# Patient Record
Sex: FEMALE | Race: WHITE | NOT HISPANIC OR LATINO | Employment: OTHER | URBAN - METROPOLITAN AREA
[De-identification: names, ages, dates, MRNs, and addresses within clinical notes are randomized per-mention and may not be internally consistent; named-entity substitution may affect disease eponyms.]

---

## 2018-04-05 ENCOUNTER — RECORDS - HEALTHEAST (OUTPATIENT)
Dept: LAB | Facility: CLINIC | Age: 83
End: 2018-04-05

## 2018-04-05 LAB
ALBUMIN UR-MCNC: NEGATIVE MG/DL
APPEARANCE UR: CLEAR
BACTERIA #/AREA URNS HPF: ABNORMAL HPF
BILIRUB UR QL STRIP: NEGATIVE
COLOR UR AUTO: YELLOW
GLUCOSE UR STRIP-MCNC: NEGATIVE MG/DL
HGB UR QL STRIP: NEGATIVE
HYALINE CASTS #/AREA URNS LPF: ABNORMAL LPF
INR PPP: 2.05 (ref 0.9–1.1)
KETONES UR STRIP-MCNC: NEGATIVE MG/DL
LEUKOCYTE ESTERASE UR QL STRIP: ABNORMAL
MUCOUS THREADS #/AREA URNS LPF: ABNORMAL LPF
NITRATE UR QL: NEGATIVE
PH UR STRIP: 5.5 [PH] (ref 4.5–8)
RBC #/AREA URNS AUTO: ABNORMAL HPF
SP GR UR STRIP: 1.01 (ref 1–1.03)
SQUAMOUS #/AREA URNS AUTO: ABNORMAL LPF
UROBILINOGEN UR STRIP-ACNC: ABNORMAL
WBC #/AREA URNS AUTO: ABNORMAL HPF
WBC CLUMPS #/AREA URNS HPF: PRESENT /[HPF]

## 2018-04-06 ENCOUNTER — RECORDS - HEALTHEAST (OUTPATIENT)
Dept: LAB | Facility: CLINIC | Age: 83
End: 2018-04-06

## 2018-04-07 LAB — BACTERIA SPEC CULT: ABNORMAL

## 2018-04-09 LAB
ANION GAP SERPL CALCULATED.3IONS-SCNC: 8 MMOL/L (ref 5–18)
BUN SERPL-MCNC: 25 MG/DL (ref 8–28)
CALCIUM SERPL-MCNC: 8.7 MG/DL (ref 8.5–10.5)
CHLORIDE BLD-SCNC: 98 MMOL/L (ref 98–107)
CO2 SERPL-SCNC: 28 MMOL/L (ref 22–31)
CREAT SERPL-MCNC: 0.82 MG/DL (ref 0.6–1.1)
ERYTHROCYTE [DISTWIDTH] IN BLOOD BY AUTOMATED COUNT: 14.2 % (ref 11–14.5)
GFR SERPL CREATININE-BSD FRML MDRD: >60 ML/MIN/1.73M2
GLUCOSE BLD-MCNC: 99 MG/DL (ref 70–125)
HCT VFR BLD AUTO: 26.8 % (ref 35–47)
HGB BLD-MCNC: 8.8 G/DL (ref 12–16)
INR PPP: 2.42 (ref 0.9–1.1)
MCH RBC QN AUTO: 29 PG (ref 27–34)
MCHC RBC AUTO-ENTMCNC: 32.8 G/DL (ref 32–36)
MCV RBC AUTO: 88 FL (ref 80–100)
PLATELET # BLD AUTO: 203 THOU/UL (ref 140–440)
PMV BLD AUTO: 9.3 FL (ref 8.5–12.5)
POTASSIUM BLD-SCNC: 3.9 MMOL/L (ref 3.5–5)
RBC # BLD AUTO: 3.03 MILL/UL (ref 3.8–5.4)
SODIUM SERPL-SCNC: 134 MMOL/L (ref 136–145)
WBC: 5.4 THOU/UL (ref 4–11)

## 2018-04-12 ENCOUNTER — RECORDS - HEALTHEAST (OUTPATIENT)
Dept: LAB | Facility: CLINIC | Age: 83
End: 2018-04-12

## 2018-04-13 ENCOUNTER — RECORDS - HEALTHEAST (OUTPATIENT)
Dept: LAB | Facility: CLINIC | Age: 83
End: 2018-04-13

## 2018-04-13 LAB — INR PPP: 3.85 (ref 0.9–1.1)

## 2018-04-16 LAB
HGB BLD-MCNC: 8.9 G/DL (ref 12–16)
INR PPP: 2.21 (ref 0.9–1.1)

## 2018-04-17 ENCOUNTER — RECORDS - HEALTHEAST (OUTPATIENT)
Dept: LAB | Facility: CLINIC | Age: 83
End: 2018-04-17

## 2018-04-17 LAB — INR PPP: 2.09 (ref 0.9–1.1)

## 2018-04-18 ENCOUNTER — RECORDS - HEALTHEAST (OUTPATIENT)
Dept: LAB | Facility: CLINIC | Age: 83
End: 2018-04-18

## 2018-04-19 LAB — INR PPP: 1.5 (ref 0.9–1.1)

## 2018-04-20 ENCOUNTER — RECORDS - HEALTHEAST (OUTPATIENT)
Dept: LAB | Facility: CLINIC | Age: 83
End: 2018-04-20

## 2018-04-23 LAB
HGB BLD-MCNC: 9.6 G/DL (ref 12–16)
INR PPP: 1.38 (ref 0.9–1.1)

## 2018-04-25 ENCOUNTER — RECORDS - HEALTHEAST (OUTPATIENT)
Dept: LAB | Facility: CLINIC | Age: 83
End: 2018-04-25

## 2018-04-26 LAB — INR PPP: 1.64 (ref 0.9–1.1)

## 2018-04-30 ENCOUNTER — RECORDS - HEALTHEAST (OUTPATIENT)
Dept: LAB | Facility: CLINIC | Age: 83
End: 2018-04-30

## 2018-04-30 LAB — INR PPP: 1.71 (ref 0.9–1.1)

## 2018-05-02 ENCOUNTER — RECORDS - HEALTHEAST (OUTPATIENT)
Dept: LAB | Facility: CLINIC | Age: 83
End: 2018-05-02

## 2018-05-02 LAB — INR PPP: 1.85 (ref 0.9–1.1)

## 2018-05-03 ENCOUNTER — RECORDS - HEALTHEAST (OUTPATIENT)
Dept: LAB | Facility: CLINIC | Age: 83
End: 2018-05-03

## 2018-05-04 LAB — INR PPP: 2.12 (ref 0.9–1.1)

## 2018-05-07 ENCOUNTER — RECORDS - HEALTHEAST (OUTPATIENT)
Dept: LAB | Facility: CLINIC | Age: 83
End: 2018-05-07

## 2018-05-07 LAB — INR PPP: 3.55 (ref 0.9–1.1)

## 2018-05-08 ENCOUNTER — RECORDS - HEALTHEAST (OUTPATIENT)
Dept: LAB | Facility: CLINIC | Age: 83
End: 2018-05-08

## 2018-05-08 LAB — INR PPP: 3.08 (ref 0.9–1.1)

## 2018-05-09 ENCOUNTER — RECORDS - HEALTHEAST (OUTPATIENT)
Dept: LAB | Facility: CLINIC | Age: 83
End: 2018-05-09

## 2018-05-09 LAB — INR PPP: 2.26 (ref 0.9–1.1)

## 2020-06-25 ENCOUNTER — HOME CARE/HOSPICE - HEALTHEAST (OUTPATIENT)
Dept: HOME HEALTH SERVICES | Facility: HOME HEALTH | Age: 85
End: 2020-06-25

## 2020-07-17 ENCOUNTER — DOCUMENTATION ONLY (OUTPATIENT)
Dept: OTHER | Facility: CLINIC | Age: 85
End: 2020-07-17

## 2020-07-17 ENCOUNTER — AMBULATORY - HEALTHEAST (OUTPATIENT)
Dept: OTHER | Facility: CLINIC | Age: 85
End: 2020-07-17

## 2021-04-17 ENCOUNTER — COMMUNICATION - HEALTHEAST (OUTPATIENT)
Dept: SCHEDULING | Facility: CLINIC | Age: 86
End: 2021-04-17

## 2021-05-26 ENCOUNTER — RECORDS - HEALTHEAST (OUTPATIENT)
Dept: ADMINISTRATIVE | Facility: CLINIC | Age: 86
End: 2021-05-26

## 2022-04-29 ENCOUNTER — HOSPITAL ENCOUNTER (OUTPATIENT)
Facility: CLINIC | Age: 87
Setting detail: OBSERVATION
Discharge: HOME | End: 2022-04-30
Attending: EMERGENCY MEDICINE | Admitting: FAMILY MEDICINE
Payer: MEDICARE

## 2022-04-29 DIAGNOSIS — T50.905S ADVERSE EFFECT OF DRUG, SEQUELA: ICD-10-CM

## 2022-04-29 DIAGNOSIS — N30.00 ACUTE CYSTITIS WITHOUT HEMATURIA: ICD-10-CM

## 2022-04-29 DIAGNOSIS — R33.9 URINARY RETENTION: Primary | ICD-10-CM

## 2022-04-29 LAB
ALBUMIN SERPL-MCNC: 4.3 G/DL (ref 3.5–5)
ALBUMIN UR-MCNC: NEGATIVE MG/DL
ALP SERPL-CCNC: 77 U/L (ref 45–120)
ALT SERPL W P-5'-P-CCNC: 17 U/L (ref 0–45)
ANION GAP SERPL CALCULATED.3IONS-SCNC: 15 MMOL/L (ref 5–18)
APPEARANCE UR: CLEAR
AST SERPL W P-5'-P-CCNC: 21 U/L (ref 0–40)
BACTERIA #/AREA URNS HPF: ABNORMAL /HPF
BASOPHILS # BLD AUTO: 0 10E3/UL (ref 0–0.2)
BASOPHILS NFR BLD AUTO: 1 %
BILIRUB SERPL-MCNC: 0.5 MG/DL (ref 0–1)
BILIRUB UR QL STRIP: NEGATIVE
BUN SERPL-MCNC: 41 MG/DL (ref 8–28)
CALCIUM SERPL-MCNC: 9.4 MG/DL (ref 8.5–10.5)
CHLORIDE BLD-SCNC: 100 MMOL/L (ref 98–107)
CO2 SERPL-SCNC: 23 MMOL/L (ref 22–31)
COLOR UR AUTO: ABNORMAL
CREAT SERPL-MCNC: 1.38 MG/DL (ref 0.6–1.1)
EOSINOPHIL # BLD AUTO: 0.3 10E3/UL (ref 0–0.7)
EOSINOPHIL NFR BLD AUTO: 5 %
ERYTHROCYTE [DISTWIDTH] IN BLOOD BY AUTOMATED COUNT: 12.8 % (ref 10–15)
GFR SERPL CREATININE-BSD FRML MDRD: 36 ML/MIN/1.73M2
GLUCOSE BLD-MCNC: 104 MG/DL (ref 70–125)
GLUCOSE UR STRIP-MCNC: NEGATIVE MG/DL
HCT VFR BLD AUTO: 39.2 % (ref 35–47)
HGB BLD-MCNC: 12.8 G/DL (ref 11.7–15.7)
HGB UR QL STRIP: NEGATIVE
HOLD SPECIMEN: NORMAL
IMM GRANULOCYTES # BLD: 0 10E3/UL
IMM GRANULOCYTES NFR BLD: 1 %
INR PPP: 2.59 (ref 0.85–1.15)
KETONES UR STRIP-MCNC: NEGATIVE MG/DL
LACTATE SERPL-SCNC: 0.8 MMOL/L (ref 0.7–2)
LEUKOCYTE ESTERASE UR QL STRIP: ABNORMAL
LYMPHOCYTES # BLD AUTO: 1.9 10E3/UL (ref 0.8–5.3)
LYMPHOCYTES NFR BLD AUTO: 29 %
MCH RBC QN AUTO: 28.9 PG (ref 26.5–33)
MCHC RBC AUTO-ENTMCNC: 32.7 G/DL (ref 31.5–36.5)
MCV RBC AUTO: 89 FL (ref 78–100)
MONOCYTES # BLD AUTO: 0.8 10E3/UL (ref 0–1.3)
MONOCYTES NFR BLD AUTO: 13 %
NEUTROPHILS # BLD AUTO: 3.5 10E3/UL (ref 1.6–8.3)
NEUTROPHILS NFR BLD AUTO: 51 %
NITRATE UR QL: NEGATIVE
NRBC # BLD AUTO: 0 10E3/UL
NRBC BLD AUTO-RTO: 0 /100
PH UR STRIP: 6.5 [PH] (ref 5–7)
PLATELET # BLD AUTO: 209 10E3/UL (ref 150–450)
POTASSIUM BLD-SCNC: 4.3 MMOL/L (ref 3.5–5)
PROT SERPL-MCNC: 7.7 G/DL (ref 6–8)
RBC # BLD AUTO: 4.43 10E6/UL (ref 3.8–5.2)
RBC URINE: 1 /HPF
SARS-COV-2 RNA RESP QL NAA+PROBE: NEGATIVE
SODIUM SERPL-SCNC: 138 MMOL/L (ref 136–145)
SP GR UR STRIP: 1.01 (ref 1–1.03)
SQUAMOUS EPITHELIAL: <1 /HPF
UROBILINOGEN UR STRIP-MCNC: <2 MG/DL
WBC # BLD AUTO: 6.6 10E3/UL (ref 4–11)
WBC URINE: 10 /HPF

## 2022-04-29 PROCEDURE — 85610 PROTHROMBIN TIME: CPT | Performed by: INTERNAL MEDICINE

## 2022-04-29 PROCEDURE — 99220 PR INITIAL OBSERVATION CARE,LEVEL III: CPT | Performed by: INTERNAL MEDICINE

## 2022-04-29 PROCEDURE — G0378 HOSPITAL OBSERVATION PER HR: HCPCS

## 2022-04-29 PROCEDURE — 36415 COLL VENOUS BLD VENIPUNCTURE: CPT | Performed by: EMERGENCY MEDICINE

## 2022-04-29 PROCEDURE — 96376 TX/PRO/DX INJ SAME DRUG ADON: CPT

## 2022-04-29 PROCEDURE — 87635 SARS-COV-2 COVID-19 AMP PRB: CPT | Performed by: EMERGENCY MEDICINE

## 2022-04-29 PROCEDURE — 250N000013 HC RX MED GY IP 250 OP 250 PS 637: Performed by: INTERNAL MEDICINE

## 2022-04-29 PROCEDURE — C9803 HOPD COVID-19 SPEC COLLECT: HCPCS

## 2022-04-29 PROCEDURE — 83605 ASSAY OF LACTIC ACID: CPT | Performed by: EMERGENCY MEDICINE

## 2022-04-29 PROCEDURE — 99285 EMERGENCY DEPT VISIT HI MDM: CPT | Mod: 25

## 2022-04-29 PROCEDURE — 96365 THER/PROPH/DIAG IV INF INIT: CPT

## 2022-04-29 PROCEDURE — 250N000011 HC RX IP 250 OP 636: Performed by: EMERGENCY MEDICINE

## 2022-04-29 PROCEDURE — 258N000003 HC RX IP 258 OP 636: Performed by: EMERGENCY MEDICINE

## 2022-04-29 PROCEDURE — 85025 COMPLETE CBC W/AUTO DIFF WBC: CPT | Performed by: EMERGENCY MEDICINE

## 2022-04-29 PROCEDURE — 80053 COMPREHEN METABOLIC PANEL: CPT | Performed by: EMERGENCY MEDICINE

## 2022-04-29 PROCEDURE — 87040 BLOOD CULTURE FOR BACTERIA: CPT | Performed by: EMERGENCY MEDICINE

## 2022-04-29 PROCEDURE — 96361 HYDRATE IV INFUSION ADD-ON: CPT

## 2022-04-29 PROCEDURE — 81001 URINALYSIS AUTO W/SCOPE: CPT | Performed by: EMERGENCY MEDICINE

## 2022-04-29 RX ORDER — MEROPENEM 1 G/1
1 INJECTION, POWDER, FOR SOLUTION INTRAVENOUS ONCE
Status: DISCONTINUED | OUTPATIENT
Start: 2022-04-29 | End: 2022-04-29

## 2022-04-29 RX ORDER — METOPROLOL TARTRATE 25 MG/1
75 TABLET, FILM COATED ORAL 2 TIMES DAILY
Status: DISCONTINUED | OUTPATIENT
Start: 2022-04-29 | End: 2022-04-30 | Stop reason: HOSPADM

## 2022-04-29 RX ORDER — ONDANSETRON 4 MG/1
4 TABLET, ORALLY DISINTEGRATING ORAL EVERY 6 HOURS PRN
Status: DISCONTINUED | OUTPATIENT
Start: 2022-04-29 | End: 2022-04-30 | Stop reason: HOSPADM

## 2022-04-29 RX ORDER — SODIUM CHLORIDE 9 MG/ML
INJECTION, SOLUTION INTRAVENOUS CONTINUOUS
Status: DISCONTINUED | OUTPATIENT
Start: 2022-04-29 | End: 2022-04-29

## 2022-04-29 RX ORDER — LOSARTAN POTASSIUM 25 MG/1
100 TABLET ORAL DAILY
Status: DISCONTINUED | OUTPATIENT
Start: 2022-04-30 | End: 2022-04-30 | Stop reason: HOSPADM

## 2022-04-29 RX ORDER — HYDRALAZINE HYDROCHLORIDE 25 MG/1
25 TABLET, FILM COATED ORAL 2 TIMES DAILY
Status: DISCONTINUED | OUTPATIENT
Start: 2022-04-29 | End: 2022-04-30 | Stop reason: HOSPADM

## 2022-04-29 RX ORDER — AMOXICILLIN 250 MG
1 CAPSULE ORAL AT BEDTIME
COMMUNITY

## 2022-04-29 RX ORDER — MEROPENEM 1 G/1
1 INJECTION, POWDER, FOR SOLUTION INTRAVENOUS EVERY 12 HOURS
Status: DISCONTINUED | OUTPATIENT
Start: 2022-04-29 | End: 2022-04-29

## 2022-04-29 RX ORDER — DILTIAZEM HYDROCHLORIDE 180 MG/1
180 CAPSULE, EXTENDED RELEASE ORAL DAILY
Status: DISCONTINUED | OUTPATIENT
Start: 2022-04-30 | End: 2022-04-30 | Stop reason: HOSPADM

## 2022-04-29 RX ORDER — DIPHENHYDRAMINE HCL 25 MG
12.5 TABLET ORAL AT BEDTIME
Status: ON HOLD | COMMUNITY
End: 2022-04-30

## 2022-04-29 RX ORDER — LOSARTAN POTASSIUM 100 MG/1
100 TABLET ORAL DAILY
COMMUNITY
End: 2022-09-22

## 2022-04-29 RX ORDER — ESTRADIOL 0.1 MG/G
1 CREAM VAGINAL PRN
COMMUNITY

## 2022-04-29 RX ORDER — ONDANSETRON 2 MG/ML
4 INJECTION INTRAMUSCULAR; INTRAVENOUS EVERY 6 HOURS PRN
Status: DISCONTINUED | OUTPATIENT
Start: 2022-04-29 | End: 2022-04-30 | Stop reason: HOSPADM

## 2022-04-29 RX ORDER — MULTIVIT WITH MINERALS/LUTEIN
1000 TABLET ORAL 2 TIMES DAILY
COMMUNITY

## 2022-04-29 RX ORDER — ATORVASTATIN CALCIUM 20 MG/1
20 TABLET, FILM COATED ORAL
COMMUNITY

## 2022-04-29 RX ORDER — ACETAMINOPHEN 500 MG
1000 TABLET ORAL AT BEDTIME
Status: DISCONTINUED | OUTPATIENT
Start: 2022-04-29 | End: 2022-04-30 | Stop reason: HOSPADM

## 2022-04-29 RX ORDER — FAMOTIDINE 20 MG/1
20 TABLET, FILM COATED ORAL 2 TIMES DAILY
Status: ON HOLD | COMMUNITY
End: 2024-08-27

## 2022-04-29 RX ORDER — BUMETANIDE 1 MG/1
1 TABLET ORAL AT BEDTIME
Status: DISCONTINUED | OUTPATIENT
Start: 2022-04-29 | End: 2022-04-30 | Stop reason: HOSPADM

## 2022-04-29 RX ORDER — DIPHENHYDRAMINE HCL 25 MG
12.5 TABLET ORAL AT BEDTIME
Status: DISCONTINUED | OUTPATIENT
Start: 2022-04-29 | End: 2022-04-30 | Stop reason: HOSPADM

## 2022-04-29 RX ORDER — BUMETANIDE 1 MG/1
2 TABLET ORAL
Status: DISCONTINUED | OUTPATIENT
Start: 2022-04-30 | End: 2022-04-30 | Stop reason: HOSPADM

## 2022-04-29 RX ORDER — MEROPENEM 500 MG/1
500 INJECTION, POWDER, FOR SOLUTION INTRAVENOUS EVERY 12 HOURS
Status: DISCONTINUED | OUTPATIENT
Start: 2022-04-30 | End: 2022-04-30 | Stop reason: HOSPADM

## 2022-04-29 RX ORDER — DILTIAZEM HYDROCHLORIDE 180 MG/1
180 CAPSULE, EXTENDED RELEASE ORAL EVERY EVENING
COMMUNITY

## 2022-04-29 RX ORDER — ACETAMINOPHEN 500 MG
500 TABLET ORAL AT BEDTIME
COMMUNITY

## 2022-04-29 RX ORDER — TAMSULOSIN HYDROCHLORIDE 0.4 MG/1
0.4 CAPSULE ORAL AT BEDTIME
Status: DISCONTINUED | OUTPATIENT
Start: 2022-04-29 | End: 2022-04-30 | Stop reason: HOSPADM

## 2022-04-29 RX ORDER — HYDRALAZINE HYDROCHLORIDE 25 MG/1
25 TABLET, FILM COATED ORAL 2 TIMES DAILY
Status: ON HOLD | COMMUNITY
End: 2022-09-22

## 2022-04-29 RX ORDER — AMOXICILLIN 250 MG
1 CAPSULE ORAL AT BEDTIME
Status: DISCONTINUED | OUTPATIENT
Start: 2022-04-29 | End: 2022-04-30 | Stop reason: HOSPADM

## 2022-04-29 RX ORDER — BUMETANIDE 1 MG/1
2 TABLET ORAL
Status: ON HOLD | COMMUNITY
End: 2024-08-27

## 2022-04-29 RX ORDER — BUMETANIDE 1 MG/1
1 TABLET ORAL AT BEDTIME
Status: ON HOLD | COMMUNITY
End: 2024-08-27

## 2022-04-29 RX ORDER — CEFDINIR 300 MG/1
300 CAPSULE ORAL DAILY
Status: ON HOLD | COMMUNITY
Start: 2022-04-27 | End: 2022-04-30

## 2022-04-29 RX ORDER — MEROPENEM 1 G/1
1 INJECTION, POWDER, FOR SOLUTION INTRAVENOUS ONCE
Status: COMPLETED | OUTPATIENT
Start: 2022-04-29 | End: 2022-04-29

## 2022-04-29 RX ORDER — METOPROLOL TARTRATE 50 MG
75 TABLET ORAL 2 TIMES DAILY
Status: ON HOLD | COMMUNITY
End: 2024-08-27

## 2022-04-29 RX ORDER — FAMOTIDINE 20 MG/1
20 TABLET, FILM COATED ORAL
Status: DISCONTINUED | OUTPATIENT
Start: 2022-05-01 | End: 2022-04-30 | Stop reason: HOSPADM

## 2022-04-29 RX ORDER — BETAMETHASONE DIPROPIONATE 0.5 MG/G
CREAM TOPICAL 2 TIMES DAILY PRN
COMMUNITY

## 2022-04-29 RX ORDER — IBUPROFEN 200 MG
1 CAPSULE ORAL 2 TIMES DAILY
COMMUNITY

## 2022-04-29 RX ADMIN — MEROPENEM 1 G: 1 INJECTION, POWDER, FOR SOLUTION INTRAVENOUS at 19:12

## 2022-04-29 RX ADMIN — MEROPENEM 1 G: 1 INJECTION, POWDER, FOR SOLUTION INTRAVENOUS at 19:42

## 2022-04-29 RX ADMIN — SODIUM CHLORIDE 1000 ML: 9 INJECTION, SOLUTION INTRAVENOUS at 18:41

## 2022-04-29 RX ADMIN — WARFARIN SODIUM 3.75 MG: 2.5 TABLET ORAL at 21:43

## 2022-04-29 RX ADMIN — DIPHENHYDRAMINE HCL 12.5 MG: 25 TABLET ORAL at 21:43

## 2022-04-29 RX ADMIN — SENNOSIDES AND DOCUSATE SODIUM 1 TABLET: 50; 8.6 TABLET ORAL at 21:40

## 2022-04-29 RX ADMIN — ACETAMINOPHEN 1000 MG: 500 TABLET, FILM COATED ORAL at 21:40

## 2022-04-29 NOTE — ED PROVIDER NOTES
EMERGENCY DEPARTMENT ENCOUNTER      NAME: Alva Dumont  AGE: 89 year old female  YOB: 1929  MRN: [id number]  EVALUATION DATE & TIME: 4/29/2022  5:30 PM    PCP: Martin Stauffer    ED PROVIDER: Gage Parkinson M.D.      Chief Complaint   Patient presents with     UTI         FINAL IMPRESSION:  1. Acute cystitis without hematuria          ED COURSE & MEDICAL DECISION MAKING:    Pertinent Labs & Imaging studies reviewed. (See chart for details)  89 year old female presents to the Emergency Department for evaluation of low back pain, headache, fatigue. Patient appears non toxic with stable vitals signs, patient is afebrile with no tachycardia or hypoxia, no increased work of breathing.  Lungs are clear, abdomen is benign and I cannot reproduce significant tenderness to deep palpation on abdominal exam, she has no flank pain or CVA tenderness.  Review the medical record shows patient had urine culture from 4/27/2022 results showing Morganella with multiple drug resistance.  That in conjunction with the patient's several antibiotic allergies, spoke with pharmacy and at this time recommendations is for treatment with meropenem.  No flank pain, no fevers she appears comfortable, certainly nothing to suggest pyelonephritis, we did obtain screening labs which showed no elevated white blood cell count or lactic acid with nothing to suggest endorgan dysfunction or sepsis.  We will send for blood cultures and initiate meropenem.  Patient will be admitted for continued treatment and observation.  Discussed these findings recommendations with patient and family and they are in agreement.  Discussed care plan with the admitting service.    6:15 PM I met with the patient, obtained history, performed an initial exam, and discussed options and plan for diagnostics and treatment here in the ED. PPE worn: N95 mask, gloves, eye protection  6:53 PM Pharmacy recommends patient take Meropenem.   7:03 PM Discussed pharmacy  recommendations and room for admission.   7:13 PM I discussed the case with hospitalist, Dr Rivas. She offered an alternative oral course for the patient.   7:33 PM I rechecked and updated the patient.  7:38 PM Patient states that she has used fosfomycin in the past with no improvement.  Discussed this update with Dr. Rivas who accepts the patient for admission.    Reassessment: Labs showed no acute concerning findings, did note creatinine of 1.38.  Otherwise no elevated white blood cell count or elevated lactic acid with certainly nothing to suggest sepsis.  Again urine is suggestive of infection and we do have recent positive urine cultures.  Plan was to initiate meropenem, discussed these findings and need for admission with patient and family and in agreement.  Discussed care plan with Dr. Rivas who initially offered alternative course, fosfomycin and discharge close follow-up.  Again patient looked very well at the bedside and she was eager to return home.  I discussed this alternative option with patient and family but they state that they have used this medication, fosfomycin, in the past with no improvement, therefore plan will be for admission for IV meropenem and close observation.  Discussed these findings recommendations with the patient who is in agreement.  Discussed care plan with admitting service.    At the conclusion of the encounter I discussed the results of all of the tests and the disposition. The questions were answered and return precautions provided. The patient or family acknowledged understanding and was agreeable with the care plan.         MEDICATIONS GIVEN IN THE EMERGENCY:  Medications   melatonin tablet 1 mg (has no administration in time range)   ondansetron (ZOFRAN-ODT) ODT tab 4 mg (has no administration in time range)     Or   ondansetron (ZOFRAN) injection 4 mg (has no administration in time range)   meropenem (MERREM) 500 mg vial to attach to  mL bag for ADULTS or 25  "mL bag for PEDS (has no administration in time range)   Warfarin Therapy Reminder (Check START DATE - warfarin may be starting in the FUTURE) (has no administration in time range)   bumetanide (BUMEX) tablet 2 mg (has no administration in time range)   bumetanide (BUMEX) tablet 1 mg (1 mg Oral Not Given 4/29/22 2137)   acetaminophen (TYLENOL) tablet 1,000 mg (1,000 mg Oral Given 4/29/22 2140)   diltiazem ER (DILT-XR) 24 hr capsule 180 mg (has no administration in time range)   diphenhydrAMINE (BENADRYL) half-tab 12.5 mg (12.5 mg Oral Given 4/29/22 2143)   famotidine (PEPCID) tablet 20 mg (has no administration in time range)   hydrALAZINE (APRESOLINE) tablet 25 mg (25 mg Oral Not Given 4/29/22 2137)   losartan (COZAAR) tablet 100 mg (has no administration in time range)   metoprolol tartrate (LOPRESSOR) tablet 75 mg (75 mg Oral Not Given 4/29/22 2138)   senna-docusate (SENOKOT-S/PERICOLACE) 8.6-50 MG per tablet 1 tablet (1 tablet Oral Given 4/29/22 2140)   tamsulosin (FLOMAX) capsule 0.4 mg (0.4 mg Oral Not Given 4/29/22 2138)   0.9% sodium chloride BOLUS (0 mLs Intravenous Stopped 4/29/22 1941)   meropenem (MERREM) 1 g vial to attach to  mL bag (0 g Intravenous Stopped 4/29/22 2015)   warfarin ANTICOAGULANT (COUMADIN) half-tab 3.75 mg (3.75 mg Oral Given 4/29/22 2143)       NEW PRESCRIPTIONS STARTED AT TODAY'S ER VISIT  Current Discharge Medication List               =================================================================    HPI    Patient information was obtained from: Patient     Use of Intrepreter: N/A         Alva Dumont is a 89 year old female who presents UTI.     Per chart review the patient was seen at [facility] on 4/27/22 (2 days ago) for urinary problem. Patient reported lower back pain, as well as \"this is how it starts when I have a bladder infection.\" Urine culture pending. Discharged with higher dosage of Keflex.     The patient reports that Urgent Care called her " "today about her urine culture results and they prompted her to be present in the ED. When the patient went to Urgent Care 2 days ago, she states that she had symptoms of back pain, a headache, and fatigue, which is \"usually\" how her UTI's start. The patient also notes that she has been admitted to the hospital before with treatment of IV antibiotics for her UTI's in the past. Currently the patient feels tired but has no new symptoms. She denies bloody stools and hematuria. She denies any medication changes. She does note having a history of atrial fibrillation. No chest pain, palpitations, shortness of breath, vomiting, appetite changes, or fevers. No other symptoms or complaints at this time.     Social Hx: Non-Smoker. Non-Alcohol User.     REVIEW OF SYSTEMS   Constitutional: Positive for fatigue. Denies fever, chills, appetite changes  Respiratory:  Denies productive cough or increased work of breathing  Cardiovascular:  Denies chest pain, palpitations  GI: Denies abdominal pain, nausea, vomiting, or change in bowel or bladder habits   Musculoskeletal: Positive for back pain. Denies any new muscle/joint swelling  Skin:  Denies rash   Neurologic: Positive for headache. Denies focal weakness  All systems negative except as marked.     PAST MEDICAL HISTORY:  Past Medical History:   Diagnosis Date     Adjustment reaction with anxiety and depression      Atrial fibrillation with RVR (H)      Cervical spondylosis with radiculopathy      CHF (congestive heart failure) (H)      Chronic back pain      Chronic kidney disease, stage 3 (H)      CVA (cerebral infarction)     Incidental old small infarcts seen on MRI 7/2004     Frequent UTI 7/31/12    had pseudomonas UTI and required caftazidime IV; cystitis cystica on cystoscopy     HTN (hypertension)      Hypertrophic obstructive cardiomyopathy (H)      Hypochloremia      Hypokalemia      Hyponatremia      Hyponatremia      Insomnia      Left wrist fracture 2002     Lumbar " spinal stenosis      Macular degeneration      Occlusion and stenosis of carotid artery without mention of cerebral infarction 6/11/2008    Right carotid on US-moderate     Osteoporosis      Pure hypercholesterolemia      Sciatica      Senile cataract        PAST SURGICAL HISTORY:  Past Surgical History:   Procedure Laterality Date     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 Authorizing Provider   acetaminophen (TYLENOL) 325 MG tablet Take 1-2 tablets by mouth every 6 hours as needed.    Reported, Patient   atenolol (TENORMIN) 50 MG tablet Take 50 mg by mouth 2 times daily.    Reported, Patient   cefUROXime (CEFTIN) 500 MG tablet Take 1 tablet by mouth 2 times daily. 10/2/12   Kenya Montero PA-C   Cholecalciferol (VITAMIN D PO) Take  by mouth.    Reported, Patient   desoximetasone (TOPICORT) 0.25 % OINT Apply  topically 2 times daily. 1/25/13   Binh Feliciano MD   Diltiazem HCl 240 MG CP24 Take 240 mg by mouth daily.    Reported, Patient   Docusate Calcium (STOOL SOFTENER PO) Take  by mouth.    Reported, Patient   DoxePIN (ZONALON) 5 % cream Apply  topically 3 times daily. 1/23/12   Binh Feliciano MD   fluocinonide (LIDEX) 0.05 % external solution Apply topically 2 times daily 12/9/13   Anabela Paz MD   furosemide (LASIX) 40 MG tablet Take 40 mg by mouth daily.    Reported, Patient   hydrOXYzine (ATARAX) 25 MG tablet Take 1 tablet by mouth 3 times daily as needed. 8/9/12   Binh Feliciano MD   ketoconazole (NIZORAL) 2 % shampoo As shampoo 8/9/12   Binh Feliciano MD   methenamine hippurate (HIPREX) 1 G TABS Take 1 tablet (1 g) by mouth 2 times daily 10/15/13   Kenya Montero PA-C   methenamine hippurate (HIPREX) 1 G TABS Take 1 tablet by  "mouth 2 times daily. 1/17/13   Kenya Montero PA-C   nitrofurantoin, macrocrystal-monohydrate, (MACROBID) 100 MG capsule Take 1 capsule (100 mg) by mouth 2 times daily 9/3/13   Kenya Montero PA-C   nitrofurantoin, macrocrystal-monohydrate, (MACROBID) 100 MG capsule Take 1 capsule by mouth 2 times daily. 12/6/12   Kenya Montero PA-C   Pramoxine HCl (VAGISIL ANTI-ITCH MEDICATED) 1 % MISC Apply a pea sized amount to affected area twice daily as needed. 15gram tube. 2/12/13   Kenya Montero PA-C   valsartan (DIOVAN) 160 MG tablet Take 160 mg by mouth daily.    Reported, Patient   warfarin (COUMADIN) 10 MG tablet Take 4 tablets by mouth daily.    Reported, Patient        ALLERGIES:  Allergies   Allergen Reactions     Cefprozil Shortness Of Breath     Penicillins Nausea and Rash     Has had different cephalosporins in past , tolerates Augmentin ok if benadryl is given together (last given 12/2018)  Tolerated Zosyn 6/2019.   Has had different cephalosporins in past       Ciprofloxacin Hives     Clonidine Other (See Comments)     Extreme Fatigue  Extreme Fatigue       Codeine Other (See Comments)     \"felt wired\"    \"felt wired\"       Levofloxacin Other (See Comments)     agitation       Amoxicillin Hives     Aspirin Unknown     Cefaclor      Ciprofloxacin Hives     Hydrochlorothiazide Nausea     Oxycodone Itching     Spironolactone Other (See Comments)     Patient can't remember what happens     Sulfa Drugs Hives     Zestril [Lisinopril] Other (See Comments)     Patient can not remember what happens       FAMILY HISTORY:  Family History   Problem Relation Age of Onset     Breast Cancer Mother      Hypertension Sister      Alcoholism Brother         and Drug     Cancer Son         pancreatic     Aneurysm Brother      Other - See Comments Brother         renal failure       SOCIAL HISTORY:   Social History     Socioeconomic History     Marital status:  " "  Tobacco Use     Smoking status: Never Smoker     Smokeless tobacco: Never Used   Substance and Sexual Activity     Alcohol use: No     Drug use: No       VITALS:  Patient Vitals for the past 24 hrs:   BP Temp Temp src Pulse Resp SpO2 Height Weight   04/29/22 2138 (!) 157/71 -- -- -- -- -- -- --   04/29/22 2041 (!) 176/88 98  F (36.7  C) Oral -- 20 95 % 1.448 m (4' 9\") 55.8 kg (123 lb 1.6 oz)   04/29/22 2015 (!) 163/82 -- -- 93 -- 95 % 1.448 m (4' 9.01\") --   04/29/22 2000 (!) 169/81 -- -- 92 -- 95 % -- --   04/29/22 1945 (!) 177/77 -- -- 92 -- 96 % -- --   04/29/22 1930 (!) 169/79 -- -- 88 -- 96 % -- --   04/29/22 1915 (!) 178/88 -- -- 94 -- 96 % -- --   04/29/22 1900 (!) 171/88 -- -- 88 -- 96 % -- --   04/29/22 1632 (!) 153/90 98.5  F (36.9  C) Temporal 85 18 95 % -- 54.4 kg (120 lb)        PHYSICAL EXAM    Constitutional:  Awake, alert, in no apparent distress  HENT:  Normocephalic, Atraumatic. Bilateral external ears normal. Oropharynx moist. Nose normal. Neck- Normal range of motion with no guarding, No midline cervical tenderness, Supple, No stridor.   Eyes:  PERRL, EOMI with no signs of entrapment, Conjunctiva normal, No discharge.   Respiratory:  Normal breath sounds, No respiratory distress, No wheezing.    Cardiovascular:  Normal heart rate, irregular rhythm, No appreciable rubs or gallops.   GI:  Soft, No tenderness, No distension, No palpable masses  GI: No CVA tenderness.  Musculoskeletal:  Intact distal pulses, No edema. Good range of motion in all major joints. No tenderness to palpation or major deformities noted.  Integument:  Warm, Dry, No erythema, No rash.   Neurologic:  Alert & oriented, Normal motor function, Normal sensory function, No focal deficits noted.   Psychiatric:  Affect normal, Judgment normal, Mood normal.     LAB:  All pertinent labs reviewed and interpreted.  Results for orders placed or performed during the hospital encounter of 04/29/22   Extra Blue Top Tube   Result Value Ref " Range    Hold Specimen JIC    Extra Green Top (Lithium Heparin) Tube   Result Value Ref Range    Hold Specimen JIC    Extra Purple Top Tube   Result Value Ref Range    Hold Specimen JI    Lactic acid whole blood   Result Value Ref Range    Lactic Acid 0.8 0.7 - 2.0 mmol/L   Comprehensive metabolic panel   Result Value Ref Range    Sodium 138 136 - 145 mmol/L    Potassium 4.3 3.5 - 5.0 mmol/L    Chloride 100 98 - 107 mmol/L    Carbon Dioxide (CO2) 23 22 - 31 mmol/L    Anion Gap 15 5 - 18 mmol/L    Urea Nitrogen 41 (H) 8 - 28 mg/dL    Creatinine 1.38 (H) 0.60 - 1.10 mg/dL    Calcium 9.4 8.5 - 10.5 mg/dL    Glucose 104 70 - 125 mg/dL    Alkaline Phosphatase 77 45 - 120 U/L    AST 21 0 - 40 U/L    ALT 17 0 - 45 U/L    Protein Total 7.7 6.0 - 8.0 g/dL    Albumin 4.3 3.5 - 5.0 g/dL    Bilirubin Total 0.5 0.0 - 1.0 mg/dL    GFR Estimate 36 (L) >60 mL/min/1.73m2   UA with Microscopic reflex to Culture    Specimen: Urine, Clean Catch   Result Value Ref Range    Color Urine Light Yellow Colorless, Straw, Light Yellow, Yellow    Appearance Urine Clear Clear    Glucose Urine Negative Negative mg/dL    Bilirubin Urine Negative Negative    Ketones Urine Negative Negative mg/dL    Specific Gravity Urine 1.012 1.001 - 1.030    Blood Urine Negative Negative    pH Urine 6.5 5.0 - 7.0    Protein Albumin Urine Negative Negative mg/dL    Urobilinogen Urine <2.0 <2.0 mg/dL    Nitrite Urine Negative Negative    Leukocyte Esterase Urine 75 Martin/uL (A) Negative    Bacteria Urine Few (A) None Seen /HPF    RBC Urine 1 <=2 /HPF    WBC Urine 10 (H) <=5 /HPF    Squamous Epithelials Urine <1 <=1 /HPF   CBC with platelets and differential   Result Value Ref Range    WBC Count 6.6 4.0 - 11.0 10e3/uL    RBC Count 4.43 3.80 - 5.20 10e6/uL    Hemoglobin 12.8 11.7 - 15.7 g/dL    Hematocrit 39.2 35.0 - 47.0 %    MCV 89 78 - 100 fL    MCH 28.9 26.5 - 33.0 pg    MCHC 32.7 31.5 - 36.5 g/dL    RDW 12.8 10.0 - 15.0 %    Platelet Count 209 150 - 450 10e3/uL     % Neutrophils 51 %    % Lymphocytes 29 %    % Monocytes 13 %    % Eosinophils 5 %    % Basophils 1 %    % Immature Granulocytes 1 %    NRBCs per 100 WBC 0 <1 /100    Absolute Neutrophils 3.5 1.6 - 8.3 10e3/uL    Absolute Lymphocytes 1.9 0.8 - 5.3 10e3/uL    Absolute Monocytes 0.8 0.0 - 1.3 10e3/uL    Absolute Eosinophils 0.3 0.0 - 0.7 10e3/uL    Absolute Basophils 0.0 0.0 - 0.2 10e3/uL    Absolute Immature Granulocytes 0.0 <=0.4 10e3/uL    Absolute NRBCs 0.0 10e3/uL   Asymptomatic COVID-19 Virus (Coronavirus) by PCR Nasopharyngeal    Specimen: Nasopharyngeal; Swab   Result Value Ref Range    SARS CoV2 PCR Negative Negative   Result Value Ref Range    INR 2.59 (H) 0.85 - 1.15       RADIOLOGY:  No orders to display          EKG:      I have independently reviewed and interpreted the EKG(s) documented above.    PROCEDURES:        I, Boris Aguirre, am serving as a scribe to document services personally performed by Gage Parkinson MD, based on my observation and the provider's statements to me. I, Gage Parkinson MD attest that Boris Aguirre is acting in a scribe capacity, has observed my performance of the services and has documented them in accordance with my direction.    Gage Parkinson M.D.  Emergency Medicine  [facility] EMERGENCY ROOM  [address] [id number] [phone]  Dept: [phone]     Gage Parkinson MD  04/29/22 2650

## 2022-04-29 NOTE — ED TRIAGE NOTES
pt presents to the ED with c/o worsening UTI. Pt was called by her PMD and told to go to the ED for IV antibiotics r/t results from urine culture.

## 2022-04-30 VITALS
DIASTOLIC BLOOD PRESSURE: 70 MMHG | HEIGHT: 57 IN | WEIGHT: 121.44 LBS | TEMPERATURE: 98.2 F | HEART RATE: 83 BPM | SYSTOLIC BLOOD PRESSURE: 146 MMHG | BODY MASS INDEX: 26.2 KG/M2 | OXYGEN SATURATION: 94 % | RESPIRATION RATE: 16 BRPM

## 2022-04-30 PROBLEM — R33.9 URINARY RETENTION: Status: ACTIVE | Noted: 2022-04-30

## 2022-04-30 PROBLEM — T50.905A ADVERSE EFFECT OF DRUG OR MEDICAMENT: Status: ACTIVE | Noted: 2022-04-30

## 2022-04-30 LAB
ALBUMIN SERPL-MCNC: 3.5 G/DL (ref 3.5–5)
ALP SERPL-CCNC: 69 U/L (ref 45–120)
ALT SERPL W P-5'-P-CCNC: 14 U/L (ref 0–45)
ANION GAP SERPL CALCULATED.3IONS-SCNC: 10 MMOL/L (ref 5–18)
AST SERPL W P-5'-P-CCNC: 16 U/L (ref 0–40)
BILIRUB SERPL-MCNC: 0.7 MG/DL (ref 0–1)
BUN SERPL-MCNC: 31 MG/DL (ref 8–28)
CALCIUM SERPL-MCNC: 8.6 MG/DL (ref 8.5–10.5)
CHLORIDE BLD-SCNC: 106 MMOL/L (ref 98–107)
CO2 SERPL-SCNC: 24 MMOL/L (ref 22–31)
CREAT SERPL-MCNC: 0.95 MG/DL (ref 0.6–1.1)
GFR SERPL CREATININE-BSD FRML MDRD: 56 ML/MIN/1.73M2
GLUCOSE BLD-MCNC: 99 MG/DL (ref 70–125)
INR PPP: 2.54 (ref 0.85–1.15)
POTASSIUM BLD-SCNC: 3.9 MMOL/L (ref 3.5–5)
PROT SERPL-MCNC: 6.2 G/DL (ref 6–8)
SODIUM SERPL-SCNC: 140 MMOL/L (ref 136–145)

## 2022-04-30 PROCEDURE — 96376 TX/PRO/DX INJ SAME DRUG ADON: CPT

## 2022-04-30 PROCEDURE — 250N000011 HC RX IP 250 OP 636: Performed by: INTERNAL MEDICINE

## 2022-04-30 PROCEDURE — 99217 PR OBSERVATION CARE DISCHARGE: CPT | Performed by: FAMILY MEDICINE

## 2022-04-30 PROCEDURE — 85610 PROTHROMBIN TIME: CPT | Performed by: INTERNAL MEDICINE

## 2022-04-30 PROCEDURE — 36415 COLL VENOUS BLD VENIPUNCTURE: CPT | Performed by: INTERNAL MEDICINE

## 2022-04-30 PROCEDURE — 250N000013 HC RX MED GY IP 250 OP 250 PS 637: Performed by: INTERNAL MEDICINE

## 2022-04-30 PROCEDURE — G0378 HOSPITAL OBSERVATION PER HR: HCPCS

## 2022-04-30 PROCEDURE — 80053 COMPREHEN METABOLIC PANEL: CPT | Performed by: INTERNAL MEDICINE

## 2022-04-30 RX ORDER — LEVOFLOXACIN 250 MG/1
250 TABLET, FILM COATED ORAL DAILY
Qty: 6 TABLET | Refills: 0 | Status: SHIPPED | OUTPATIENT
Start: 2022-04-30 | End: 2022-05-06

## 2022-04-30 RX ORDER — TAMSULOSIN HYDROCHLORIDE 0.4 MG/1
0.4 CAPSULE ORAL AT BEDTIME
Qty: 30 CAPSULE | Refills: 0 | Status: SHIPPED | OUTPATIENT
Start: 2022-04-30

## 2022-04-30 RX ORDER — DIPHENHYDRAMINE HCL 25 MG
12.5 TABLET ORAL EVERY 6 HOURS PRN
Start: 2022-04-30

## 2022-04-30 RX ORDER — LEVOFLOXACIN 500 MG/1
500 TABLET, FILM COATED ORAL DAILY
Qty: 6 TABLET | Refills: 0 | Status: SHIPPED | OUTPATIENT
Start: 2022-04-30 | End: 2022-04-30

## 2022-04-30 RX ADMIN — MEROPENEM 500 MG: 500 INJECTION, POWDER, FOR SOLUTION INTRAVENOUS at 09:16

## 2022-04-30 RX ADMIN — METOPROLOL TARTRATE 75 MG: 25 TABLET, FILM COATED ORAL at 09:11

## 2022-04-30 RX ADMIN — HYDRALAZINE HYDROCHLORIDE 25 MG: 25 TABLET, FILM COATED ORAL at 09:12

## 2022-04-30 RX ADMIN — BUMETANIDE 2 MG: 1 TABLET ORAL at 06:35

## 2022-04-30 RX ADMIN — DILTIAZEM HYDROCHLORIDE 180 MG: 180 CAPSULE, EXTENDED RELEASE ORAL at 09:11

## 2022-04-30 RX ADMIN — LOSARTAN POTASSIUM 100 MG: 25 TABLET, FILM COATED ORAL at 09:11

## 2022-04-30 NOTE — H&P
[facility] MEDICINE ADMISSION HISTORY AND PHYSICAL     Assessment & Plan       Alva Dumont is a 89 year old old female with history of chronic diastolic CHF, chronic atrial fibrillation, on chronic anticoagulation treatment, hypertrophic obstructive cardiomyopathy, essential hypertension, CKD 3, CVA without residual weakness, recurrent urinary tract infections, DNR/DNI status, osteoporosis, neurogenic bladder with incomplete emptying, multiple antibiotic allergies, gait instability,  presented with concerns of multidrug-resistant Morganella urinary tract infection.  1/.  Urinary tract infection: Recurrent issue specially given the fact that the patient has a neurogenic bladder and has significant amount of postvoid residue.  I had offered her fosfomycin for 3 days and for some unclear reason she told me that she was not going to take it because she had difficulty swallowing the powder!  She has received 1 dose of meropenem in the emergency room without any side effects even though that is when listed as an allergy for her  We will give her another dose of meropenem in the morning  Patient can be safely discharged home with levofloxacin tomorrow morning which she can take with 12.5 mg of Benadryl.  Total duration of antibiotic therapy should be no more than 7 days  She has agreed to that and in fact she has told me that she cannot tolerate levofloxacin along with Benadryl and she experiences no side effects.  2/.  Atrial fibrillation with chronic anticoagulation currently adequately rate controlled continue with home medications INR is therapeutic, pharmacy to monitor and manage.  3/.  Hypertension: On multiple medications which can be resumed safely.  4/.  CODE STATUS DNR/DNI confirmed with the patient  5/.  Urinary retention with postvoid residue it may not be unreasonable to give her a trial of Flomax which she is not allergic to which will be started beginning tonight so that when she  follows up with urology in a month or so she would know if that has worked for her or not    Patient was seen and evaluated in the emergency room in the presence of patient's daughter        DVTP: Warfarin   Code Status: No CPR- Do NOT Intubate  Disposition: Observation   Expected LOS: 1 day  Goals for the hospitalization: Couple of doses of antibiotics and discharged with oral antibiotics  Disposition Plan   Expected Discharge: April 30  Anticipated discharge location:  Home  Delays:    2 more doses of IV antibiotics       The patient's care was discussed with the Bedside Nurse, Patient and Patient's Family.  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  According to the patient she has been dealing with urinary tract infections for most of her adult life.  She also tells me that she has seen a urologist in the past and is scheduled to see another 1 moving forwards.  She tells me she has a longstanding history of urinary retention and possible ureteral reflux (as she understands) and hence has had recurrent UTIs, previous urinary tract infection was in October 2021 and she was started on cefdinir which seem to have address the issue.  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  No date: HTN (hypertension)  No date: Hypertrophic obstructive cardiomyopathy (H)  No date: Hypochloremia  No date: Hypokalemia  No date: Hyponatremia  No date: Hyponatremia  No date: Insomnia  2002: Left wrist fracture  No date: Lumbar spinal stenosis  No date: Macular degeneration  6/11/2008: Occlusion and stenosis of carotid artery without mention   of cerebral infarction      Comment:  Right carotid on US-moderate  No date: Osteoporosis  No date: Pure hypercholesterolemia  No date: Sciatica  No date: Senile cataract     Surgical History     Past Surgical History:   Procedure Laterality Date     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     FRACTURE SURGERY Left     wrist     LUMBAR LAMINECTOMY  12/2009    fusion     VAGINAL DELIVERY      x4     Family History    Reviewed, and   Family History   Problem Relation Age of Onset     Breast Cancer Mother      Hypertension Sister      Alcoholism Brother         and Drug     Cancer Son         pancreatic     Aneurysm Brother      Other - See Comments Brother         renal failure        Social History      Social History     Tobacco Use     Smoking status: Never Smoker     Smokeless tobacco: Never Used   Substance Use Topics     Alcohol use: No     Drug use: No      Lives at home with her daughter and son-in-law, uses a walker for ambulation  Allergies     Allergies   Allergen Reactions     Cefprozil Shortness Of Breath     Penicillins Nausea and Rash     Has had different cephalosporins in past , tolerates Augmentin ok if benadryl is given together (last given 12/2018)  Tolerated Zosyn 6/2019.   Has had different cephalosporins in past       Ciprofloxacin Hives     Clonidine Other (See Comments)     Extreme  "Fatigue  Extreme Fatigue       Codeine Other (See Comments)     \"felt wired\"    \"felt wired\"       Levofloxacin Other (See Comments)     agitation       Amoxicillin Hives     Cefaclor      Ciprofloxacin Hives     Hydrochlorothiazide Nausea     Spironolactone Other (See Comments)     Patient can't remember what happens     Sulfa Drugs Hives     Zestril [Lisinopril] Other (See Comments)     Patient can not remember what happens     Prior to Admission Medications      Prior to Admission Medications   Prescriptions Last Dose Informant Patient Reported? Taking?   acetaminophen (TYLENOL) 500 MG tablet 4/28/2022 at Unknown time  Yes Yes   Sig: Take 1,000 mg by mouth At Bedtime   atorvastatin (LIPITOR) 20 MG tablet 4/29/2022 at Unknown time  Yes Yes   Sig: Take 20 mg by mouth daily (with lunch)   betamethasone dipropionate (DIPROSONE) 0.05 % external cream Past Week at has with  Yes Yes   Sig: Apply topically 2 times daily as needed   bumetanide (BUMEX) 1 MG tablet 4/29/2022 at Unknown time  Yes Yes   Sig: Take 2 mg by mouth daily before breakfast   bumetanide (BUMEX) 1 MG tablet 4/28/2022 at Unknown time  Yes Yes   Sig: Take 1 mg by mouth At Bedtime   calcium carbonate 500 mg, elemental, (OSCAL 500) 1250 (500 Ca) MG TABS tablet 4/29/2022 at x2  Yes Yes   Sig: Take 1 tablet by mouth 3 times daily   cefdinir (OMNICEF) 300 MG capsule 4/28/2022 at HS  Yes Yes   Sig: Take 300 mg by mouth daily   cephALEXin (KEFLEX) 250 MG capsule 4/27/2022  Yes Yes   Sig: Take 250 mg by mouth daily (with breakfast) Prophylaxis   cholecalciferol 25 MCG (1000 UT) TABS 4/28/2022 at Unknown time  Yes Yes   Sig: Take 1,000 Units by mouth At Bedtime   diltiazem ER (DILT-XR) 180 MG 24 hr capsule 4/29/2022 at am  Yes Yes   Sig: Take 180 mg by mouth daily   diphenhydrAMINE (BENADRYL) 25 MG tablet 4/28/2022 at Unknown time  Yes Yes   Sig: Take 12.5 mg by mouth At Bedtime   estradiol (ESTRACE) 0.1 MG/GM vaginal cream Past Week at Unknown time  Yes Yes "   Sig: Place 1 g vaginally three times a week   famotidine (PEPCID) 20 MG tablet 4/29/2022 at am  Yes Yes   Sig: Take 20 mg by mouth 2 times daily   hydrALAZINE (APRESOLINE) 25 MG tablet 4/29/2022 at am  Yes Yes   Sig: Take 25 mg by mouth 2 times daily   losartan (COZAAR) 100 MG tablet 4/29/2022 at Unknown time  Yes Yes   Sig: Take 100 mg by mouth daily   metoprolol tartrate (LOPRESSOR) 50 MG tablet 4/29/2022 at am  Yes Yes   Sig: Take 75 mg by mouth 2 times daily   senna-docusate (SENOKOT-S/PERICOLACE) 8.6-50 MG tablet 4/28/2022 at Unknown time  Yes Yes   Sig: Take 1 tablet by mouth At Bedtime   vitamin C (ASCORBIC ACID) 1000 MG TABS 4/29/2022 at x2  Yes Yes   Sig: Take 1,000 mg by mouth 3 times daily   warfarin ANTICOAGULANT (COUMADIN) 2.5 MG tablet 4/28/2022 at Unknown time  Yes Yes   Sig: Take 3.75 mg by mouth At Bedtime      Facility-Administered Medications: None      Review of Systems     A 12 point comprehensive review of systems was negative except as noted above in HPI.    PHYSICAL EXAMINATION       Vitals      Temp:  [98.5  F (36.9  C)] 98.5  F (36.9  C)  Pulse:  [85-94] 88  Resp:  [18] 18  BP: (153-178)/(79-90) 169/79  SpO2:  [95 %-96 %] 96 %    Examination     GENERAL:  Alert, appears comfortable, in no acute distress, appears younger than stated age   HEAD:  Normocephalic, without obvious abnormality, atraumatic   NECK: Supple, symmetrical, trachea midline   BACK:   Symmetric, no curvature, ROM normal   LUNGS:   Clear to auscultation bilaterally, no rales, rhonchi, or wheezing, symmetric chest rise on inhalation, respirations unlabored   HEART:  Regular rate and rhythm, S1 and S2 normal, no murmur, rub, or gallop    ABDOMEN:   Soft, non-tender, bowel sounds active all four quadrants, no masses, no organomegaly, no rebound or guarding   EXTREMITIES: Extremities normal, atraumatic, no cyanosis or edema    SKIN: Dry to touch, no exanthems in the visualized areas   NEURO: Alert, oriented x 4, moves all  four extremities freely, non-focal   PSYCH: Cooperative, behavior is appropriate      Pertinent Lab     Most Recent 3 CBC's:Recent Labs   Lab Test 04/29/22  1743 04/19/21  0506 04/18/21  0408   WBC 6.6 6.0 6.3   HGB 12.8 10.7* 10.9*   MCV 89 88 88    199 192     Most Recent 3 BMP's:Recent Labs   Lab Test 04/29/22  1743 04/19/21  0606 04/19/21  0506 04/18/21  0609 04/18/21  0408     --  139  --  139   POTASSIUM 4.3  --  3.6  --  3.9   CHLORIDE 100  --  100  --  102   CO2 23  --  28  --  29   BUN 41*  --  45*  --  33*   CR 1.38*  --  1.32*  --  1.22*   ANIONGAP 15  --  11  --  8   YADY 9.4  --  8.4*  --  8.6    121 112   < > 112    < > = values in this interval not displayed.     Most Recent 2 LFT's:Recent Labs   Lab Test 04/29/22 1743 06/24/20  0656   AST 21 20   ALT 17 18   ALKPHOS 77 90   BILITOTAL 0.5 0.7         Pertinent Radiology     Radiology Results: No results found for this or any previous visit (from the past 24 hour(s)).  EKG Results: personally reviewed.  and not yet available.    Advance Care Planning        Chelsy Rivas MD, [facility]   Phone: #[phone]

## 2022-04-30 NOTE — DISCHARGE SUMMARY
[facility] MEDICINE  DISCHARGE SUMMARY     Primary Care Physician: Martin Stauffer  Admission Date: 4/29/2022   Discharge Provider: Ivan Avalos MD Discharge Date: 4/30/2022   Diet:   Active Diet and Nourishment Order   Procedures     Regular Diet Adult     Diet   Encourage oral hydration Code Status: No CPR- Do NOT Intubate   Activity: DCACTIVITY: Activity as tolerated        Condition at Discharge: Stable     REASON FOR PRESENTATION(See Admission Note for Details)   UTI with resistant organism    PRINCIPAL & ACTIVE DISCHARGE DIAGNOSES     Principal Problem:    UTI (urinary tract infection)  Active Problems:    Urinary retention    Adverse effect of drug or medicament  History of UTI with resistant organisms  Chronic diastolic heart failure  Chronic A. fib  Chronic anticoagulation  HTN  CKD 3  History of CVA    PENDING LABS     Unresulted Labs Ordered in the Past 30 Days of this Admission     Date and Time Order Name Status Description    4/29/2022  6:28 PM Blood Culture Peripheral Blood In process     4/29/2022  6:28 PM Blood Culture Peripheral Blood In process         PROCEDURES ( this hospitalization only)      None    RECOMMENDATIONS TO OUTPATIENT PROVIDER FOR F/U VISIT   Follow-up Appointments     Follow Up      Recheck INR in 3 to 4 days due to interactions with Levaquin and   warfarin.         Follow-up and recommended labs and tests      1.  Follow-up with primary care provider in 1 week.  2.  Follow-up with urology per their recommendations.             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  Seen by primary for UTI.  Initiated on treatment.  Unfortunately urine culture shows multidrug-resistant Morganella which she has had before.  Directed to the emergency room.    1.  UTI.  Multidrug-resistant Morganella cultures reviewed in Care Everywhere.  Copied at the bottom of this note.  Patient initiated on meropenem which she has tolerated before.  Received 2 doses of meropenem and then will transition to Levaquin with Benadryl dosing which she also has tolerated before.  Total course 7 days.  Follow-up with her primary/urology as an outpatient.    Patient was otherwise medically stable.  No changes to her home regimen.  Should recheck INR in 3 to 4 days due to interactions with antibiotics.    Discharge Medications with Med changes:     Current Discharge Medication List      START taking these medications    Details   levofloxacin (LEVAQUIN) 250 MG tablet Take 1 tablet (250 mg) by mouth daily for 6 days  Qty: 6 tablet, Refills: 0    Associated Diagnoses: Acute cystitis without hematuria      tamsulosin (FLOMAX) 0.4 MG capsule Take 1 capsule (0.4 mg) by mouth At Bedtime  Qty: 30 capsule, Refills: 0    Associated Diagnoses: Urinary retention         CONTINUE these medications which have CHANGED    Details   diphenhydrAMINE (BENADRYL) 25 MG tablet Take 0.5 tablets (12.5 mg) by mouth every 6 hours as needed for itching or allergies    Associated Diagnoses: Adverse effect of drug, sequela         CONTINUE these medications which have NOT CHANGED    Details   acetaminophen (TYLENOL) 500 MG tablet Take 1,000 mg by mouth At Bedtime      atorvastatin (LIPITOR) 20 MG tablet Take 20 mg by mouth daily (with lunch)      betamethasone dipropionate (DIPROSONE) 0.05 % external cream Apply topically 2 times daily as needed      !! bumetanide (BUMEX) 1 MG tablet Take 2 mg by mouth daily before breakfast      !! bumetanide (BUMEX) 1 MG tablet Take 1 mg by mouth At Bedtime      calcium carbonate  500 mg, elemental, (OSCAL 500) 1250 (500 Ca) MG TABS tablet Take 1 tablet by mouth 3 times daily      cholecalciferol 25 MCG (1000 UT) TABS Take 1,000 Units by mouth At Bedtime      diltiazem ER (DILT-XR) 180 MG 24 hr capsule Take 180 mg by mouth daily      estradiol (ESTRACE) 0.1 MG/GM vaginal cream Place 1 g vaginally three times a week      famotidine (PEPCID) 20 MG tablet Take 20 mg by mouth 2 times daily      hydrALAZINE (APRESOLINE) 25 MG tablet Take 25 mg by mouth 2 times daily      losartan (COZAAR) 100 MG tablet Take 100 mg by mouth daily      metoprolol tartrate (LOPRESSOR) 50 MG tablet Take 75 mg by mouth 2 times daily      senna-docusate (SENOKOT-S/PERICOLACE) 8.6-50 MG tablet Take 1 tablet by mouth At Bedtime      vitamin C (ASCORBIC ACID) 1000 MG TABS Take 1,000 mg by mouth 3 times daily      warfarin ANTICOAGULANT (COUMADIN) 2.5 MG tablet Take 3.75 mg by mouth At Bedtime       !! - Potential duplicate medications found. Please discuss with provider.      STOP taking these medications       cefdinir (OMNICEF) 300 MG capsule Comments:   Reason for Stopping:         cephALEXin (KEFLEX) 250 MG capsule Comments:   Reason for Stopping:                 Rationale for medication changes:      Levaquin for UTI.  Benadryl for medication management.  Flomax for urinary retention.      Consults     CARE MANAGEMENT / SOCIAL WORK IP CONSULT  INFECTIOUS DISEASES IP CONSULT  PHARMACY TO DOSE WARFARIN      Immunizations given this encounter     Most Recent Immunizations   Administered Date(s) Administered     Influenza (IIV3) PF 08/07/2013           Anticoagulation Information      Recent INR results:   Recent Labs   Lab 04/30/22  0554 04/29/22  1743   INR 2.54* 2.59*     Warfarin doses (if applicable) or name of other anticoagulant: Warfarin continue home regimen      SIGNIFICANT IMAGING FINDINGS     No results found for this visit on 04/29/22.    SIGNIFICANT LABORATORY FINDINGS     Most Recent 3 CBC's:  Recent Labs    Lab Test 04/29/22  1743 04/19/21  0506 04/18/21  0408   WBC 6.6 6.0 6.3   HGB 12.8 10.7* 10.9*   MCV 89 88 88    199 192     Most Recent 3 BMP's:  Recent Labs   Lab Test 04/30/22  0554 04/29/22  1743 04/19/21  0606 04/19/21  0506    138  --  139   POTASSIUM 3.9 4.3  --  3.6   CHLORIDE 106 100  --  100   CO2 24 23  --  28   BUN 31* 41*  --  45*   CR 0.95 1.38*  --  1.32*   ANIONGAP 10 15  --  11   YADY 8.6 9.4  --  8.4*   GLC 99 104 121 112     Most Recent 2 LFT's:  Recent Labs   Lab Test 04/30/22  0554 04/29/22  1743   AST 16 21   ALT 14 17   ALKPHOS 69 77   BILITOTAL 0.7 0.5     Most Recent 3 INR's:  Recent Labs   Lab Test 04/30/22  0554 04/29/22  1743 04/19/21  0506   INR 2.54* 2.59* 2.46*         Discharge Orders        Reason for your hospital stay    Bladder infection with resistant Morganella organisms     Follow-up and recommended labs and tests    1.  Follow-up with primary care provider in 1 week.  2.  Follow-up with urology per their recommendations.     Activity    Your activity upon discharge: activity as tolerated     Follow Up    Recheck INR in 3 to 4 days due to interactions with Levaquin and warfarin.     Diet    Follow this diet upon discharge: Orders Placed This Encounter      Regular Diet Adult.  Encourage fluids.       Examination   Physical Exam   Temp:  [97.1  F (36.2  C)-98.5  F (36.9  C)] 98.2  F (36.8  C)  Pulse:  [79-94] 83  Resp:  [16-20] 16  BP: (146-178)/(63-90) 146/70  SpO2:  [92 %-96 %] 94 %  Wt Readings from Last 4 Encounters:   04/30/22 55.1 kg (121 lb 7 oz)   12/09/13 53.7 kg (118 lb 6.4 oz)   10/15/13 53.4 kg (117 lb 11.2 oz)   02/12/13 55.9 kg (123 lb 3.2 oz)       Constitutional: awake, alert, cooperative, no apparent distress, and appears stated age  Eyes: Lids and lashes normal, pupils equal, round and reactive to light, extra ocular muscles intact, sclera clear, conjunctiva normal  ENT: Normocephalic, without obvious abnormality, atraumatic, sinuses nontender on  palpation, external ears without lesions, oral pharynx with moist mucous membranes, tonsils without erythema or exudates, gums normal and good dentition.  Respiratory: No increased work of breathing, good air exchange, clear to auscultation bilaterally, no crackles or wheezing  Cardiovascular: Normal apical impulse, regular rate and rhythm, normal S1 and S2, no S3 or S4, and no murmur noted  GI: No scars, normal bowel sounds, soft, non-distended, non-tender, no masses palpated, no hepatosplenomegally  Skin: normal skin color, texture, turgor, no redness, warmth, or swelling, and no rashes  Musculoskeletal: There is no redness, warmth, or swelling of the joints.  Full range of motion noted.  Motor strength is 5 out of 5 all extremities bilaterally.  Tone is normal.  1+ lower extremity edema bilaterally.  Neurologic: Cranial nerves II-XII are grossly intact. Sensory:  Sensory intact  Neuropsychiatric: General: normal, calm and normal eye contact Level of consciousness: alert / normal Affect: normal Orientation: oriented to self, place, time and situation Memory and insight: normal, memory for past and recent events intact and thought process normal        URINE CULTURE  Specimen:  Urine - Urine specimen (specimen)  Component 3 d ago   CULTURE  RESULT Abnormal     CULTURE  >100,000 CFU/mL Morganella morganii    Resulting Agency [facility]     Susceptibility    Organism Antibiotic Susceptibility   Morganella morganii TRIMETHOPRIM/SULF <=1/19: S   Morganella morganii AMPICILLIN >=32: R   Morganella morganii CEFAZOLIN >=64: R   Morganella morganii GENTAMICIN <=1: S   Morganella morganii CEFTRIAXONE 2: I   Morganella morganii CEFTAZIDIME 16: R   Morganella morganii LEVOFLOXACIN 0.5: S   Morganella morganii CIPROFLOXACIN <=0.25: S   Morganella morganii PIPERACILLIN/TAZO 8: S   Morganella morganii AMPICILLIN/SULBACTAM >=32: R   Morganella morganii CEFEPIME <=1: S   Morganella morganii  TOBRAMYCIN <=1: S   Morganella morganii MEROPENEM <=0.25: S   Morganella morganii NITROFURANTOIN 128: R   Specimen Collected: 04/27/22  1:20 PM Last Resulted: 04/29/22  6:31 AM   Received From: [facility]  Result Received: 04/29/22  3:55 PM     Please see EMR for more detailed significant labs, imaging, consultant notes etc.    I, Ivan Avalos MD, personally saw the patient today and spent greater than 30 minutes discharging this patient.    Ivan Avalos MD  [facility]    CC:Martin Stauffer

## 2022-04-30 NOTE — PLAN OF CARE
PRIMARY DIAGNOSIS: PYELONEPHRITIS  OUTPATIENT/OBSERVATION GOALS TO BE MET BEFORE DISCHARGE:  Diagnostic test and consults (if applicable) complete: Yes    Vitals signs stable or return to baseline: Yes    Tolerate oral intake to maintain hydration: Yes    Pain status: Pain free.    Tolerating oral antibiotics or has plans for home infusion setup:  No - Pt is on IV abx but plan to switch to po abx later today and discharge home    Return to near baseline physical activity: Yes    Discharge Planner Nurse   Safe discharge environment identified: Yes  Barriers to discharge: No - Pt is on IV abx now but plan is to switch to po abx later today.        Entered by: Trenton Jackson RN 04/30/2022 6:09 AM     Please review provider order for any additional goals.   Nurse to notify provider when observation goals have been met and patient is ready for discharge.Goal Outcome Evaluation:

## 2022-04-30 NOTE — PROGRESS NOTES
PRIMARY DIAGNOSIS: UTI    OUTPATIENT/OBSERVATION GOALS TO BE MET BEFORE DISCHARGE:    1. ADLs back to baseline: Yes     2. Activity and level of assistance: Ambulating independently.     3. Pain status: Pain free.     4. Return to near baseline physical activity: Yes             Discharge Planner Nurse      Safe discharge environment identified: Yes. Home transport to daughter's who she lives with.Planned pick-up @ 1230.     Barriers to discharge: No. Dose of IV antibiotics provided today. Going home on PO antibiotics.      Entered by: Jennifer Canales RN 04/30/2022 11:12 AM     Patient A & O x 4. VSS. No complaints of pain. Ate breakfast. IV ABX provided. Independent in room. Planned discharge at 1230 with transport provided by daughter to home.     Please review provider order for any additional goals.   Nurse to notify provider when observation goals have been met and patient is ready for discharge.

## 2022-04-30 NOTE — PHARMACY-ANTICOAGULATION SERVICE
Clinical Pharmacy - Warfarin Dosing Consult     Pharmacy has been consulted to manage this patient s warfarin therapy.  Indication: Atrial Fibrillation;DVT/ PE Treatment  Therapy Goal: INR 2-3  Provider/Team: Hospitalist  Warfarin Prior to Admission: Yes  Warfarin PTA Regimen: 3.75 mg daily  Significant drug interactions: ABX  Recent documented change in oral intake/nutrition: Unknown  Dose Comments: Home dose    INR   Date Value Ref Range Status   04/29/2022 2.59 (H) 0.85 - 1.15 Final   04/19/2021 2.46 (H) 0.90 - 1.10 Final       Recommend warfarin 3.75 mg today.  Pharmacy will monitor Alva Dumont daily and order warfarin doses to achieve specified goal.      Please contact pharmacy as soon as possible if the warfarin needs to be held for a procedure or if the warfarin goals change.

## 2022-04-30 NOTE — PHARMACY-ADMISSION MEDICATION HISTORY
Pharmacy Note - Admission Medication History    Pertinent Provider Information: started cefdinir for UTI;  Usually takes keflex 250 mg qday for UTI prophylaxis      ______________________________________________________________________    Prior To Admission (PTA) med list completed and updated in EMR.       PTA Med List   Medication Sig Last Dose     acetaminophen (TYLENOL) 500 MG tablet Take 1,000 mg by mouth At Bedtime 4/28/2022 at Unknown time     atorvastatin (LIPITOR) 20 MG tablet Take 20 mg by mouth daily (with lunch) 4/29/2022 at Unknown time     betamethasone dipropionate (DIPROSONE) 0.05 % external cream Apply topically 2 times daily as needed Past Week at has with     bumetanide (BUMEX) 1 MG tablet Take 2 mg by mouth daily before breakfast 4/29/2022 at Unknown time     bumetanide (BUMEX) 1 MG tablet Take 1 mg by mouth At Bedtime 4/28/2022 at Unknown time     calcium carbonate 500 mg, elemental, (OSCAL 500) 1250 (500 Ca) MG TABS tablet Take 1 tablet by mouth 3 times daily 4/29/2022 at x2     cefdinir (OMNICEF) 300 MG capsule Take 300 mg by mouth daily 4/28/2022 at HS     cephALEXin (KEFLEX) 250 MG capsule Take 250 mg by mouth daily (with breakfast) Prophylaxis 4/27/2022     cholecalciferol 25 MCG (1000 UT) TABS Take 1,000 Units by mouth At Bedtime 4/28/2022 at Unknown time     diltiazem ER (DILT-XR) 180 MG 24 hr capsule Take 180 mg by mouth daily 4/29/2022 at am     diphenhydrAMINE (BENADRYL) 25 MG tablet Take 12.5 mg by mouth At Bedtime 4/28/2022 at Unknown time     estradiol (ESTRACE) 0.1 MG/GM vaginal cream Place 1 g vaginally three times a week Past Week at Unknown time     famotidine (PEPCID) 20 MG tablet Take 20 mg by mouth 2 times daily 4/29/2022 at am     hydrALAZINE (APRESOLINE) 25 MG tablet Take 25 mg by mouth 2 times daily 4/29/2022 at am     losartan (COZAAR) 100 MG tablet Take 100 mg by mouth daily 4/29/2022 at Unknown time     metoprolol tartrate (LOPRESSOR) 50 MG tablet Take 75 mg by mouth 2  times daily 4/29/2022 at am     senna-docusate (SENOKOT-S/PERICOLACE) 8.6-50 MG tablet Take 1 tablet by mouth At Bedtime 4/28/2022 at Unknown time     vitamin C (ASCORBIC ACID) 1000 MG TABS Take 1,000 mg by mouth 3 times daily 4/29/2022 at x2     warfarin ANTICOAGULANT (COUMADIN) 2.5 MG tablet Take 3.75 mg by mouth At Bedtime 4/28/2022 at Unknown time       Information source(s): Patient, Family member and CareEverywhere/[facility]  Method of interview communication: in-person    Summary of Changes to PTA Med List  New: meds new to this encounter  Discontinued: old meds dc'd  Changed: warfarin dosing    Patient was asked about OTC/herbal products specifically.  PTA med list reflects this.    In the past week, patient estimated taking medication this percent of the time:  greater than 90%.    Allergies were reviewed, assessed, and updated with the patient.      Medications currently not available for use during hospital stay. Family/Patient representative states they will bring betamethasone to [facility].    The information provided in this note is only as accurate as the sources available at the time of the update(s).    Thank you for the opportunity to participate in the care of this patient.    Angélica Enriquez RP  4/29/2022 8:01 PM

## 2022-04-30 NOTE — PROGRESS NOTES
"PRIMARY DIAGNOSIS: \"GENERIC\" NURSING  OUTPATIENT/OBSERVATION GOALS TO BE MET BEFORE DISCHARGE:  1. ADLs back to baseline: Yes    2. Activity and level of assistance: Ambulating independently.    3. Pain status: Pain free.    4. Return to near baseline physical activity: Yes     Discharge Planner Nurse   Safe discharge environment identified: Yes. Home transport to daughter's who she lives with.    Barriers to discharge: No. Dose of IV antibiotics provided today. Going home on PO antibiotics.          Entered by: Jennifer Canales RN 04/30/2022 11:12 AM    Patient A & ) x 4. VSS. No complaints of pain. Ate breakfast. IV ABX provided. Independent in room.    Please review provider order for any additional goals.   Nurse to notify provider when observation goals have been met and patient is ready for discharge.  "

## 2022-05-01 NOTE — PROGRESS NOTES
Care Management Discharge Note    Discharge Date: 04/30/2022       Discharge Disposition:  home    Discharge Services:  None    Additional Information:  Chart reviewed. Patient was discharged home per bedside RN prior to CM being able to meet with patient.         Lizzeth Xie RN    \

## 2022-05-05 LAB
BACTERIA BLD CULT: NO GROWTH
BACTERIA BLD CULT: NO GROWTH

## 2022-05-06 ENCOUNTER — APPOINTMENT (OUTPATIENT)
Dept: CT IMAGING | Facility: CLINIC | Age: 87
End: 2022-05-06
Payer: MEDICARE

## 2022-05-06 ENCOUNTER — HOSPITAL ENCOUNTER (EMERGENCY)
Facility: CLINIC | Age: 87
Discharge: HOME | End: 2022-05-06
Admitting: NURSE PRACTITIONER
Payer: MEDICARE

## 2022-05-06 VITALS
RESPIRATION RATE: 18 BRPM | BODY MASS INDEX: 26.1 KG/M2 | DIASTOLIC BLOOD PRESSURE: 76 MMHG | HEIGHT: 57 IN | OXYGEN SATURATION: 98 % | WEIGHT: 121 LBS | HEART RATE: 88 BPM | SYSTOLIC BLOOD PRESSURE: 106 MMHG

## 2022-05-06 DIAGNOSIS — K86.2 PANCREATIC CYST: ICD-10-CM

## 2022-05-06 DIAGNOSIS — R10.9 ABDOMINAL PAIN, UNSPECIFIED ABDOMINAL LOCATION: ICD-10-CM

## 2022-05-06 DIAGNOSIS — N28.89 RENAL MASS: ICD-10-CM

## 2022-05-06 DIAGNOSIS — N39.0 UTI (URINARY TRACT INFECTION): ICD-10-CM

## 2022-05-06 LAB
ALBUMIN UR-MCNC: NEGATIVE MG/DL
ANION GAP SERPL CALCULATED.3IONS-SCNC: 13 MMOL/L (ref 5–18)
APPEARANCE UR: ABNORMAL
BACTERIA #/AREA URNS HPF: ABNORMAL /HPF
BILIRUB UR QL STRIP: NEGATIVE
BUN SERPL-MCNC: 43 MG/DL (ref 8–28)
CALCIUM SERPL-MCNC: 9 MG/DL (ref 8.5–10.5)
CHLORIDE BLD-SCNC: 101 MMOL/L (ref 98–107)
CO2 SERPL-SCNC: 24 MMOL/L (ref 22–31)
COLOR UR AUTO: ABNORMAL
CREAT SERPL-MCNC: 1.57 MG/DL (ref 0.6–1.1)
ERYTHROCYTE [DISTWIDTH] IN BLOOD BY AUTOMATED COUNT: 12.8 % (ref 10–15)
GFR SERPL CREATININE-BSD FRML MDRD: 31 ML/MIN/1.73M2
GLUCOSE BLD-MCNC: 99 MG/DL (ref 70–125)
GLUCOSE UR STRIP-MCNC: NEGATIVE MG/DL
HCT VFR BLD AUTO: 34.8 % (ref 35–47)
HGB BLD-MCNC: 11.9 G/DL (ref 11.7–15.7)
HGB UR QL STRIP: NEGATIVE
HYALINE CASTS: 3 /LPF
INR PPP: 2.93 (ref 0.85–1.15)
KETONES UR STRIP-MCNC: NEGATIVE MG/DL
LEUKOCYTE ESTERASE UR QL STRIP: ABNORMAL
MCH RBC QN AUTO: 30 PG (ref 26.5–33)
MCHC RBC AUTO-ENTMCNC: 34.2 G/DL (ref 31.5–36.5)
MCV RBC AUTO: 88 FL (ref 78–100)
NITRATE UR QL: NEGATIVE
PH UR STRIP: 5.5 [PH] (ref 5–7)
PLATELET # BLD AUTO: 171 10E3/UL (ref 150–450)
POTASSIUM BLD-SCNC: 3.9 MMOL/L (ref 3.5–5)
RBC # BLD AUTO: 3.97 10E6/UL (ref 3.8–5.2)
RBC URINE: 6 /HPF
SODIUM SERPL-SCNC: 138 MMOL/L (ref 136–145)
SP GR UR STRIP: 1.01 (ref 1–1.03)
SQUAMOUS EPITHELIAL: <1 /HPF
UROBILINOGEN UR STRIP-MCNC: <2 MG/DL
WBC # BLD AUTO: 6 10E3/UL (ref 4–11)
WBC CLUMPS #/AREA URNS HPF: PRESENT /HPF
WBC URINE: >182 /HPF

## 2022-05-06 PROCEDURE — 99285 EMERGENCY DEPT VISIT HI MDM: CPT | Mod: 25

## 2022-05-06 PROCEDURE — 82310 ASSAY OF CALCIUM: CPT | Performed by: NURSE PRACTITIONER

## 2022-05-06 PROCEDURE — 36415 COLL VENOUS BLD VENIPUNCTURE: CPT | Performed by: NURSE PRACTITIONER

## 2022-05-06 PROCEDURE — 250N000011 HC RX IP 250 OP 636: Performed by: NURSE PRACTITIONER

## 2022-05-06 PROCEDURE — 258N000003 HC RX IP 258 OP 636: Performed by: NURSE PRACTITIONER

## 2022-05-06 PROCEDURE — 96361 HYDRATE IV INFUSION ADD-ON: CPT

## 2022-05-06 PROCEDURE — 74177 CT ABD & PELVIS W/CONTRAST: CPT

## 2022-05-06 PROCEDURE — 72131 CT LUMBAR SPINE W/O DYE: CPT

## 2022-05-06 PROCEDURE — 96365 THER/PROPH/DIAG IV INF INIT: CPT | Mod: 59

## 2022-05-06 PROCEDURE — 87086 URINE CULTURE/COLONY COUNT: CPT | Performed by: EMERGENCY MEDICINE

## 2022-05-06 PROCEDURE — 85610 PROTHROMBIN TIME: CPT | Performed by: NURSE PRACTITIONER

## 2022-05-06 PROCEDURE — 85027 COMPLETE CBC AUTOMATED: CPT | Performed by: NURSE PRACTITIONER

## 2022-05-06 PROCEDURE — 81003 URINALYSIS AUTO W/O SCOPE: CPT | Performed by: EMERGENCY MEDICINE

## 2022-05-06 RX ORDER — IOPAMIDOL 755 MG/ML
100 INJECTION, SOLUTION INTRAVASCULAR ONCE
Status: COMPLETED | OUTPATIENT
Start: 2022-05-06 | End: 2022-05-06

## 2022-05-06 RX ORDER — MEROPENEM 500 MG/1
500 INJECTION, POWDER, FOR SOLUTION INTRAVENOUS ONCE
Status: COMPLETED | OUTPATIENT
Start: 2022-05-06 | End: 2022-05-06

## 2022-05-06 RX ADMIN — MEROPENEM 500 MG: 500 INJECTION, POWDER, FOR SOLUTION INTRAVENOUS at 16:45

## 2022-05-06 RX ADMIN — SODIUM CHLORIDE 250 ML: 9 INJECTION, SOLUTION INTRAVENOUS at 15:18

## 2022-05-06 RX ADMIN — IOPAMIDOL 75 ML: 755 INJECTION, SOLUTION INTRAVENOUS at 15:34

## 2022-05-06 ASSESSMENT — ENCOUNTER SYMPTOMS
FEVER: 0
ABDOMINAL PAIN: 1
FATIGUE: 1
DYSURIA: 0
FLANK PAIN: 0
SHORTNESS OF BREATH: 0
NAUSEA: 0
VOMITING: 0
FREQUENCY: 1
BACK PAIN: 1

## 2022-05-06 NOTE — ED PROVIDER NOTES
EMERGENCY DEPARTMENT ENCOUNTER      NAME: Alva Dumont  AGE: 89 year old female  YOB: 1929  MRN: [id number]  EVALUATION DATE & TIME: 2022  1:31 PM    PCP: Martin Stauffer    ED PROVIDER: JASBIR Hearn, CNP      Chief Complaint   Patient presents with     Back Pain     UTI         FINAL IMPRESSION:  1. Renal mass    2. Pancreatic cyst    3. Abdominal pain, unspecified abdominal location          ED COURSE & MEDICAL DECISION MAKIN:45 PM I met with the patient, obtained history, performed an initial exam, and discussed options and plan for treatment here in the ED.  3:55 PM updated patient  4:26 PM post void residual 80 ml  4:26 PM updated patient.  5:05 PM rechecked patient    Pertinent Labs & Imaging studies reviewed. (See chart for details)  89 year old female presents to the Emergency Department for evaluation of back pain and lower abdominal pain.  Was concerned about worsening UTI.  UA still does suggest UTI.  Reviewed prior culture showing sensitivity to Levaquin.  Will repeat urine culture.  Was given a dose of IV meropenem here today.  He does not have any fever and has a normal white blood cell count.  Has fluctuating renal function and was given to 250 cc normal saline and was here.  I do not see any clear advantage to hospitalizing her at this point for ongoing IV antibiotics.  A postvoid residual was checked and was 80 cc of urine and she does not appear to be retaining any significant urine at this time.  Incidental findings on CT discussed with the patient and family.  Do not think that patient's low back pain is related to any GI or  pathology at this point.  Recommend close outpatient follow-up with PCP or urology.  If worsening, instructed to return here for ongoing evaluation.    At the conclusion of the encounter I discussed the results of all of the tests and the disposition. The questions were answered. The patient or family acknowledged understanding and was  agreeable with the care plan.         MEDICATIONS GIVEN IN THE EMERGENCY:  Medications   meropenem (MERREM) 1 g vial to attach to  mL bag (has no administration in time range)   0.9% sodium chloride BOLUS (250 mLs Intravenous New Bag 5/6/22 6948)   iopamidol (ISOVUE-370) solution 100 mL (75 mLs Intravenous Given 5/6/22 1534)       NEW PRESCRIPTIONS STARTED AT TODAY'S ER VISIT  New Prescriptions    No medications on file            =================================================================    HPI    Patient information was obtained from: patient    Use of Intrepreter: N/A        Alva Dumont is a 89 year old female with a history of A. fib, CHF, chronic back pain, stage III chronic kidney disease, frequent UTIs, macular degeneration, carotid artery stenosis who presents for evaluation of ongoing UTI symptoms.  Currently on Levaquin for UTI.  Most recent urine culture grew Morganella Morganii sensitive to Levaquin. is followed by urology.  Patient does endorse issues with complete bladder emptying.  At her most recent ED visit / hospitalization on 4/29/2022, was started on Levaquin and Flomax.  Starting yesterday she began to note pressure in her lower abdomen.  Also continues to have low back pain she is concerned is related to UTI.  Denies fever, nausea vomiting, change in bowel habits, or other new symptoms.      REVIEW OF SYSTEMS   Review of Systems   Constitutional: Positive for fatigue. Negative for fever.   Respiratory: Negative for shortness of breath.    Gastrointestinal: Positive for abdominal pain. Negative for nausea and vomiting.   Genitourinary: Positive for frequency. Negative for dysuria and flank pain.   Musculoskeletal: Positive for back pain.   All other systems reviewed and are negative.       PAST MEDICAL HISTORY:  Past Medical History:   Diagnosis Date     Adjustment reaction with anxiety and depression      Atrial fibrillation with RVR (H)      Cervical spondylosis with  radiculopathy      CHF (congestive heart failure) (H)      Chronic back pain      Chronic kidney disease, stage 3 (H)      CVA (cerebral infarction)     Incidental old small infarcts seen on MRI 7/2004     Frequent UTI 7/31/12    had pseudomonas UTI and required caftazidime IV; cystitis cystica on cystoscopy     HTN (hypertension)      Hypertrophic obstructive cardiomyopathy (H)      Hypochloremia      Hypokalemia      Hyponatremia      Hyponatremia      Insomnia      Left wrist fracture 2002     Lumbar spinal stenosis      Macular degeneration      Occlusion and stenosis of carotid artery without mention of cerebral infarction 6/11/2008    Right carotid on US-moderate     Osteoporosis      Pure hypercholesterolemia      Sciatica      Senile cataract        PAST SURGICAL HISTORY:  Past Surgical History:   Procedure Laterality Date     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     FRACTURE SURGERY Left     wrist     LUMBAR LAMINECTOMY  12/2009    fusion     VAGINAL DELIVERY      x4           CURRENT MEDICATIONS:    Prior to Admission Medications   Prescriptions Last Dose Informant Patient Reported? Taking?   acetaminophen (TYLENOL) 500 MG tablet   Yes No   Sig: Take 1,000 mg by mouth At Bedtime   atorvastatin (LIPITOR) 20 MG tablet   Yes No   Sig: Take 20 mg by mouth daily (with lunch)   betamethasone dipropionate (DIPROSONE) 0.05 % external cream   Yes No   Sig: Apply topically 2 times daily as needed   bumetanide (BUMEX) 1 MG tablet   Yes No   Sig: Take 2 mg by mouth daily before breakfast   bumetanide (BUMEX) 1 MG tablet   Yes No   Sig: Take 1 mg by mouth At Bedtime   calcium carbonate 500 mg, elemental, (OSCAL 500) 1250 (500 Ca) MG TABS tablet   Yes No   Sig: Take 1 tablet by mouth 3 times daily   cholecalciferol 25 MCG (1000 UT) TABS   Yes No   Sig: Take 1,000 Units by mouth At Bedtime   diltiazem ER (DILT-XR) 180 MG 24 hr capsule   Yes No   Sig: Take 180 mg by mouth daily   diphenhydrAMINE (BENADRYL) 25 MG  "tablet   No No   Sig: Take 0.5 tablets (12.5 mg) by mouth every 6 hours as needed for itching or allergies   estradiol (ESTRACE) 0.1 MG/GM vaginal cream   Yes No   Sig: Place 1 g vaginally three times a week   famotidine (PEPCID) 20 MG tablet   Yes No   Sig: Take 20 mg by mouth 2 times daily   hydrALAZINE (APRESOLINE) 25 MG tablet   Yes No   Sig: Take 25 mg by mouth 2 times daily   levofloxacin (LEVAQUIN) 250 MG tablet   No No   Sig: Take 1 tablet (250 mg) by mouth daily for 6 days   losartan (COZAAR) 100 MG tablet   Yes No   Sig: Take 100 mg by mouth daily   metoprolol tartrate (LOPRESSOR) 50 MG tablet   Yes No   Sig: Take 75 mg by mouth 2 times daily   senna-docusate (SENOKOT-S/PERICOLACE) 8.6-50 MG tablet   Yes No   Sig: Take 1 tablet by mouth At Bedtime   tamsulosin (FLOMAX) 0.4 MG capsule   No No   Sig: Take 1 capsule (0.4 mg) by mouth At Bedtime   vitamin C (ASCORBIC ACID) 1000 MG TABS   Yes No   Sig: Take 1,000 mg by mouth 3 times daily   warfarin ANTICOAGULANT (COUMADIN) 2.5 MG tablet   Yes No   Sig: Take 3.75 mg by mouth At Bedtime      Facility-Administered Medications: None           ALLERGIES:  Allergies   Allergen Reactions     Cefprozil Shortness Of Breath     Penicillins Nausea and Rash     Has had different cephalosporins in past , tolerates Augmentin ok if benadryl is given together (last given 12/2018)  Tolerated Zosyn 6/2019.   Has had different cephalosporins in past       Ciprofloxacin Hives     Clonidine Other (See Comments)     Extreme Fatigue  Extreme Fatigue       Codeine Other (See Comments)     \"felt wired\"    \"felt wired\"       Levofloxacin Other (See Comments)     agitation       Amoxicillin Hives     Aspirin Unknown     Cefaclor      Ciprofloxacin Hives     Hydrochlorothiazide Nausea     Oxycodone Itching     Spironolactone Other (See Comments)     Patient can't remember what happens     Sulfa Drugs Hives     Zestril [Lisinopril] Other (See Comments)     Patient can not remember what " "happens       FAMILY HISTORY:  Family History   Problem Relation Age of Onset     Breast Cancer Mother      Hypertension Sister      Alcoholism Brother         and Drug     Cancer Son         pancreatic     Aneurysm Brother      Other - See Comments Brother         renal failure       SOCIAL HISTORY:   Social History     Socioeconomic History     Marital status:    Tobacco Use     Smoking status: Never Smoker     Smokeless tobacco: Never Used   Substance and Sexual Activity     Alcohol use: No     Drug use: No         VITALS:  Patient Vitals for the past 24 hrs:   BP Temp src Pulse Resp SpO2 Height Weight   05/06/22 1229 106/76 Oral 88 18 98 % 1.448 m (4' 9\") 54.9 kg (121 lb)       PHYSICAL EXAM    Constitutional:  Alert, no distress  EYES: Conjunctivae clear  HENT:  Atraumatic, normocephalic   Respiratory:  No respiratory distress, normal breath sounds  Cardiovascular:  Normal rate, normal rhythm, no murmurs  GI:  Soft, nondistended, suprapubic tenderness.  No guarding  Integument: Warm, Dry  Neurologic:  Alert & oriented x 3              LAB:  All pertinent labs reviewed and interpreted.  Results for orders placed or performed during the hospital encounter of 05/06/22   CT Abdomen Pelvis w Contrast    Impression    IMPRESSION:   1.  No explanation for abdominal pain.  2.  An 2.4 cm left renal mass. Recommend a nonemergent contrast enhanced magnetic resonance imaging examination of the kidneys for further evaluation.  3.  Nodular hepatic contour which is suggestive of fibrosis/cirrhosis.  4.  A 1.0 cm pancreatic cyst. See guidelines below.    REFERENCE:  Revisions of international consensus [place] guidelines for the management of IPMN of the pancreas. Pancreatology 2017;17(5):738-753.    Largest cyst between 10-20 mm: CT or MRI/MRCP every 6 months for 1 year and then yearly for 2 years and then every 2 years if no change.      Lumbar spine CT w/o contrast    Impression    IMPRESSION:  1.  Advanced " thoracolumbar spondylosis with prior posterior instrumented fusion at L4-5 and solid-appearing fusion across the posterior elements at this level without findings for hardware failure or loosening. No finding for acute fracture or convincing   finding for posttraumatic subluxation. Multilevel low-grade listheses are favored to be degenerative in etiology.    2.  Multilevel foraminal stenosis as described. No high-grade canal stenosis.    3.  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   UA with Microscopic reflex to Culture    Specimen: Urine, Clean Catch   Result Value Ref Range    Color Urine Light Yellow Colorless, Straw, Light Yellow, Yellow    Appearance Urine Turbid (A) Clear    Glucose Urine Negative Negative mg/dL    Bilirubin Urine Negative Negative    Ketones Urine Negative Negative mg/dL    Specific Gravity Urine 1.008 1.001 - 1.030    Blood Urine Negative Negative    pH Urine 5.5 5.0 - 7.0    Protein Albumin Urine Negative Negative mg/dL    Urobilinogen Urine <2.0 <2.0 mg/dL    Nitrite Urine Negative Negative    Leukocyte Esterase Urine 500 Martin/uL (A) Negative    Bacteria Urine Few (A) None Seen /HPF    WBC Clumps Urine Present (A) None Seen /HPF    RBC Urine 6 (H) <=2 /HPF    WBC Urine >182 (H) <=5 /HPF    Squamous Epithelials Urine <1 <=1 /HPF    Hyaline Casts Urine 3 (H) <=2 /LPF   CBC (+ platelets, no diff)   Result Value Ref Range    WBC Count 6.0 4.0 - 11.0 10e3/uL    RBC Count 3.97 3.80 - 5.20 10e6/uL    Hemoglobin 11.9 11.7 - 15.7 g/dL    Hematocrit 34.8 (L) 35.0 - 47.0 %    MCV 88 78 - 100 fL    MCH 30.0 26.5 - 33.0 pg    MCHC 34.2 31.5 - 36.5 g/dL    RDW 12.8 10.0 - 15.0 %    Platelet Count 171 150 - 450 10e3/uL   Basic metabolic panel   Result Value Ref Range    Sodium 138 136 - 145 mmol/L    Potassium 3.9 3.5 - 5.0 mmol/L    Chloride 101 98 - 107 mmol/L    Carbon Dioxide (CO2) 24 22 - 31 mmol/L    Anion Gap 13 5 - 18 mmol/L     Urea Nitrogen 43 (H) 8 - 28 mg/dL    Creatinine 1.57 (H) 0.60 - 1.10 mg/dL    Calcium 9.0 8.5 - 10.5 mg/dL    Glucose 99 70 - 125 mg/dL    GFR Estimate 31 (L) >60 mL/min/1.73m2   Result Value Ref Range    INR 2.93 (H) 0.85 - 1.15       RADIOLOGY:  Reviewed all pertinent imaging. Please see official radiology report.  Lumbar spine CT w/o contrast   Preliminary Result   IMPRESSION:   1.  Advanced thoracolumbar spondylosis with prior posterior instrumented fusion at L4-5 and solid-appearing fusion across the posterior elements at this level without findings for hardware failure or loosening. No finding for acute fracture or convincing    finding for posttraumatic subluxation. Multilevel low-grade listheses are favored to be degenerative in etiology.      2.  Multilevel foraminal stenosis as described. No high-grade canal stenosis.      3.  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      CT Abdomen Pelvis w Contrast   Final Result   IMPRESSION:    1.  No explanation for abdominal pain.   2.  An 2.4 cm left renal mass. Recommend a nonemergent contrast enhanced magnetic resonance imaging examination of the kidneys for further evaluation.   3.  Nodular hepatic contour which is suggestive of fibrosis/cirrhosis.   4.  A 1.0 cm pancreatic cyst. See guidelines below.      REFERENCE:   Revisions of international consensus [place] guidelines for the management of IPMN of the pancreas. Pancreatology 2017;17(5):738-753.      Largest cyst between 10-20 mm: CT or MRI/MRCP every 6 months for 1 year and then yearly for 2 years and then every 2 years if no change.                PROCEDURES:   None      JASBIR Hearn, CNP  Emergency Medicine  [facility] EMERGENCY ROOM  [address] [id number] [phone]  Dept: [phone]         Trenton Olsen APRN CNP  05/06/22 8606

## 2022-05-06 NOTE — ED TRIAGE NOTES
Patient presents to the ED  With complaints of continued low back pain and is currently finishing a course of Levaquin.

## 2022-05-08 LAB — BACTERIA UR CULT: ABNORMAL

## 2022-05-09 ENCOUNTER — HOSPITAL ENCOUNTER (EMERGENCY)
Facility: CLINIC | Age: 87
Discharge: HOME | End: 2022-05-09
Attending: EMERGENCY MEDICINE | Admitting: EMERGENCY MEDICINE
Payer: MEDICARE

## 2022-05-09 ENCOUNTER — APPOINTMENT (OUTPATIENT)
Dept: CT IMAGING | Facility: CLINIC | Age: 87
End: 2022-05-09
Attending: EMERGENCY MEDICINE
Payer: MEDICARE

## 2022-05-09 VITALS
BODY MASS INDEX: 26.18 KG/M2 | HEART RATE: 104 BPM | OXYGEN SATURATION: 95 % | RESPIRATION RATE: 20 BRPM | SYSTOLIC BLOOD PRESSURE: 171 MMHG | TEMPERATURE: 98 F | DIASTOLIC BLOOD PRESSURE: 94 MMHG | WEIGHT: 121 LBS

## 2022-05-09 DIAGNOSIS — M54.50 LOW BACK PAIN WITHOUT SCIATICA, UNSPECIFIED BACK PAIN LATERALITY, UNSPECIFIED CHRONICITY: ICD-10-CM

## 2022-05-09 LAB
ALBUMIN SERPL-MCNC: 3.8 G/DL (ref 3.5–5)
ALBUMIN UR-MCNC: NEGATIVE MG/DL
ALP SERPL-CCNC: 75 U/L (ref 45–120)
ALT SERPL W P-5'-P-CCNC: 23 U/L (ref 0–45)
ANION GAP SERPL CALCULATED.3IONS-SCNC: 12 MMOL/L (ref 5–18)
APPEARANCE UR: CLEAR
AST SERPL W P-5'-P-CCNC: 24 U/L (ref 0–40)
BASOPHILS # BLD AUTO: 0 10E3/UL (ref 0–0.2)
BASOPHILS NFR BLD AUTO: 0 %
BILIRUB DIRECT SERPL-MCNC: 0.1 MG/DL
BILIRUB SERPL-MCNC: 0.4 MG/DL (ref 0–1)
BILIRUB UR QL STRIP: NEGATIVE
BUN SERPL-MCNC: 33 MG/DL (ref 8–28)
C REACTIVE PROTEIN LHE: 0.3 MG/DL (ref 0–0.8)
CALCIUM SERPL-MCNC: 8.7 MG/DL (ref 8.5–10.5)
CHLORIDE BLD-SCNC: 104 MMOL/L (ref 98–107)
CO2 SERPL-SCNC: 25 MMOL/L (ref 22–31)
COLOR UR AUTO: COLORLESS
CREAT SERPL-MCNC: 1.55 MG/DL (ref 0.6–1.1)
EOSINOPHIL # BLD AUTO: 0.3 10E3/UL (ref 0–0.7)
EOSINOPHIL NFR BLD AUTO: 7 %
ERYTHROCYTE [DISTWIDTH] IN BLOOD BY AUTOMATED COUNT: 13 % (ref 10–15)
GFR SERPL CREATININE-BSD FRML MDRD: 31 ML/MIN/1.73M2
GLUCOSE BLD-MCNC: 106 MG/DL (ref 70–125)
GLUCOSE UR STRIP-MCNC: NEGATIVE MG/DL
HCT VFR BLD AUTO: 32.4 % (ref 35–47)
HGB BLD-MCNC: 10.7 G/DL (ref 11.7–15.7)
HGB UR QL STRIP: NEGATIVE
IMM GRANULOCYTES # BLD: 0 10E3/UL
IMM GRANULOCYTES NFR BLD: 1 %
KETONES UR STRIP-MCNC: NEGATIVE MG/DL
LACTATE SERPL-SCNC: 0.7 MMOL/L (ref 0.7–2)
LEUKOCYTE ESTERASE UR QL STRIP: NEGATIVE
LIPASE SERPL-CCNC: 34 U/L (ref 0–52)
LYMPHOCYTES # BLD AUTO: 1.1 10E3/UL (ref 0.8–5.3)
LYMPHOCYTES NFR BLD AUTO: 23 %
MCH RBC QN AUTO: 28.9 PG (ref 26.5–33)
MCHC RBC AUTO-ENTMCNC: 33 G/DL (ref 31.5–36.5)
MCV RBC AUTO: 88 FL (ref 78–100)
MONOCYTES # BLD AUTO: 0.7 10E3/UL (ref 0–1.3)
MONOCYTES NFR BLD AUTO: 15 %
NEUTROPHILS # BLD AUTO: 2.5 10E3/UL (ref 1.6–8.3)
NEUTROPHILS NFR BLD AUTO: 54 %
NITRATE UR QL: NEGATIVE
NRBC # BLD AUTO: 0 10E3/UL
NRBC BLD AUTO-RTO: 0 /100
PH UR STRIP: 6.5 [PH] (ref 5–7)
PLATELET # BLD AUTO: 160 10E3/UL (ref 150–450)
POTASSIUM BLD-SCNC: 3.7 MMOL/L (ref 3.5–5)
PROT SERPL-MCNC: 6.9 G/DL (ref 6–8)
RBC # BLD AUTO: 3.7 10E6/UL (ref 3.8–5.2)
RBC URINE: 2 /HPF
SARS-COV-2 RNA RESP QL NAA+PROBE: NEGATIVE
SODIUM SERPL-SCNC: 141 MMOL/L (ref 136–145)
SP GR UR STRIP: 1.03 (ref 1–1.03)
SQUAMOUS EPITHELIAL: <1 /HPF
UROBILINOGEN UR STRIP-MCNC: <2 MG/DL
WBC # BLD AUTO: 4.6 10E3/UL (ref 4–11)
WBC URINE: <1 /HPF

## 2022-05-09 PROCEDURE — 250N000011 HC RX IP 250 OP 636: Performed by: EMERGENCY MEDICINE

## 2022-05-09 PROCEDURE — 36415 COLL VENOUS BLD VENIPUNCTURE: CPT | Performed by: EMERGENCY MEDICINE

## 2022-05-09 PROCEDURE — 81001 URINALYSIS AUTO W/SCOPE: CPT | Performed by: EMERGENCY MEDICINE

## 2022-05-09 PROCEDURE — 80053 COMPREHEN METABOLIC PANEL: CPT | Performed by: EMERGENCY MEDICINE

## 2022-05-09 PROCEDURE — C9803 HOPD COVID-19 SPEC COLLECT: HCPCS

## 2022-05-09 PROCEDURE — 74177 CT ABD & PELVIS W/CONTRAST: CPT

## 2022-05-09 PROCEDURE — 86140 C-REACTIVE PROTEIN: CPT | Performed by: EMERGENCY MEDICINE

## 2022-05-09 PROCEDURE — 87040 BLOOD CULTURE FOR BACTERIA: CPT | Mod: XU | Performed by: EMERGENCY MEDICINE

## 2022-05-09 PROCEDURE — 258N000003 HC RX IP 258 OP 636: Performed by: EMERGENCY MEDICINE

## 2022-05-09 PROCEDURE — 96365 THER/PROPH/DIAG IV INF INIT: CPT | Mod: 59

## 2022-05-09 PROCEDURE — 96366 THER/PROPH/DIAG IV INF ADDON: CPT

## 2022-05-09 PROCEDURE — 99285 EMERGENCY DEPT VISIT HI MDM: CPT | Mod: 25

## 2022-05-09 PROCEDURE — 83690 ASSAY OF LIPASE: CPT | Performed by: EMERGENCY MEDICINE

## 2022-05-09 PROCEDURE — U0005 INFEC AGEN DETEC AMPLI PROBE: HCPCS | Performed by: EMERGENCY MEDICINE

## 2022-05-09 PROCEDURE — 85014 HEMATOCRIT: CPT | Performed by: EMERGENCY MEDICINE

## 2022-05-09 PROCEDURE — 82248 BILIRUBIN DIRECT: CPT | Performed by: EMERGENCY MEDICINE

## 2022-05-09 PROCEDURE — 87040 BLOOD CULTURE FOR BACTERIA: CPT | Performed by: EMERGENCY MEDICINE

## 2022-05-09 PROCEDURE — 83605 ASSAY OF LACTIC ACID: CPT | Performed by: EMERGENCY MEDICINE

## 2022-05-09 RX ORDER — CEFTRIAXONE 1 G/1
1 INJECTION, POWDER, FOR SOLUTION INTRAMUSCULAR; INTRAVENOUS ONCE
Status: COMPLETED | OUTPATIENT
Start: 2022-05-09 | End: 2022-05-09

## 2022-05-09 RX ORDER — LIDOCAINE 50 MG/G
1 PATCH TOPICAL EVERY 24 HOURS
Qty: 15 PATCH | Refills: 0 | Status: SHIPPED | OUTPATIENT
Start: 2022-05-09 | End: 2022-09-16

## 2022-05-09 RX ORDER — IOPAMIDOL 755 MG/ML
100 INJECTION, SOLUTION INTRAVASCULAR ONCE
Status: COMPLETED | OUTPATIENT
Start: 2022-05-09 | End: 2022-05-09

## 2022-05-09 RX ORDER — CYCLOBENZAPRINE HCL 10 MG
10 TABLET ORAL 3 TIMES DAILY PRN
Qty: 20 TABLET | Refills: 0 | Status: SHIPPED | OUTPATIENT
Start: 2022-05-09 | End: 2022-05-15

## 2022-05-09 RX ADMIN — SODIUM CHLORIDE 500 ML: 9 INJECTION, SOLUTION INTRAVENOUS at 19:29

## 2022-05-09 RX ADMIN — CEFTRIAXONE 1 G: 1 INJECTION, POWDER, FOR SOLUTION INTRAMUSCULAR; INTRAVENOUS at 19:29

## 2022-05-09 RX ADMIN — IOPAMIDOL 62 ML: 755 INJECTION, SOLUTION INTRAVENOUS at 20:35

## 2022-05-09 ASSESSMENT — ENCOUNTER SYMPTOMS
DYSURIA: 0
FATIGUE: 1
FEVER: 0
DIARRHEA: 0
DIFFICULTY URINATING: 1
FLANK PAIN: 1
FREQUENCY: 1
CONSTIPATION: 0

## 2022-05-09 NOTE — ED TRIAGE NOTES
Patient was called from this facility to come back as her urine culture showed that she needs a change to her antibiotic. She does endorse left sided flank pain.

## 2022-05-09 NOTE — ED PROVIDER NOTES
EMERGENCY DEPARTMENT ENCOUNTER       ED Course & Medical Decision Making     5:51 PM I met with the patient, obtained history, performed an initial exam, and discussed options and plan for diagnostics and treatment here in the ED. PPE worn including surgical mask, surgical gloves, surgical cap.    I saw and examined the patient.  I reviewed her prior work-ups.  Diagnostics ordered.  CT does not show any interval change.  Her urinalysis looks normal.  Her laboratory studies are fine and she states that her pain is actually quite controlled.  It does look like her urinary tract infection has resolved and I do not see any evidence for pyelonephritis or anything else more significant or worrisome.  I suspect that this is musculoskeletal and related to her chronic back issues.  She states that she is done well with muscle relaxers in the past and plan is to have her use Tylenol as first-line, lidocaine patches, referral to spine but I will prescribe short course of some Flexeril that she can try as well.      Prior to making a final disposition on this patient the results of patient's tests and other diagnostic studies were discussed with the patient. All questions were answered. Patient expressed understanding of the plan and was amenable to it.    Medications   0.9% sodium chloride BOLUS (0 mLs Intravenous Stopped 5/9/22 2119)   cefTRIAXone (ROCEPHIN) 1 g vial to attach to  mL bag for ADULTS or NS 50 mL bag for PEDS (0 g Intravenous Stopped 5/9/22 2119)   iopamidol (ISOVUE-370) solution 100 mL (62 mLs Intravenous Given 5/9/22 2035)       Final Impression     1. Low back pain without sciatica, unspecified back pain laterality, unspecified chronicity            Chief Complaint     Chief Complaint   Patient presents with     Abnormal Labs       Patient was called from this facility to come back as her urine culture showed that she needs a change to her antibiotic. She does endorse left sided flank pain.            HPI       Alva Dumont is a 89 year old female who presents for evaluation of urinary frequency, flank pain. Patient reports she was called last night by a PA of this hospital as a urine culture grew out bacteria that was resistant to the antibiotic she is currently on for her ongoing UTI and was told to report back to the hospital. Currently, patient endorses left sided flank pain, urinary frequency, and an inability to fully empty her bladder, fatigue, low back pain worse with walking. Patient has been taking Tylenol for her symptoms. Denies fever, chest pain, dysuria, changes in bowel habits. No other reported complaints at this time.    Per chart review, patient was seen at this ED on 05/06/2022. Patient initially presented with emptying her bladder. UA during the visit was positive and urine culture grew out 50,000-100,000 CFU/mL E.Coli resistant to cipro and levofloxacin. She was given a dose of IV meropenem in the ED. Prior urine culture grew out M. Morganii sensitive to Levaquin from prior hospitalization on 04/29 and was started on Levaquin and Flomax which she continues to take.    I, Humberto Avendano am serving as a scribe to document services personally performed by Chinmay Bob M.D. based on my observation and the provider's statements to me. I, Chinmay Bob M.D attest that Humberto Avendano is acting in a scribe capacity, has observed my performance of the services and has documented them in accordance with my direction.    Past Medical History     Past Medical History:   Diagnosis Date     Adjustment reaction with anxiety and depression      Atrial fibrillation with RVR (H)      Cervical spondylosis with radiculopathy      CHF (congestive heart failure) (H)      Chronic back pain      Chronic kidney disease, stage 3 (H)      CVA (cerebral infarction)      Frequent UTI 7/31/12     HTN (hypertension)      Hypertrophic obstructive cardiomyopathy (H)      Hypochloremia       Hypokalemia      Hyponatremia      Hyponatremia      Insomnia      Left wrist fracture 2002     Lumbar spinal stenosis      Macular degeneration      Occlusion and stenosis of carotid artery without mention of cerebral infarction 6/11/2008     Osteoporosis      Pure hypercholesterolemia      Sciatica      Senile cataract      Past Surgical History:   Procedure Laterality Date     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     FRACTURE SURGERY Left     wrist     LUMBAR LAMINECTOMY  12/2009    fusion     VAGINAL DELIVERY      x4     Family History   Problem Relation Age of Onset     Breast Cancer Mother      Hypertension Sister      Alcoholism Brother         and Drug     Cancer Son         pancreatic     Aneurysm Brother      Other - See Comments Brother         renal failure      Social History     Tobacco Use     Smoking status: Never Smoker     Smokeless tobacco: Never Used   Substance Use Topics     Alcohol use: No     Drug use: No       Relevant past medical, surgical, family and social history as documented above, has been reviewed and discussed with patient. No changes or additions, unless otherwise noted in the HPI.    Current Medications     cyclobenzaprine (FLEXERIL) 10 MG tablet  lidocaine (LIDODERM) 5 % patch  acetaminophen (TYLENOL) 500 MG tablet  atorvastatin (LIPITOR) 20 MG tablet  betamethasone dipropionate (DIPROSONE) 0.05 % external cream  bumetanide (BUMEX) 1 MG tablet  bumetanide (BUMEX) 1 MG tablet  calcium carbonate 500 mg, elemental, (OSCAL 500) 1250 (500 Ca) MG TABS tablet  cholecalciferol 25 MCG (1000 UT) TABS  diltiazem ER (DILT-XR) 180 MG 24 hr capsule  diphenhydrAMINE (BENADRYL) 25 MG tablet  estradiol (ESTRACE) 0.1 MG/GM vaginal cream  famotidine (PEPCID) 20 MG tablet  hydrALAZINE (APRESOLINE) 25 MG tablet  losartan (COZAAR) 100 MG tablet  metoprolol tartrate (LOPRESSOR) 50 MG tablet  senna-docusate (SENOKOT-S/PERICOLACE) 8.6-50 MG tablet  tamsulosin (FLOMAX) 0.4 MG capsule  vitamin  "C (ASCORBIC ACID) 1000 MG TABS  warfarin ANTICOAGULANT (COUMADIN) 2.5 MG tablet        Allergies     Allergies   Allergen Reactions     Cefprozil Shortness Of Breath     Oxycodone-Acetaminophen Itching     Penicillins Nausea and Rash     Has had different cephalosporins in past , tolerates Augmentin ok if benadryl is given together (last given 12/2018)  Tolerated Zosyn 6/2019.   Has had different cephalosporins in past       Sulfa Drugs Hives and Other (See Comments)     Other reaction(s): Edema, Other (See Comments)  Swelling in hands and feet    Swelling in hands and feet  Other Reaction(s): Edema  Swelling in hands and feet       Ciprofloxacin Hives     Clonidine Other (See Comments)     Extreme Fatigue  Extreme Fatigue       Codeine Other (See Comments)     \"felt wired\"    \"felt wired\"       Levofloxacin Other (See Comments)     agitation       Cefaclor      Hydrochlorothiazide Nausea     Nitrofurantoin      Oxycodone Itching     Spironolactone Other (See Comments)     Patient can't remember what happens     Zestril [Lisinopril] Other (See Comments)     Patient can not remember what happens     Amoxicillin Hives and Rash     Has had different cephalosporins in past     Aspirin Unknown and Other (See Comments)     Other reaction(s): Other (See Comments)  Contraindication with Coumadin Therapy    Contraindication with Coumadin Therapy  Contraindication with Coumadin Therapy         Review of Systems     Review of Systems   Constitutional: Positive for fatigue. Negative for fever.   Cardiovascular: Negative for chest pain.   Gastrointestinal: Negative for constipation and diarrhea.   Genitourinary: Positive for difficulty urinating (retention), flank pain (left sided) and frequency. Negative for dysuria.   All other systems reviewed and are negative.       Remainder of systems reviewed, unless noted in HPI all others negative.    Physical Exam     BP (!) 171/94   Pulse 104   Temp 98  F (36.7  C) (Oral)   Resp 20 "   Wt 54.9 kg (121 lb)   SpO2 95%   BMI 26.18 kg/m      Physical Exam  Vitals and nursing note reviewed.   HENT:      Head: Normocephalic.      Nose: Nose normal.   Cardiovascular:      Rate and Rhythm: Normal rate.   Pulmonary:      Effort: Pulmonary effort is normal.   Musculoskeletal:      Comments: Reproducible left lumbar paraspinal tenderness   Neurological:      Mental Status: She is alert. Mental status is at baseline.   Psychiatric:         Mood and Affect: Mood normal.             Labs & Imaging         Labs Ordered and Resulted from Time of ED Arrival to Time of ED Departure   ROUTINE UA WITH MICROSCOPIC REFLEX TO CULTURE - Abnormal       Result Value    Color Urine Colorless      Appearance Urine Clear      Glucose Urine Negative      Bilirubin Urine Negative      Ketones Urine Negative      Specific Gravity Urine 1.034 (*)     Blood Urine Negative      pH Urine 6.5      Protein Albumin Urine Negative      Urobilinogen Urine <2.0      Nitrite Urine Negative      Leukocyte Esterase Urine Negative      RBC Urine 2      WBC Urine <1      Squamous Epithelials Urine <1     COMPREHENSIVE METABOLIC PANEL - Abnormal    Sodium 141      Potassium 3.7      Chloride 104      Carbon Dioxide (CO2) 25      Anion Gap 12      Urea Nitrogen 33 (*)     Creatinine 1.55 (*)     Calcium 8.7      Glucose 106      Alkaline Phosphatase 75      AST 24      ALT 23      Protein Total 6.9      Albumin 3.8      Bilirubin Total 0.4      GFR Estimate 31 (*)    CBC WITH PLATELETS AND DIFFERENTIAL - Abnormal    WBC Count 4.6      RBC Count 3.70 (*)     Hemoglobin 10.7 (*)     Hematocrit 32.4 (*)     MCV 88      MCH 28.9      MCHC 33.0      RDW 13.0      Platelet Count 160      % Neutrophils 54      % Lymphocytes 23      % Monocytes 15      % Eosinophils 7      % Basophils 0      % Immature Granulocytes 1      NRBCs per 100 WBC 0      Absolute Neutrophils 2.5      Absolute Lymphocytes 1.1      Absolute Monocytes 0.7      Absolute  Eosinophils 0.3      Absolute Basophils 0.0      Absolute Immature Granulocytes 0.0      Absolute NRBCs 0.0     LIPASE - Normal    Lipase 34     LACTIC ACID WHOLE BLOOD - Normal    Lactic Acid 0.7     CRP INFLAMMATION - Normal    CRP 0.3     COVID-19 VIRUS (CORONAVIRUS) BY PCR - Normal    SARS CoV2 PCR Negative     BILIRUBIN DIRECT - Normal    Bilirubin Direct 0.1     BLOOD CULTURE   BLOOD CULTURE         Results for orders placed or performed during the hospital encounter of 05/09/22   CT Abdomen Pelvis w Contrast    Impression    IMPRESSION:   1.  No change in the appearance of either kidney in the short interval. Specifically, no evidence of pyelonephritis or obstruction. No calculi.  2.  Complex, presumed enhancing exophytic 2.2 cm mass from the lower pole of the left kidney again noted with suggestion of some fatty elements. It  has slowly increased in size in the last decade. This either reflects an indolent RCC or perhaps an  AML   that has increased in size. Additional evaluation based on clinical need.  3.  Other noncritical findings as noted above.   UA with Microscopic reflex to Culture    Specimen: Urine, Clean Catch   Result Value Ref Range    Color Urine Colorless Colorless, Straw, Light Yellow, Yellow    Appearance Urine Clear Clear    Glucose Urine Negative Negative mg/dL    Bilirubin Urine Negative Negative    Ketones Urine Negative Negative mg/dL    Specific Gravity Urine 1.034 (H) 1.001 - 1.030    Blood Urine Negative Negative    pH Urine 6.5 5.0 - 7.0    Protein Albumin Urine Negative Negative mg/dL    Urobilinogen Urine <2.0 <2.0 mg/dL    Nitrite Urine Negative Negative    Leukocyte Esterase Urine Negative Negative    RBC Urine 2 <=2 /HPF    WBC Urine <1 <=5 /HPF    Squamous Epithelials Urine <1 <=1 /HPF   Comprehensive metabolic panel   Result Value Ref Range    Sodium 141 136 - 145 mmol/L    Potassium 3.7 3.5 - 5.0 mmol/L    Chloride 104 98 - 107 mmol/L    Carbon Dioxide (CO2) 25 22 - 31  mmol/L    Anion Gap 12 5 - 18 mmol/L    Urea Nitrogen 33 (H) 8 - 28 mg/dL    Creatinine 1.55 (H) 0.60 - 1.10 mg/dL    Calcium 8.7 8.5 - 10.5 mg/dL    Glucose 106 70 - 125 mg/dL    Alkaline Phosphatase 75 45 - 120 U/L    AST 24 0 - 40 U/L    ALT 23 0 - 45 U/L    Protein Total 6.9 6.0 - 8.0 g/dL    Albumin 3.8 3.5 - 5.0 g/dL    Bilirubin Total 0.4 0.0 - 1.0 mg/dL    GFR Estimate 31 (L) >60 mL/min/1.73m2   Result Value Ref Range    Lipase 34 0 - 52 U/L   Lactic acid whole blood   Result Value Ref Range    Lactic Acid 0.7 0.7 - 2.0 mmol/L   CRP inflammation   Result Value Ref Range    CRP 0.3 0.0 - 0.8 mg/dL   Asymptomatic COVID-19 Virus (Coronavirus) by PCR Nasopharyngeal    Specimen: Nasopharyngeal; Swab   Result Value Ref Range    SARS CoV2 PCR Negative Negative   CBC with platelets and differential   Result Value Ref Range    WBC Count 4.6 4.0 - 11.0 10e3/uL    RBC Count 3.70 (L) 3.80 - 5.20 10e6/uL    Hemoglobin 10.7 (L) 11.7 - 15.7 g/dL    Hematocrit 32.4 (L) 35.0 - 47.0 %    MCV 88 78 - 100 fL    MCH 28.9 26.5 - 33.0 pg    MCHC 33.0 31.5 - 36.5 g/dL    RDW 13.0 10.0 - 15.0 %    Platelet Count 160 150 - 450 10e3/uL    % Neutrophils 54 %    % Lymphocytes 23 %    % Monocytes 15 %    % Eosinophils 7 %    % Basophils 0 %    % Immature Granulocytes 1 %    NRBCs per 100 WBC 0 <1 /100    Absolute Neutrophils 2.5 1.6 - 8.3 10e3/uL    Absolute Lymphocytes 1.1 0.8 - 5.3 10e3/uL    Absolute Monocytes 0.7 0.0 - 1.3 10e3/uL    Absolute Eosinophils 0.3 0.0 - 0.7 10e3/uL    Absolute Basophils 0.0 0.0 - 0.2 10e3/uL    Absolute Immature Granulocytes 0.0 <=0.4 10e3/uL    Absolute NRBCs 0.0 10e3/uL   Result Value Ref Range    Bilirubin Direct 0.1 <=0.5 mg/dL             Chinmay Bob MD  Emergency Medicine  [facility] EMERGENCY ROOM  [facility] [facility] [address] [phone]  5/9/2022       Chinmay Bob MD  05/09/22 1224

## 2022-05-10 ENCOUNTER — PATIENT OUTREACH (OUTPATIENT)
Dept: CARE COORDINATION | Facility: CLINIC | Age: 87
End: 2022-05-10
Payer: MEDICARE

## 2022-05-10 DIAGNOSIS — Z71.89 OTHER SPECIFIED COUNSELING: ICD-10-CM

## 2022-05-10 NOTE — PROGRESS NOTES
"Clinic Care Coordination Contact  [facility]: Post-Discharge Note  SITUATION                                                      Admission:    Admission Date: 05/09/22   Reason for Admission: Abnormal labs  Discharge:   Discharge Date: 05/09/22  Discharge Diagnosis: Low back pain without sciatica, unspecified back pain laterality, unspecified chronicity    BACKGROUND                                                      Per hospital discharge summary and inpatient provider notes:    Alva Dmuont is a 89 year old female who presents for evaluation of urinary frequency, flank pain. Patient reports she was called last night by a PA of this hospital as a urine culture grew out bacteria that was resistant to the antibiotic she is currently on for her ongoing UTI and was told to report back to the hospital. Currently, patient endorses left sided flank pain, urinary frequency, and an inability to fully empty her bladder, fatigue, low back pain worse with walking. Patient has been taking Tylenol for her symptoms. Denies fever, chest pain, dysuria, changes in bowel habits. No other reported complaints at this time.    ASSESSMENT      Enrollment  Primary Care Care Coordination Status: Not a Candidate    Discharge Assessment  How are you doing now that you are home?: \"She's still sore, she's laying on a heating pad.\"  How are your symptoms? (Red Flag symptoms escalate to triage hotline per guidelines): Improved  Do you feel your condition is stable enough to be safe at home until your provider visit?: Yes  Does the patient have their discharge instructions? : Yes  Does the patient have questions regarding their discharge instructions? : No  Were you started on any new medications or were there changes to any of your previous medications? : Yes  Does the patient have all of their medications?: Yes  Do you have questions regarding any of your medications? : Yes (see comment) (She is wondering if she should continue " taking medication to prevent UTIs that she was taking prior to admission. [facility] reccomended f/up with urologist or PCP since it appears that the reason she returned was for an antibiotic resistant UTI)  Do you have all of your needed medical supplies or equipment (DME)?  (i.e. oxygen tank, CPAP, cane, etc.): Yes  Discharge follow-up appointment scheduled within 14 calendar days? : No  Is patient agreeable to assistance with scheduling? : No    Post-op ([facility] Only)  If the patient had a surgery or procedure, do they have any questions for a nurse?: No      PLAN                                                      Outpatient Plan:      Schedule an appointment with [facility] (Physical Therapy)    Follow up with [facility] (EMERGENCY MEDICINE); If symptoms worsen    Future Appointments   Date Time Provider Department Center   5/19/2022  2:20 PM Patricio Kimball DO [facility]         For any urgent concerns, please contact our 24 hour nurse triage line: [phone] ([address] [place] [address])       Angélica Gonzalez  Community Health Worker  [facility], [facility]  Ph: [phone]

## 2022-05-14 LAB
BACTERIA BLD CULT: NO GROWTH
BACTERIA BLD CULT: NO GROWTH

## 2022-09-16 ENCOUNTER — HOSPITAL ENCOUNTER (INPATIENT)
Facility: CLINIC | Age: 87
LOS: 6 days | Discharge: HOME HEALTH | DRG: 177 | End: 2022-09-22
Attending: EMERGENCY MEDICINE | Admitting: FAMILY MEDICINE
Payer: MEDICARE

## 2022-09-16 ENCOUNTER — APPOINTMENT (OUTPATIENT)
Dept: RADIOLOGY | Facility: CLINIC | Age: 87
DRG: 177 | End: 2022-09-16
Attending: EMERGENCY MEDICINE
Payer: MEDICARE

## 2022-09-16 DIAGNOSIS — I48.91 ATRIAL FIBRILLATION WITH RVR (H): Primary | ICD-10-CM

## 2022-09-16 DIAGNOSIS — U07.1 INFECTION DUE TO 2019 NOVEL CORONAVIRUS: ICD-10-CM

## 2022-09-16 DIAGNOSIS — R09.02 HYPOXIA: ICD-10-CM

## 2022-09-16 PROBLEM — M47.22 CERVICAL SPONDYLOSIS WITH RADICULOPATHY: Status: ACTIVE | Noted: 2022-09-16

## 2022-09-16 PROBLEM — I63.9 CEREBRAL INFARCTION (H): Status: ACTIVE | Noted: 2022-09-16

## 2022-09-16 PROBLEM — M48.061 LUMBAR SPINAL STENOSIS: Status: ACTIVE | Noted: 2022-09-16

## 2022-09-16 PROBLEM — E78.00 PURE HYPERCHOLESTEROLEMIA: Status: ACTIVE | Noted: 2022-09-16

## 2022-09-16 PROBLEM — H35.30 MACULAR DEGENERATION: Status: ACTIVE | Noted: 2022-09-16

## 2022-09-16 PROBLEM — M54.9 CHRONIC BACK PAIN: Status: ACTIVE | Noted: 2022-09-16

## 2022-09-16 PROBLEM — G89.29 CHRONIC BACK PAIN: Status: ACTIVE | Noted: 2022-09-16

## 2022-09-16 PROBLEM — M81.0 OSTEOPOROSIS: Status: ACTIVE | Noted: 2022-09-16

## 2022-09-16 LAB
ALBUMIN SERPL-MCNC: 3.6 G/DL (ref 3.5–5)
ALBUMIN UR-MCNC: 10 MG/DL
ALP SERPL-CCNC: 92 U/L (ref 45–120)
ALT SERPL W P-5'-P-CCNC: 18 U/L (ref 0–45)
ANION GAP SERPL CALCULATED.3IONS-SCNC: 11 MMOL/L (ref 5–18)
APPEARANCE UR: ABNORMAL
AST SERPL W P-5'-P-CCNC: 25 U/L (ref 0–40)
BACTERIA #/AREA URNS HPF: ABNORMAL /HPF
BASOPHILS # BLD AUTO: 0 10E3/UL (ref 0–0.2)
BASOPHILS NFR BLD AUTO: 0 %
BILIRUB SERPL-MCNC: 0.8 MG/DL (ref 0–1)
BILIRUB UR QL STRIP: NEGATIVE
BUN SERPL-MCNC: 17 MG/DL (ref 8–28)
C REACTIVE PROTEIN LHE: 0.2 MG/DL (ref 0–?)
CALCIUM SERPL-MCNC: 8.5 MG/DL (ref 8.5–10.5)
CHLORIDE BLD-SCNC: 93 MMOL/L (ref 98–107)
CO2 SERPL-SCNC: 31 MMOL/L (ref 22–31)
COLOR UR AUTO: ABNORMAL
CREAT SERPL-MCNC: 0.9 MG/DL (ref 0.6–1.1)
D DIMER PPP FEU-MCNC: 0.37 UG/ML FEU (ref 0–0.5)
EOSINOPHIL # BLD AUTO: 0.1 10E3/UL (ref 0–0.7)
EOSINOPHIL NFR BLD AUTO: 2 %
ERYTHROCYTE [DISTWIDTH] IN BLOOD BY AUTOMATED COUNT: 13.1 % (ref 10–15)
FLUAV RNA SPEC QL NAA+PROBE: NEGATIVE
FLUBV RNA RESP QL NAA+PROBE: NEGATIVE
GFR SERPL CREATININE-BSD FRML MDRD: 59 ML/MIN/1.73M2
GLUCOSE BLD-MCNC: 121 MG/DL (ref 70–125)
GLUCOSE UR STRIP-MCNC: NEGATIVE MG/DL
HCT VFR BLD AUTO: 36.5 % (ref 35–47)
HGB BLD-MCNC: 12.1 G/DL (ref 11.7–15.7)
HGB UR QL STRIP: NEGATIVE
HYALINE CASTS: 3 /LPF
IMM GRANULOCYTES # BLD: 0 10E3/UL
IMM GRANULOCYTES NFR BLD: 1 %
INR PPP: 6.25 (ref 0.85–1.15)
KETONES UR STRIP-MCNC: NEGATIVE MG/DL
LEUKOCYTE ESTERASE UR QL STRIP: ABNORMAL
LIPASE SERPL-CCNC: 34 U/L (ref 0–52)
LYMPHOCYTES # BLD AUTO: 1.1 10E3/UL (ref 0.8–5.3)
LYMPHOCYTES NFR BLD AUTO: 28 %
MCH RBC QN AUTO: 28 PG (ref 26.5–33)
MCHC RBC AUTO-ENTMCNC: 33.2 G/DL (ref 31.5–36.5)
MCV RBC AUTO: 85 FL (ref 78–100)
MONOCYTES # BLD AUTO: 0.6 10E3/UL (ref 0–1.3)
MONOCYTES NFR BLD AUTO: 15 %
MUCOUS THREADS #/AREA URNS LPF: PRESENT /LPF
NEUTROPHILS # BLD AUTO: 2.3 10E3/UL (ref 1.6–8.3)
NEUTROPHILS NFR BLD AUTO: 54 %
NITRATE UR QL: NEGATIVE
NRBC # BLD AUTO: 0 10E3/UL
NRBC BLD AUTO-RTO: 0 /100
PH UR STRIP: 6.5 [PH] (ref 5–7)
PLATELET # BLD AUTO: 170 10E3/UL (ref 150–450)
POTASSIUM BLD-SCNC: 3.3 MMOL/L (ref 3.5–5)
PROT SERPL-MCNC: 6.7 G/DL (ref 6–8)
RBC # BLD AUTO: 4.32 10E6/UL (ref 3.8–5.2)
RBC URINE: 5 /HPF
RSV RNA SPEC NAA+PROBE: NEGATIVE
SARS-COV-2 RNA RESP QL NAA+PROBE: POSITIVE
SODIUM SERPL-SCNC: 135 MMOL/L (ref 136–145)
SP GR UR STRIP: 1.01 (ref 1–1.03)
SQUAMOUS EPITHELIAL: 3 /HPF
TROPONIN I SERPL-MCNC: 0.02 NG/ML (ref 0–0.29)
UROBILINOGEN UR STRIP-MCNC: <2 MG/DL
WBC # BLD AUTO: 4.2 10E3/UL (ref 4–11)
WBC URINE: 4 /HPF

## 2022-09-16 PROCEDURE — 96365 THER/PROPH/DIAG IV INF INIT: CPT

## 2022-09-16 PROCEDURE — 83690 ASSAY OF LIPASE: CPT | Performed by: EMERGENCY MEDICINE

## 2022-09-16 PROCEDURE — 250N000013 HC RX MED GY IP 250 OP 250 PS 637: Performed by: FAMILY MEDICINE

## 2022-09-16 PROCEDURE — 71045 X-RAY EXAM CHEST 1 VIEW: CPT

## 2022-09-16 PROCEDURE — C9803 HOPD COVID-19 SPEC COLLECT: HCPCS

## 2022-09-16 PROCEDURE — 80053 COMPREHEN METABOLIC PANEL: CPT | Performed by: EMERGENCY MEDICINE

## 2022-09-16 PROCEDURE — 36415 COLL VENOUS BLD VENIPUNCTURE: CPT | Performed by: EMERGENCY MEDICINE

## 2022-09-16 PROCEDURE — XW033E5 INTRODUCTION OF REMDESIVIR ANTI-INFECTIVE INTO PERIPHERAL VEIN, PERCUTANEOUS APPROACH, NEW TECHNOLOGY GROUP 5: ICD-10-PCS | Performed by: FAMILY MEDICINE

## 2022-09-16 PROCEDURE — 96366 THER/PROPH/DIAG IV INF ADDON: CPT

## 2022-09-16 PROCEDURE — 250N000013 HC RX MED GY IP 250 OP 250 PS 637: Performed by: STUDENT IN AN ORGANIZED HEALTH CARE EDUCATION/TRAINING PROGRAM

## 2022-09-16 PROCEDURE — 258N000003 HC RX IP 258 OP 636: Performed by: EMERGENCY MEDICINE

## 2022-09-16 PROCEDURE — 87637 SARSCOV2&INF A&B&RSV AMP PRB: CPT | Performed by: EMERGENCY MEDICINE

## 2022-09-16 PROCEDURE — 82040 ASSAY OF SERUM ALBUMIN: CPT | Performed by: EMERGENCY MEDICINE

## 2022-09-16 PROCEDURE — 258N000003 HC RX IP 258 OP 636: Performed by: STUDENT IN AN ORGANIZED HEALTH CARE EDUCATION/TRAINING PROGRAM

## 2022-09-16 PROCEDURE — 85610 PROTHROMBIN TIME: CPT | Performed by: EMERGENCY MEDICINE

## 2022-09-16 PROCEDURE — 81001 URINALYSIS AUTO W/SCOPE: CPT | Performed by: EMERGENCY MEDICINE

## 2022-09-16 PROCEDURE — 250N000011 HC RX IP 250 OP 636: Performed by: STUDENT IN AN ORGANIZED HEALTH CARE EDUCATION/TRAINING PROGRAM

## 2022-09-16 PROCEDURE — 96375 TX/PRO/DX INJ NEW DRUG ADDON: CPT

## 2022-09-16 PROCEDURE — 99285 EMERGENCY DEPT VISIT HI MDM: CPT | Mod: 25

## 2022-09-16 PROCEDURE — 93005 ELECTROCARDIOGRAM TRACING: CPT | Performed by: EMERGENCY MEDICINE

## 2022-09-16 PROCEDURE — 250N000011 HC RX IP 250 OP 636: Performed by: EMERGENCY MEDICINE

## 2022-09-16 PROCEDURE — 86140 C-REACTIVE PROTEIN: CPT | Performed by: STUDENT IN AN ORGANIZED HEALTH CARE EDUCATION/TRAINING PROGRAM

## 2022-09-16 PROCEDURE — 85379 FIBRIN DEGRADATION QUANT: CPT | Performed by: STUDENT IN AN ORGANIZED HEALTH CARE EDUCATION/TRAINING PROGRAM

## 2022-09-16 PROCEDURE — 85025 COMPLETE CBC W/AUTO DIFF WBC: CPT | Performed by: EMERGENCY MEDICINE

## 2022-09-16 PROCEDURE — 84484 ASSAY OF TROPONIN QUANT: CPT | Performed by: EMERGENCY MEDICINE

## 2022-09-16 PROCEDURE — 120N000001 HC R&B MED SURG/OB

## 2022-09-16 RX ORDER — PROCHLORPERAZINE MALEATE 5 MG
5 TABLET ORAL EVERY 6 HOURS PRN
Status: DISCONTINUED | OUTPATIENT
Start: 2022-09-16 | End: 2022-09-22 | Stop reason: HOSPADM

## 2022-09-16 RX ORDER — PROCHLORPERAZINE 25 MG
12.5 SUPPOSITORY, RECTAL RECTAL EVERY 12 HOURS PRN
Status: DISCONTINUED | OUTPATIENT
Start: 2022-09-16 | End: 2022-09-22 | Stop reason: HOSPADM

## 2022-09-16 RX ORDER — CALCIUM CARBONATE 500(1250)
500 TABLET ORAL
Status: DISCONTINUED | OUTPATIENT
Start: 2022-09-17 | End: 2022-09-22 | Stop reason: HOSPADM

## 2022-09-16 RX ORDER — HYDRALAZINE HYDROCHLORIDE 25 MG/1
25 TABLET, FILM COATED ORAL 2 TIMES DAILY
Status: DISCONTINUED | OUTPATIENT
Start: 2022-09-16 | End: 2022-09-22 | Stop reason: HOSPADM

## 2022-09-16 RX ORDER — IBUPROFEN 200 MG
1 CAPSULE ORAL 3 TIMES DAILY
Status: DISCONTINUED | OUTPATIENT
Start: 2022-09-17 | End: 2022-09-16 | Stop reason: CLARIF

## 2022-09-16 RX ORDER — FAMOTIDINE 20 MG/1
20 TABLET, FILM COATED ORAL DAILY
Status: DISCONTINUED | OUTPATIENT
Start: 2022-09-17 | End: 2022-09-20

## 2022-09-16 RX ORDER — ONDANSETRON 2 MG/ML
4 INJECTION INTRAMUSCULAR; INTRAVENOUS EVERY 30 MIN PRN
Status: DISCONTINUED | OUTPATIENT
Start: 2022-09-16 | End: 2022-09-22 | Stop reason: HOSPADM

## 2022-09-16 RX ORDER — TAMSULOSIN HYDROCHLORIDE 0.4 MG/1
0.4 CAPSULE ORAL AT BEDTIME
Status: DISCONTINUED | OUTPATIENT
Start: 2022-09-16 | End: 2022-09-22 | Stop reason: HOSPADM

## 2022-09-16 RX ORDER — DILTIAZEM HYDROCHLORIDE 180 MG/1
180 CAPSULE, EXTENDED RELEASE ORAL DAILY
Status: DISCONTINUED | OUTPATIENT
Start: 2022-09-17 | End: 2022-09-22 | Stop reason: HOSPADM

## 2022-09-16 RX ORDER — LOSARTAN POTASSIUM 50 MG/1
100 TABLET ORAL DAILY
Status: DISCONTINUED | OUTPATIENT
Start: 2022-09-17 | End: 2022-09-22 | Stop reason: HOSPADM

## 2022-09-16 RX ORDER — AMOXICILLIN 250 MG
1 CAPSULE ORAL AT BEDTIME
Status: DISCONTINUED | OUTPATIENT
Start: 2022-09-16 | End: 2022-09-22 | Stop reason: HOSPADM

## 2022-09-16 RX ORDER — BUMETANIDE 0.25 MG/ML
2 INJECTION INTRAMUSCULAR; INTRAVENOUS EVERY 12 HOURS
Status: DISCONTINUED | OUTPATIENT
Start: 2022-09-16 | End: 2022-09-19

## 2022-09-16 RX ORDER — LIDOCAINE 40 MG/G
CREAM TOPICAL
Status: DISCONTINUED | OUTPATIENT
Start: 2022-09-16 | End: 2022-09-22 | Stop reason: HOSPADM

## 2022-09-16 RX ORDER — POTASSIUM CHLORIDE 1.5 G/1.58G
40 POWDER, FOR SOLUTION ORAL ONCE
Status: COMPLETED | OUTPATIENT
Start: 2022-09-16 | End: 2022-09-16

## 2022-09-16 RX ORDER — DEXAMETHASONE SODIUM PHOSPHATE 10 MG/ML
10 INJECTION, SOLUTION INTRAMUSCULAR; INTRAVENOUS ONCE
Status: COMPLETED | OUTPATIENT
Start: 2022-09-16 | End: 2022-09-16

## 2022-09-16 RX ORDER — MV-MIN/FA/VIT K/LUTEIN/ZEAXANT 200MCG-5MG
1 CAPSULE ORAL EVERY EVENING
COMMUNITY
End: 2024-08-24

## 2022-09-16 RX ORDER — ATORVASTATIN CALCIUM 10 MG/1
20 TABLET, FILM COATED ORAL
Status: DISCONTINUED | OUTPATIENT
Start: 2022-09-17 | End: 2022-09-22 | Stop reason: HOSPADM

## 2022-09-16 RX ORDER — ACETAMINOPHEN 325 MG/1
650 TABLET ORAL EVERY 8 HOURS PRN
Status: DISCONTINUED | OUTPATIENT
Start: 2022-09-16 | End: 2022-09-22 | Stop reason: HOSPADM

## 2022-09-16 RX ADMIN — METOPROLOL TARTRATE 75 MG: 50 TABLET, FILM COATED ORAL at 22:53

## 2022-09-16 RX ADMIN — DEXAMETHASONE SODIUM PHOSPHATE 10 MG: 10 INJECTION, SOLUTION INTRAMUSCULAR; INTRAVENOUS at 20:33

## 2022-09-16 RX ADMIN — TAMSULOSIN HYDROCHLORIDE 0.4 MG: 0.4 CAPSULE ORAL at 22:54

## 2022-09-16 RX ADMIN — POTASSIUM CHLORIDE 40 MEQ: 1.5 FOR SOLUTION ORAL at 23:30

## 2022-09-16 RX ADMIN — ONDANSETRON 4 MG: 2 INJECTION INTRAMUSCULAR; INTRAVENOUS at 18:21

## 2022-09-16 RX ADMIN — BUMETANIDE 2 MG: 0.25 INJECTION, SOLUTION INTRAMUSCULAR; INTRAVENOUS at 22:51

## 2022-09-16 RX ADMIN — REMDESIVIR 200 MG: 100 INJECTION, POWDER, LYOPHILIZED, FOR SOLUTION INTRAVENOUS at 22:38

## 2022-09-16 RX ADMIN — CEPHALEXIN 250 MG: 250 CAPSULE ORAL at 23:30

## 2022-09-16 RX ADMIN — SODIUM CHLORIDE 500 ML: 9 INJECTION, SOLUTION INTRAVENOUS at 18:21

## 2022-09-16 ASSESSMENT — ACTIVITIES OF DAILY LIVING (ADL)
ADLS_ACUITY_SCORE: 35

## 2022-09-16 NOTE — ED NOTES
Bed: [facility]ED-14  Expected date: 9/16/22  Expected time: 4:48 PM  Means of arrival: Ambulance  Comments:

## 2022-09-16 NOTE — ED PROVIDER NOTES
EMERGENCY DEPARTMENT ENCOUNTER      NAME: Alva Dumont  AGE: 89 year old female  YOB: 1929  MRN: [id number]  EVALUATION DATE & TIME: 9/16/2022  4:57 PM    PCP: Martin Stauffer    ED PROVIDER: Gage Parkinson M.D.      Chief Complaint   Patient presents with     Covid Concern         FINAL IMPRESSION:  1. Infection due to 2019 novel coronavirus    2. Hypoxia          ED COURSE & MEDICAL DECISION MAKING:    Pertinent Labs & Imaging studies reviewed. (See chart for details)  89 year old female presents to the Emergency Department for evaluation of cough, diarrhea, nausea. Patient appears non toxic with stable vitals signs, patient is afebrile, no tachycardia or hypoxia at rest. Overall exam is benign.  Lungs are clear and abdomen is benign.  Patient appears in no respiratory distress.  She states that approximately 5 days ago she did a home test for COVID which was positive secondary to developing fever.  She denies chest pain, pleurisy, no ripping or tearing chest discomfort the back or shoulders, vomiting, no blood in her urine or stools.  Certainly, concern for COVID, considered influenza, pneumonia, her story is atypical for ACS, CHF, nothing to suggest PE or dissection, esophageal rupture, GI bleed, mesenteric ischemia.  Considered but low suspicion for UTI she denies any urinary symptoms.  We will obtain screening labs, urine studies, confirm her COVID and get a chest x-ray and ECG.  Patient given fluids and antiemetics.    Reassessment: Labs showed no acute concerning findings, did note potassium of 3.3.  Troponin negative and ECG nonischemic.  Did note elevated INR but the patient has no signs of active bleeding, will recommend holding warfarin and rechecking INR.  Urine showed leukocytes but no blood or nitrates and she denies any urinary symptoms.  Chest x-ray reported no acute concerning findings.  COVID test here was positive, influenza negative.  Patient was hypoxic with minimal  ambulation.  Feel she would benefit from admission for supplemental oxygen and treatment of her COVID.  Patient was given Decadron here.  Discussed these findings recommendations with the patient who is in agreement with admission.  Offered transfer as there is no current inpatient beds and the patient deferred.  Discussed care plan with the admitting service.    At the conclusion of the encounter I discussed the results of all of the tests and the disposition. The questions were answered and return precautions provided. The patient or family acknowledged understanding and was agreeable with the care plan.     5:17 PM I met with the patient to gather history and to perform my initial exam. We discussed plans for the ED course, including diagnostic testing and treatment.   8:18 PM I rechecked and updated the patient with results. Repeat exam unchanged. We discussed findings and the need for admission.  8:36 PM I spoke with Dr. Gutierrez of [facility] concerning the patient.    MEDICATIONS GIVEN IN THE EMERGENCY:  Medications   ondansetron (ZOFRAN) injection 4 mg (4 mg Intravenous Given 9/16/22 1821)   lidocaine 1 % 0.1-1 mL (has no administration in time range)   lidocaine (LMX4) cream (has no administration in time range)   sodium chloride (PF) 0.9% PF flush 3 mL (3 mLs Intracatheter Given 9/16/22 2253)   sodium chloride (PF) 0.9% PF flush 3 mL (has no administration in time range)   Medication instructions: Do NOT use nebulized medications (has no administration in time range)   dexamethasone (DECADRON) tablet 6 mg (has no administration in time range)   remdesivir 200 mg in sodium chloride 0.9 % 250 mL intermittent infusion (200 mg Intravenous New Bag 9/16/22 2238)     Followed by   0.9% sodium chloride BOLUS (has no administration in time range)   remdesivir 100 mg in sodium chloride 0.9 % 250 mL intermittent infusion (has no administration in time range)     And   0.9% sodium chloride BOLUS (has no  administration in time range)   acetaminophen (TYLENOL) tablet 650 mg (has no administration in time range)   prochlorperazine (COMPAZINE) injection 5 mg (has no administration in time range)     Or   prochlorperazine (COMPAZINE) tablet 5 mg (has no administration in time range)     Or   prochlorperazine (COMPAZINE) suppository 12.5 mg (has no administration in time range)   Warfarin Dose Required Daily - Pharmacist Managed (has no administration in time range)   warfarin-No DOSE today (has no administration in time range)   atorvastatin (LIPITOR) tablet 20 mg (has no administration in time range)   cephALEXin (KEFLEX) capsule 250 mg (250 mg Oral Given 9/16/22 2330)   diltiazem ER (DILT-XR) 24 hr capsule 180 mg (has no administration in time range)   famotidine (PEPCID) tablet 20 mg (has no administration in time range)   hydrALAZINE (APRESOLINE) tablet 25 mg (25 mg Oral Not Given 9/16/22 2254)   losartan (COZAAR) tablet 100 mg (has no administration in time range)   metoprolol tartrate (LOPRESSOR) tablet 75 mg (75 mg Oral Given 9/16/22 2253)   senna-docusate (SENOKOT-S/PERICOLACE) 8.6-50 MG per tablet 1 tablet (1 tablet Oral Not Given 9/16/22 2254)   tamsulosin (FLOMAX) capsule 0.4 mg (0.4 mg Oral Given 9/16/22 2254)   bumetanide (BUMEX) injection 2 mg (2 mg Intravenous Given 9/16/22 2251)   calcium carbonate 500 mg (elemental) (OSCAL) tablet 500 mg (has no administration in time range)   0.9% sodium chloride BOLUS (0 mLs Intravenous Stopped 9/16/22 1847)   dexamethasone PF (DECADRON) injection 10 mg (10 mg Intravenous Given 9/16/22 2033)   potassium chloride (KLOR-CON) Packet 40 mEq (40 mEq Oral or Feeding Tube Given 9/16/22 2330)       NEW PRESCRIPTIONS STARTED AT TODAY'S ER VISIT  New Prescriptions    No medications on file            =================================================================    HPI    Patient information was obtained from: patient     Use of Intrepreter: N/A       Alva wright a 89  year old female who presents with nausea, diarrhea, and cough. Patient states she was feeling feverish and decided to take an at home COVID-19 test which was positive on 9/111/2022 (5 days ago). Her fever has since resolved, but she also states she had one episode of vomiting, diarrhea, and nausea during this time. Patient took a tylenol at 3:00 PM today and reports her symptoms improved with this. Of note, patient lives at home with her daughter and son in law.    No chest pain, persistent vomiting, productive cough, dysuria, focal weakness, or any other medical concerns are expressed at this time.      REVIEW OF SYSTEMS   Constitutional:  Denies fever, chills  Respiratory:  Denies productive cough or increased work of breathing  Cardiovascular:  Denies chest pain, palpitations  GI:  Denies abdominal pain, persistent vomiting, or change in bowel or bladder habits. Endorses nausea and diarrhea   Musculoskeletal:  Denies any new muscle/joint swelling  Skin:  Denies rash   Neurologic:  Denies focal weakness  All systems negative except as marked.     PAST MEDICAL HISTORY:  Past Medical History:   Diagnosis Date     Adjustment reaction with anxiety and depression      Atrial fibrillation with RVR (H)      Cervical spondylosis with radiculopathy      CHF (congestive heart failure) (H)      Chronic back pain      Chronic kidney disease, stage 3 (H)      CVA (cerebral infarction)     Incidental old small infarcts seen on MRI 7/2004     Frequent UTI 7/31/12    had pseudomonas UTI and required caftazidime IV; cystitis cystica on cystoscopy     HTN (hypertension)      Hypertrophic obstructive cardiomyopathy (H)      Hypochloremia      Hypokalemia      Hyponatremia      Hyponatremia      Insomnia      Left wrist fracture 2002     Lumbar spinal stenosis      Macular degeneration      Occlusion and stenosis of carotid artery without mention of cerebral infarction 6/11/2008    Right carotid on US-moderate     Osteoporosis       Pure hypercholesterolemia      Sciatica      Senile cataract        PAST SURGICAL HISTORY:  Past Surgical History:   Procedure Laterality Date     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 Authorizing Provider   acetaminophen (TYLENOL) 500 MG tablet Take 1,000 mg by mouth At Bedtime    Unknown, Entered By History   atorvastatin (LIPITOR) 20 MG tablet Take 20 mg by mouth daily (with lunch)    Unknown, Entered By History   betamethasone dipropionate (DIPROSONE) 0.05 % external cream Apply topically 2 times daily as needed    Unknown, Entered By History   bumetanide (BUMEX) 1 MG tablet Take 2 mg by mouth daily before breakfast    Unknown, Entered By History   bumetanide (BUMEX) 1 MG tablet Take 1 mg by mouth At Bedtime    Unknown, Entered By History   calcium carbonate 500 mg, elemental, (OSCAL 500) 1250 (500 Ca) MG TABS tablet Take 1 tablet by mouth 3 times daily    Unknown, Entered By History   cholecalciferol 25 MCG (1000 UT) TABS Take 1,000 Units by mouth At Bedtime    Unknown, Entered By History   diltiazem ER (DILT-XR) 180 MG 24 hr capsule Take 180 mg by mouth daily    Unknown, Entered By History   diphenhydrAMINE (BENADRYL) 25 MG tablet Take 0.5 tablets (12.5 mg) by mouth every 6 hours as needed for itching or allergies 4/30/22   Ivan Avalos MD   estradiol (ESTRACE) 0.1 MG/GM vaginal cream Place 1 g vaginally three times a week    Unknown, Entered By History   famotidine (PEPCID) 20 MG tablet Take 20 mg by mouth 2 times daily    Unknown, Entered By History   hydrALAZINE (APRESOLINE) 25 MG tablet Take 25 mg by mouth 2 times daily    Unknown, Entered By History   lidocaine (LIDODERM) 5 % patch Place 1 patch onto the skin every 24 hours To prevent lidocaine toxicity, patient should be patch free  "for 12 hrs daily. 5/9/22   Chinmay Bob MD   losartan (COZAAR) 100 MG tablet Take 100 mg by mouth daily    Unknown, Entered By History   metoprolol tartrate (LOPRESSOR) 50 MG tablet Take 75 mg by mouth 2 times daily    Unknown, Entered By History   senna-docusate (SENOKOT-S/PERICOLACE) 8.6-50 MG tablet Take 1 tablet by mouth At Bedtime    Unknown, Entered By History   tamsulosin (FLOMAX) 0.4 MG capsule Take 1 capsule (0.4 mg) by mouth At Bedtime 4/30/22   Ivan Avalos MD   vitamin C (ASCORBIC ACID) 1000 MG TABS Take 1,000 mg by mouth 3 times daily    Unknown, Entered By History   warfarin ANTICOAGULANT (COUMADIN) 2.5 MG tablet Take 3.75 mg by mouth At Bedtime    Reported, Patient        ALLERGIES:  Allergies   Allergen Reactions     Cefprozil Shortness Of Breath     Oxycodone-Acetaminophen Itching     Penicillins Nausea and Rash     Has had different cephalosporins in past , tolerates Augmentin ok if benadryl is given together (last given 12/2018)  Tolerated Zosyn 6/2019.   Has had different cephalosporins in past       Sulfa Drugs Hives and Other (See Comments)     Other reaction(s): Edema, Other (See Comments)  Swelling in hands and feet    Swelling in hands and feet  Other Reaction(s): Edema  Swelling in hands and feet       Ciprofloxacin Hives     Clonidine Other (See Comments)     Extreme Fatigue  Extreme Fatigue       Codeine Other (See Comments)     \"felt wired\"    \"felt wired\"       Levofloxacin Other (See Comments)     agitation       Azithromycin Nausea and Diarrhea     Cefaclor      Hydrochlorothiazide Nausea     Nitrofurantoin      Oxycodone Itching     Spironolactone Other (See Comments)     Patient can't remember what happens     Zestril [Lisinopril] Other (See Comments)     Patient can not remember what happens     Amoxicillin Hives and Rash     Has had different cephalosporins in past     Aspirin Unknown and Other (See Comments)     Other reaction(s): Other (See Comments)  Contraindication " "with Coumadin Therapy    Contraindication with Coumadin Therapy  Contraindication with Coumadin Therapy         FAMILY HISTORY:  Family History   Problem Relation Age of Onset     Breast Cancer Mother      Hypertension Sister      Alcoholism Brother         and Drug     Cancer Son         pancreatic     Aneurysm Brother      Other - See Comments Brother         renal failure       SOCIAL HISTORY:   Social History     Socioeconomic History     Marital status:    Tobacco Use     Smoking status: Never Smoker     Smokeless tobacco: Never Used   Substance and Sexual Activity     Alcohol use: No     Drug use: No       VITALS:  Patient Vitals for the past 24 hrs:   BP Temp Temp src Pulse Resp SpO2 Height Weight   09/16/22 2131 (!) 148/80 -- -- 96 25 93 % -- --   09/16/22 2100 -- -- -- 88 11 95 % -- --   09/16/22 2045 -- -- -- 87 13 95 % -- --   09/16/22 2030 -- -- -- 92 21 96 % -- --   09/16/22 2015 -- -- -- 89 14 96 % -- --   09/16/22 2000 -- -- -- 92 28 93 % -- --   09/16/22 1945 -- -- -- 85 27 95 % -- --   09/16/22 1930 -- -- -- 88 25 96 % -- --   09/16/22 1915 -- -- -- 90 26 97 % -- --   09/16/22 1900 -- -- -- 91 21 96 % -- --   09/16/22 1845 -- -- -- 88 12 96 % -- --   09/16/22 1832 (!) 182/89 -- -- 91 18 97 % -- --   09/16/22 1830 (!) 182/89 -- -- 88 25 97 % -- --   09/16/22 1800 -- -- -- 93 -- 92 % -- --   09/16/22 1745 -- -- -- 89 -- 94 % -- --   09/16/22 1700 (!) 152/83 98.3  F (36.8  C) Oral 95 20 94 % 1.448 m (4' 9\") 53.1 kg (117 lb)        PHYSICAL EXAM    Constitutional:  Awake, alert, in no apparent distress  HENT:  Normocephalic, Atraumatic. Bilateral external ears normal. Oropharynx moist. Nose normal. Neck- Normal range of motion with no guarding, No midline cervical tenderness, Supple, No stridor.   Eyes:  PERRL, EOMI with no signs of entrapment, Conjunctiva normal, No discharge.   Respiratory:  Normal breath sounds, No respiratory distress, No wheezing.    Cardiovascular:  Normal heart rate, " irregular rhythm, No appreciable rubs or gallops.   GI:  Soft, No tenderness, No distension, No palpable masses  Musculoskeletal:  Intact distal pulses, No edema. Good range of motion in all major joints. No tenderness to palpation or major deformities noted.  Integument:  Warm, Dry, No erythema, No rash.   Neurologic:  Alert & oriented, Normal motor function, Normal sensory function, No focal deficits noted.   Psychiatric:  Affect normal, Judgment normal, Mood normal.     LAB:  All pertinent labs reviewed and interpreted.  Results for orders placed or performed during the hospital encounter of 09/16/22   XR Chest Port 1 View    Impression    IMPRESSION: Increasing cardiomegaly. Increasing prominence of central pulmonary vasculature with mild interstitial edema pattern. Small to moderate right pleural effusion and trace left effusion. No pneumothorax. No acute bony abnormality.   Comprehensive metabolic panel   Result Value Ref Range    Sodium 135 (L) 136 - 145 mmol/L    Potassium 3.3 (L) 3.5 - 5.0 mmol/L    Chloride 93 (L) 98 - 107 mmol/L    Carbon Dioxide (CO2) 31 22 - 31 mmol/L    Anion Gap 11 5 - 18 mmol/L    Urea Nitrogen 17 8 - 28 mg/dL    Creatinine 0.90 0.60 - 1.10 mg/dL    Calcium 8.5 8.5 - 10.5 mg/dL    Glucose 121 70 - 125 mg/dL    Alkaline Phosphatase 92 45 - 120 U/L    AST 25 0 - 40 U/L    ALT 18 0 - 45 U/L    Protein Total 6.7 6.0 - 8.0 g/dL    Albumin 3.6 3.5 - 5.0 g/dL    Bilirubin Total 0.8 0.0 - 1.0 mg/dL    GFR Estimate 59 (L) >60 mL/min/1.73m2   Result Value Ref Range    Lipase 34 0 - 52 U/L   Result Value Ref Range    Troponin I 0.02 0.00 - 0.29 ng/mL   UA with Microscopic reflex to Culture    Specimen: Urine, Clean Catch   Result Value Ref Range    Color Urine Light Yellow Colorless, Straw, Light Yellow, Yellow    Appearance Urine Turbid (A) Clear    Glucose Urine Negative Negative mg/dL    Bilirubin Urine Negative Negative    Ketones Urine Negative Negative mg/dL    Specific Gravity Urine  1.011 1.001 - 1.030    Blood Urine Negative Negative    pH Urine 6.5 5.0 - 7.0    Protein Albumin Urine 10  (A) Negative mg/dL    Urobilinogen Urine <2.0 <2.0 mg/dL    Nitrite Urine Negative Negative    Leukocyte Esterase Urine 25 Martin/uL (A) Negative    Bacteria Urine Many (A) None Seen /HPF    Mucus Urine Present (A) None Seen /LPF    RBC Urine 5 (H) <=2 /HPF    WBC Urine 4 <=5 /HPF    Squamous Epithelials Urine 3 (H) <=1 /HPF    Hyaline Casts Urine 3 (H) <=2 /LPF   Symptomatic; Unknown Influenza A/B & SARS-CoV2 (COVID-19) Virus PCR Multiplex Nasopharyngeal    Specimen: Nasopharyngeal; Swab   Result Value Ref Range    Influenza A PCR Negative Negative    Influenza B PCR Negative Negative    RSV PCR Negative Negative    SARS CoV2 PCR Positive (A) Negative   Result Value Ref Range    INR 6.25 (HH) 0.85 - 1.15   CBC with platelets and differential   Result Value Ref Range    WBC Count 4.2 4.0 - 11.0 10e3/uL    RBC Count 4.32 3.80 - 5.20 10e6/uL    Hemoglobin 12.1 11.7 - 15.7 g/dL    Hematocrit 36.5 35.0 - 47.0 %    MCV 85 78 - 100 fL    MCH 28.0 26.5 - 33.0 pg    MCHC 33.2 31.5 - 36.5 g/dL    RDW 13.1 10.0 - 15.0 %    Platelet Count 170 150 - 450 10e3/uL    % Neutrophils 54 %    % Lymphocytes 28 %    % Monocytes 15 %    % Eosinophils 2 %    % Basophils 0 %    % Immature Granulocytes 1 %    NRBCs per 100 WBC 0 <1 /100    Absolute Neutrophils 2.3 1.6 - 8.3 10e3/uL    Absolute Lymphocytes 1.1 0.8 - 5.3 10e3/uL    Absolute Monocytes 0.6 0.0 - 1.3 10e3/uL    Absolute Eosinophils 0.1 0.0 - 0.7 10e3/uL    Absolute Basophils 0.0 0.0 - 0.2 10e3/uL    Absolute Immature Granulocytes 0.0 <=0.4 10e3/uL    Absolute NRBCs 0.0 10e3/uL   D dimer quantitative   Result Value Ref Range    D-Dimer Quantitative 0.37 0.00 - 0.50 ug/mL FEU   CRP inflammation   Result Value Ref Range    CRP 0.2 0.0 - <0.8 mg/dL       RADIOLOGY:  XR Chest Port 1 View   Final Result   IMPRESSION: Increasing cardiomegaly. Increasing prominence of central  pulmonary vasculature with mild interstitial edema pattern. Small to moderate right pleural effusion and trace left effusion. No pneumothorax. No acute bony abnormality.             EKG:    Atrial fibrillation, no specific ST acute ischemic changes, no concerning dysrhythmias or interval prolongation, when compared to ECG of April 16, 2021, atrial fibrillation has replaced atrial relation with rapid ventricular response  I have independently reviewed and interpreted the EKG(s) documented above.    PROCEDURES:         I, Isha Perez, am serving as a scribe to document services personally performed by Gage Parkinson MD, based on my observation and the provider's statements to me. I, Gage Parkinson MD attest that Isha Perez is acting in a scribe capacity, has observed my performance of the services and has documented them in accordance with my direction.    Gaeg Parkinson M.D.  Emergency Medicine  [facility] EMERGENCY ROOM  [address] [id number] [phone]  Dept: [phone]       Gage Parkinson MD  09/17/22 0053

## 2022-09-16 NOTE — PHARMACY-ADMISSION MEDICATION HISTORY
Pharmacy Note - Admission Medication History    Pertinent Provider Information:    ______________________________________________________________________    Prior To Admission (PTA) med list completed and updated in EMR.       PTA Med List   Medication Sig Note Last Dose     acetaminophen (TYLENOL) 500 MG tablet Take 500 mg by mouth At Bedtime  9/15/2022     atorvastatin (LIPITOR) 20 MG tablet Take 20 mg by mouth daily (with lunch)  9/16/2022     betamethasone dipropionate (DIPROSONE) 0.05 % external cream Apply topically 2 times daily as needed  prn at does not have     bumetanide (BUMEX) 1 MG tablet Take 2 mg by mouth daily before breakfast  9/16/2022     bumetanide (BUMEX) 1 MG tablet Take 1 mg by mouth At Bedtime  9/15/2022     calcium carbonate 500 mg (elemental) (OSCAL 500) 1250 (500 Ca) MG TABS tablet Take 1 tablet by mouth 3 times daily  9/16/2022 at x2     cephALEXin (KEFLEX) 250 MG capsule Take 250 mg by mouth At Bedtime 9/16/2022: UTI prophylaxis 9/15/2022     cholecalciferol 25 MCG (1000 UT) TABS Take 1,000 Units by mouth At Bedtime  9/15/2022     diltiazem ER (DILT-XR) 180 MG 24 hr capsule Take 180 mg by mouth daily  9/16/2022     diphenhydrAMINE (BENADRYL) 25 MG tablet Take 0.5 tablets (12.5 mg) by mouth every 6 hours as needed for itching or allergies  prn     estradiol (ESTRACE) 0.1 MG/GM vaginal cream Place 1 g vaginally as needed  prn at does not have     famotidine (PEPCID) 20 MG tablet Take 20 mg by mouth 2 times daily  9/16/2022 at x1     hydrALAZINE (APRESOLINE) 25 MG tablet Take 25 mg by mouth 2 times daily  9/16/2022 at x1     losartan (COZAAR) 100 MG tablet Take 100 mg by mouth daily  9/16/2022     metoprolol tartrate (LOPRESSOR) 50 MG tablet Take 75 mg by mouth 2 times daily  9/16/2022 at x1     Multiple Vitamins-Minerals (PRESERVISION AREDS 2+MULTI VIT) CAPS Take 1 capsule by mouth daily  9/16/2022     senna-docusate (SENOKOT-S/PERICOLACE) 8.6-50 MG tablet Take 1 tablet by mouth At Bedtime   9/15/2022     tamsulosin (FLOMAX) 0.4 MG capsule Take 1 capsule (0.4 mg) by mouth At Bedtime  9/15/2022     vitamin C (ASCORBIC ACID) 1000 MG TABS Take 1,000 mg by mouth 3 times daily  9/15/2022     warfarin ANTICOAGULANT (COUMADIN) 2.5 MG tablet Take 3.75 mg by mouth At Bedtime  9/15/2022       Information source(s): Family member and CareEverywhere/SureScripts    Method of interview communication: phone    Patient was asked about OTC/herbal products specifically.  PTA med list reflects this.    Based on the pharmacist's assessment, the PTA med list information appears reliable    Allergies were reviewed, assessed, and updated with the patient.      Patient does not anticipate needing any multi-use medications during admission.     Thank you for the opportunity to participate in the care of this patient.      Ana Lopez RPH     9/16/2022     5:55 PM

## 2022-09-16 NOTE — ED TRIAGE NOTES
Patient reports covid + 5 days ago.  Continues to have weakness, diarrhea, nausea, cough and headaches.  Concerns as she has not eaten anything.     EMS reports they had a drop in oxygen with ambulation w/ walker       Triage Assessment     Row Name 09/16/22 1705       Triage Assessment (Adult)    Airway WDL WDL       Respiratory WDL    Respiratory WDL cough       Skin Circulation/Temperature WDL    Skin Circulation/Temperature WDL WDL       Cardiac WDL    Cardiac WDL WDL       Peripheral/Neurovascular WDL    Peripheral Neurovascular WDL WDL       Cognitive/Neuro/Behavioral WDL    Cognitive/Neuro/Behavioral WDL WDL

## 2022-09-16 NOTE — ED NOTES
Patient's daughter and POHubert CARMONA, phone number [phone] would like to be provided with updates.

## 2022-09-17 LAB
ANION GAP SERPL CALCULATED.3IONS-SCNC: 11 MMOL/L (ref 5–18)
ATRIAL RATE - MUSE: 441 BPM
BUN SERPL-MCNC: 18 MG/DL (ref 8–28)
C REACTIVE PROTEIN LHE: 0.2 MG/DL (ref 0–?)
CALCIUM SERPL-MCNC: 8.1 MG/DL (ref 8.5–10.5)
CHLORIDE BLD-SCNC: 97 MMOL/L (ref 98–107)
CO2 SERPL-SCNC: 28 MMOL/L (ref 22–31)
CREAT SERPL-MCNC: 0.86 MG/DL (ref 0.6–1.1)
D DIMER PPP FEU-MCNC: 0.32 UG/ML FEU (ref 0–0.5)
DIASTOLIC BLOOD PRESSURE - MUSE: 83 MMHG
ERYTHROCYTE [DISTWIDTH] IN BLOOD BY AUTOMATED COUNT: 13.1 % (ref 10–15)
GFR SERPL CREATININE-BSD FRML MDRD: 63 ML/MIN/1.73M2
GLUCOSE BLD-MCNC: 168 MG/DL (ref 70–125)
HCT VFR BLD AUTO: 34.2 % (ref 35–47)
HGB BLD-MCNC: 11.4 G/DL (ref 11.7–15.7)
INR PPP: 5.73 (ref 0.85–1.15)
INTERPRETATION ECG - MUSE: NORMAL
MCH RBC QN AUTO: 28.2 PG (ref 26.5–33)
MCHC RBC AUTO-ENTMCNC: 33.3 G/DL (ref 31.5–36.5)
MCV RBC AUTO: 85 FL (ref 78–100)
P AXIS - MUSE: NORMAL DEGREES
PLATELET # BLD AUTO: 153 10E3/UL (ref 150–450)
POTASSIUM BLD-SCNC: 3.9 MMOL/L (ref 3.5–5)
PR INTERVAL - MUSE: NORMAL MS
QRS DURATION - MUSE: 74 MS
QT - MUSE: 392 MS
QTC - MUSE: 490 MS
R AXIS - MUSE: 76 DEGREES
RBC # BLD AUTO: 4.04 10E6/UL (ref 3.8–5.2)
SODIUM SERPL-SCNC: 136 MMOL/L (ref 136–145)
SYSTOLIC BLOOD PRESSURE - MUSE: 152 MMHG
T AXIS - MUSE: 46 DEGREES
TROPONIN I SERPL-MCNC: 0.02 NG/ML (ref 0–0.29)
VENTRICULAR RATE- MUSE: 94 BPM
WBC # BLD AUTO: 3.3 10E3/UL (ref 4–11)

## 2022-09-17 PROCEDURE — 85379 FIBRIN DEGRADATION QUANT: CPT | Performed by: STUDENT IN AN ORGANIZED HEALTH CARE EDUCATION/TRAINING PROGRAM

## 2022-09-17 PROCEDURE — 85027 COMPLETE CBC AUTOMATED: CPT | Performed by: STUDENT IN AN ORGANIZED HEALTH CARE EDUCATION/TRAINING PROGRAM

## 2022-09-17 PROCEDURE — 86140 C-REACTIVE PROTEIN: CPT | Performed by: STUDENT IN AN ORGANIZED HEALTH CARE EDUCATION/TRAINING PROGRAM

## 2022-09-17 PROCEDURE — 80048 BASIC METABOLIC PNL TOTAL CA: CPT | Performed by: STUDENT IN AN ORGANIZED HEALTH CARE EDUCATION/TRAINING PROGRAM

## 2022-09-17 PROCEDURE — 258N000003 HC RX IP 258 OP 636: Performed by: STUDENT IN AN ORGANIZED HEALTH CARE EDUCATION/TRAINING PROGRAM

## 2022-09-17 PROCEDURE — 250N000011 HC RX IP 250 OP 636: Performed by: STUDENT IN AN ORGANIZED HEALTH CARE EDUCATION/TRAINING PROGRAM

## 2022-09-17 PROCEDURE — 99223 1ST HOSP IP/OBS HIGH 75: CPT | Mod: AI | Performed by: STUDENT IN AN ORGANIZED HEALTH CARE EDUCATION/TRAINING PROGRAM

## 2022-09-17 PROCEDURE — 250N000013 HC RX MED GY IP 250 OP 250 PS 637: Performed by: STUDENT IN AN ORGANIZED HEALTH CARE EDUCATION/TRAINING PROGRAM

## 2022-09-17 PROCEDURE — 250N000011 HC RX IP 250 OP 636: Performed by: EMERGENCY MEDICINE

## 2022-09-17 PROCEDURE — 84484 ASSAY OF TROPONIN QUANT: CPT | Performed by: STUDENT IN AN ORGANIZED HEALTH CARE EDUCATION/TRAINING PROGRAM

## 2022-09-17 PROCEDURE — 85610 PROTHROMBIN TIME: CPT | Performed by: STUDENT IN AN ORGANIZED HEALTH CARE EDUCATION/TRAINING PROGRAM

## 2022-09-17 PROCEDURE — 36415 COLL VENOUS BLD VENIPUNCTURE: CPT | Performed by: STUDENT IN AN ORGANIZED HEALTH CARE EDUCATION/TRAINING PROGRAM

## 2022-09-17 PROCEDURE — 96376 TX/PRO/DX INJ SAME DRUG ADON: CPT

## 2022-09-17 PROCEDURE — 120N000001 HC R&B MED SURG/OB

## 2022-09-17 RX ADMIN — DOCUSATE SODIUM AND SENNOSIDES 1 TABLET: 50; 8.6 TABLET ORAL at 20:02

## 2022-09-17 RX ADMIN — METOPROLOL TARTRATE 75 MG: 50 TABLET, FILM COATED ORAL at 09:58

## 2022-09-17 RX ADMIN — BUMETANIDE 2 MG: 0.25 INJECTION, SOLUTION INTRAMUSCULAR; INTRAVENOUS at 10:06

## 2022-09-17 RX ADMIN — CALCIUM 500 MG: 500 TABLET ORAL at 17:25

## 2022-09-17 RX ADMIN — DEXAMETHASONE 6 MG: 2 TABLET ORAL at 14:28

## 2022-09-17 RX ADMIN — METOPROLOL TARTRATE 75 MG: 50 TABLET, FILM COATED ORAL at 20:02

## 2022-09-17 RX ADMIN — REMDESIVIR 100 MG: 100 INJECTION, POWDER, LYOPHILIZED, FOR SOLUTION INTRAVENOUS at 17:26

## 2022-09-17 RX ADMIN — SODIUM CHLORIDE 50 ML: 9 INJECTION, SOLUTION INTRAVENOUS at 01:10

## 2022-09-17 RX ADMIN — DILTIAZEM HYDROCHLORIDE 180 MG: 180 CAPSULE, EXTENDED RELEASE ORAL at 10:00

## 2022-09-17 RX ADMIN — Medication 5 MG: at 20:10

## 2022-09-17 RX ADMIN — CALCIUM 500 MG: 500 TABLET ORAL at 09:59

## 2022-09-17 RX ADMIN — TAMSULOSIN HYDROCHLORIDE 0.4 MG: 0.4 CAPSULE ORAL at 20:03

## 2022-09-17 RX ADMIN — ONDANSETRON 4 MG: 2 INJECTION INTRAMUSCULAR; INTRAVENOUS at 18:09

## 2022-09-17 RX ADMIN — CALCIUM 500 MG: 500 TABLET ORAL at 14:29

## 2022-09-17 RX ADMIN — HYDRALAZINE HYDROCHLORIDE 25 MG: 25 TABLET, FILM COATED ORAL at 20:02

## 2022-09-17 RX ADMIN — HYDRALAZINE HYDROCHLORIDE 25 MG: 25 TABLET, FILM COATED ORAL at 09:59

## 2022-09-17 RX ADMIN — LOSARTAN POTASSIUM 100 MG: 50 TABLET, FILM COATED ORAL at 10:01

## 2022-09-17 RX ADMIN — FAMOTIDINE 20 MG: 20 TABLET ORAL at 09:59

## 2022-09-17 RX ADMIN — BUMETANIDE 2 MG: 0.25 INJECTION, SOLUTION INTRAMUSCULAR; INTRAVENOUS at 20:03

## 2022-09-17 RX ADMIN — ATORVASTATIN CALCIUM 20 MG: 10 TABLET, FILM COATED ORAL at 14:29

## 2022-09-17 RX ADMIN — Medication 5 MG: at 01:43

## 2022-09-17 RX ADMIN — SODIUM CHLORIDE 50 ML: 9 INJECTION, SOLUTION INTRAVENOUS at 17:32

## 2022-09-17 RX ADMIN — CEPHALEXIN 250 MG: 250 CAPSULE ORAL at 20:02

## 2022-09-17 ASSESSMENT — ACTIVITIES OF DAILY LIVING (ADL)
ADLS_ACUITY_SCORE: 32
FALL_HISTORY_WITHIN_LAST_SIX_MONTHS: NO
EQUIPMENT_CURRENTLY_USED_AT_HOME: WALKER, STANDARD
TRANSFERRING: 1-->ASSISTANCE (EQUIPMENT/PERSON) NEEDED (NOT DEVELOPMENTALLY APPROPRIATE)
ADLS_ACUITY_SCORE: 43
WEAR_GLASSES_OR_BLIND: YES
VISION_MANAGEMENT: GLASSES
ADLS_ACUITY_SCORE: 43
DIFFICULTY_EATING/SWALLOWING: NO
CONCENTRATING,_REMEMBERING_OR_MAKING_DECISIONS_DIFFICULTY: NO
ADLS_ACUITY_SCORE: 43
WALKING_OR_CLIMBING_STAIRS: AMBULATION DIFFICULTY, REQUIRES EQUIPMENT
TRANSFERRING: 1-->ASSISTANCE (EQUIPMENT/PERSON) NEEDED
CHANGE_IN_FUNCTIONAL_STATUS_SINCE_ONSET_OF_CURRENT_ILLNESS/INJURY: NO
DOING_ERRANDS_INDEPENDENTLY_DIFFICULTY: YES
WALKING_OR_CLIMBING_STAIRS_DIFFICULTY: YES
ADLS_ACUITY_SCORE: 43
ADLS_ACUITY_SCORE: 43
ADLS_ACUITY_SCORE: 35
ADLS_ACUITY_SCORE: 43
ADLS_ACUITY_SCORE: 32
DRESSING/BATHING_DIFFICULTY: NO
ADLS_ACUITY_SCORE: 32
ADLS_ACUITY_SCORE: 43
ADLS_ACUITY_SCORE: 43
TOILETING_ISSUES: NO

## 2022-09-17 NOTE — H&P
[facility]    History and Physical - Hospitalist Service    Date of Admission:  9/16/2022    Assessment and Plan  Alva Dumont is a 89 year old female admitted on 9/16/2022. She has a history of Afib on coumadin, CHF, Hypertrophic obstructive cardiomyopathy, CVA, HTN, CKD3 and is admitted for covid pneumonia.     Covid Pneumonia  Hypoxia  Patient became symptomatic 5 days PTA. She requires supplemental O2 via NC to maintain O2 sats >92%, she is not on O2 at baseline. CXR notable for pulmonary edema. Labs unremarklabe on admission.   - continue dexamethasone daily x10 days  - start ramdesivir x5 days  - bumex 2 mg BID IV  - supplemental O2 prn, goal SpO2 >92%  - low threshold to start albuterol HFA q4h  - covid labs qAM x4 days  - d-dimer  - telemetry  - anticoagulation, PTA warfarin  - IS    Hypokalemia  3.3 on admission  - replacement protocol    Hyponatremia  135 on admssion  - CMP in AM  - bumex 2 mg BID IV   - takes 2 mg in the qAM, 1 mg at bedtime PTA    Nausea  - compazine prn    Afib  - pharmacy to dose warfarin   - supratherapeutic on admission  - PTA diltiazem, metoprolol  HLD  - PTA statin  Recurrent UTIs  - PTA keflex 250 mg at bedtime  - PTA flomax  HTN  - PTA hydralazine, losartan, metoprolol  Constipation  - PTA senna-docusate  GERD   - PTA famotidine      Diet: Combination Diet Regular Diet Adult  DVT Prophylaxis: PTA Warfarin  Short Catheter: Not present  Fluids: PO  Central Lines: None    Cardiac Monitoring: ACTIVE order. Indication: covid-19 infection  Code Status: No CPR- Do NOT Intubate  POA:  Harper Rowland    Disposition Plan      Expected Discharge Date: 09/18/2022                The patient's care was discussed with the Attending Physician, Dr. Efe Maya who will see the patient in the morning. .    Benita Behm, MD PGY3  [facility]  Senior Pager: [phone], available 24/7  Text page via [facility] Paging/Directory ID# [id number].    ______________________________________________________________________    Chief Complaint  Shortness of Breath    History is obtained from the patient    History of Present Illness  Alva Dumont is a 89 year old female who has a history of Afib on coumadin, CHF, Hypertrophic obstructive cardiomyopathy, CVA, HTN, CKD3 and is presents with shortness of breath in setting of positive home covid-19 testing.     Patient states that she lives with her daughter and son-in-law.  Her son-in-law got COVID.  Patient subsequently developed fevers, cough, shortness of breath, and body aches 5 days ago.  She had a positive at home COVID test.  Today, she reports new onset diarrhea and worsening of her shortness of breath.  She reports her shortness of breath got so significant that she called EMS to take her to the hospital.  She declines headache, sore throat, chest pain, abdominal pain, dysuria, paresthesias.    Patient states she does not have any more swelling that she does at baseline.  She normally wears compression socks at home.  She is not on oxygen at baseline.    Review of Symptoms  The 10 point Review of Systems is negative other than noted in the HPI.    Past Medical History  I have reviewed this patient's medical history and updated it with pertinent information if needed.   Past Medical History:   Diagnosis Date     Adjustment reaction with anxiety and depression      Atrial fibrillation with RVR (H)      Cervical spondylosis with radiculopathy      CHF (congestive heart failure) (H)      Chronic back pain      Chronic kidney disease, stage 3 (H)      CVA (cerebral infarction)     Incidental old small infarcts seen on MRI 7/2004     Frequent UTI 7/31/12    had pseudomonas UTI and required caftazidime IV; cystitis cystica on cystoscopy     HTN (hypertension)      Hypertrophic obstructive cardiomyopathy (H)      Hypochloremia      Hypokalemia      Hyponatremia      Hyponatremia      Insomnia      Left wrist  fracture 2002     Lumbar spinal stenosis      Macular degeneration      Occlusion and stenosis of carotid artery without mention of cerebral infarction 6/11/2008    Right carotid on US-moderate     Osteoporosis      Pure hypercholesterolemia      Sciatica      Senile cataract       Past Surgical History  I have reviewed this patient's surgical history and updated it with pertinent information if needed.  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 Alva Dumont  reports that she has never smoked. She has never used smokeless tobacco. She reports that she does not drink alcohol and does not use drugs.    Family History  Family History   Problem Relation Age of Onset     Breast Cancer Mother      Hypertension Sister      Alcoholism Brother         and Drug     Cancer Son         pancreatic     Aneurysm Brother      Other - See Comments Brother         renal failure     Prior to Admission Medications  Current Outpatient Medications   Medication Instructions     acetaminophen (TYLENOL) 500 mg, Oral, AT BEDTIME     atorvastatin (LIPITOR) 20 mg, Oral, DAILY WITH LUNCH     betamethasone dipropionate (DIPROSONE) 0.05 % external cream Topical, 2 TIMES DAILY PRN     bumetanide (BUMEX) 2 mg, Oral, Daily before breakfast     bumetanide (BUMEX) 1 mg, Oral, AT BEDTIME     calcium carbonate 500 mg (elemental) (OSCAL 500) 1250 (500 Ca) MG TABS tablet 1 tablet, Oral, 3 TIMES DAILY     cephALEXin (KEFLEX) 250 mg, Oral, AT BEDTIME     cholecalciferol 1,000 Units, Oral, AT BEDTIME     diltiazem ER (DILT-XR) 180 mg, Oral, DAILY     diphenhydrAMINE (BENADRYL) 12.5 mg, Oral, EVERY 6 HOURS PRN     estradiol (ESTRACE) 1 g, Vaginal, PRN     famotidine (PEPCID) 20  "mg, Oral, 2 TIMES DAILY     hydrALAZINE (APRESOLINE) 25 mg, Oral, 2 TIMES DAILY     losartan (COZAAR) 100 mg, Oral, DAILY     metoprolol tartrate (LOPRESSOR) 75 mg, Oral, 2 TIMES DAILY     Multiple Vitamins-Minerals (PRESERVISION AREDS 2+MULTI VIT) CAPS 1 capsule, Oral, DAILY     senna-docusate (SENOKOT-S/PERICOLACE) 8.6-50 MG tablet 1 tablet, Oral, AT BEDTIME     tamsulosin (FLOMAX) 0.4 mg, Oral, AT BEDTIME     vitamin C (ASCORBIC ACID) 1,000 mg, Oral, 3 TIMES DAILY     warfarin ANTICOAGULANT (COUMADIN) 3.75 mg, Oral, AT BEDTIME     Allergies  Allergies   Allergen Reactions     Cefprozil Shortness Of Breath     Oxycodone-Acetaminophen Itching     Penicillins Nausea and Rash     Has had different cephalosporins in past , tolerates Augmentin ok if benadryl is given together (last given 12/2018)  Tolerated Zosyn 6/2019.   Has had different cephalosporins in past       Sulfa Drugs Hives and Other (See Comments)     Other reaction(s): Edema, Other (See Comments)  Swelling in hands and feet    Swelling in hands and feet  Other Reaction(s): Edema  Swelling in hands and feet       Ciprofloxacin Hives     Clonidine Other (See Comments)     Extreme Fatigue  Extreme Fatigue       Codeine Other (See Comments)     \"felt wired\"    \"felt wired\"       Levofloxacin Other (See Comments)     agitation       Azithromycin Nausea and Diarrhea     Cefaclor      Hydrochlorothiazide Nausea     Nitrofurantoin      Oxycodone Itching     Spironolactone Other (See Comments)     Patient can't remember what happens     Zestril [Lisinopril] Other (See Comments)     Patient can not remember what happens     Amoxicillin Hives and Rash     Has had different cephalosporins in past     Aspirin Unknown and Other (See Comments)     Other reaction(s): Other (See Comments)  Contraindication with Coumadin Therapy    Contraindication with Coumadin Therapy  Contraindication with Coumadin Therapy         Physical Exam  Vital Signs: Temp: 98.3  F (36.8  C) " Temp src: Oral BP: (!) 148/80 Pulse: 96   Resp: 25 SpO2: 93 % O2 Device: Nasal cannula Oxygen Delivery: 1 LPM  Weight: 117 lbs 0 oz    General appearance: alert, cooperative, appears stated age and no distress, non-toxic appearing  Head: Normocephalic, without obvious abnormality, atraumatic  Eyes: conjunctivae/corneas clear. PERRL, EOM's intact.   Ears: hearing grossly intact  Nose: nares normal, septum midline, mucosa normal, no drainage  Throat: lips, mucosa, and tongue normal; teeth and gums normal  Lungs:  Course breath sounds throughout, no wheezing or fine crackles, wet sounding nonproductive cough, respirations unlabored, nasal cannula in place  Chest wall: no tenderness to palpation  Heart:  Tachycardic, S1, S2 normal, no murmur, click, rub or gallop  Abdomen: soft, non-tender; bowel sounds normal; no masses, no organomegaly  Extremities: extremities normal, atraumatic, no cyanosis, 1+ pedal edema edema bilaterally, symmetric.  Pulses: 2+ and symmetric  Skin: Skin color, texture, turgor normal. No rashes or lesions, non-diaphoretic  Neurologic: CNII-XII intact, normal strength, sensation and reflexes throughout    Data  Data reviewed today: I reviewed all medications, new labs and imaging results over the last 24 hours.     Recent Labs   Lab 09/16/22  1807   WBC 4.2   HGB 12.1   MCV 85      INR 6.25*   *   POTASSIUM 3.3*   CHLORIDE 93*   CO2 31   BUN 17   CR 0.90   ANIONGAP 11   YADY 8.5      ALBUMIN 3.6   PROTTOTAL 6.7   BILITOTAL 0.8   ALKPHOS 92   ALT 18   AST 25   LIPASE 34     Recent Results (from the past 24 hour(s))   XR Chest Port 1 View    Narrative    EXAM: XR CHEST PORT 1 VIEW  LOCATION: [facility]  DATE/TIME: 9/16/2022 7:14 PM    INDICATION: cough  COMPARISON: 05/20/2021      Impression    IMPRESSION: Increasing cardiomegaly. Increasing prominence of central pulmonary vasculature with mild interstitial edema pattern. Small to moderate right pleural  effusion and trace left effusion. No pneumothorax. No acute bony abnormality.

## 2022-09-17 NOTE — PLAN OF CARE
Problem: Plan of Care - These are the overarching goals to be used throughout the patient stay.    Goal: Plan of Care Review/Shift Note  Outcome: Ongoing, Progressing   Goal Outcome Evaluation:      Patient easily forgets, patient is an assist of 1 with generalized weakness. Patient denies pain and has been resting. Patients lung sounds are diminished with inspiratory and expiratory wheezes.

## 2022-09-17 NOTE — PLAN OF CARE
Problem: Fatigue  Goal: Improved Activity Tolerance  Outcome: Ongoing, Progressing  Intervention: Promote Improved Energy  Recent Flowsheet Documentation  Taken 9/17/2022 1800 by Anna Lizama RN  Activity Management: up to bedside commode  Taken 9/17/2022 1725 by Anna Lizama, RN  Activity Management:   activity adjusted per tolerance   activity encouraged     Problem: Gas Exchange Impaired  Goal: Optimal Gas Exchange  Outcome: Ongoing, Progressing  Intervention: Optimize Oxygenation and Ventilation  Recent Flowsheet Documentation  Taken 9/17/2022 1725 by Anna Lizama, RN  Head of Bed (HOB) Positioning: HOB at 20-30 degrees     Pt [facility] to  [facility] today at 1633. Pt is A & O x 4 & denies pain at this time. Pt endorses nausea; Adm PRN IV Zofran w/ effective results. LS diminished w/ wheezes throughout & a frequent, dry cough. CMS intact x 4. Tele running A. Fib. W/ RVR. Saline locked. VSS but requiring 2L O2 NC upon arrival to the unit. Was able to titrate O2 down to 0.5L. Continuing on the K+ protocol which will be a redraw in the AM. Tolerating a regular diet. Up w/ an assist of 1, gait belt w/ a pivot transfer to the commode. Anticipating discharge pending cessation of O2.

## 2022-09-17 NOTE — PROGRESS NOTES
[facility]    Progress Note - Hospitalist Service       Date of Admission:  9/16/2022    Assessment & Plan          Alva Dumont is a 89 year old female admitted on 9/16/2022. She has a history of Afib on coumadin, CHF, Hypertrophic obstructive cardiomyopathy, CVA, HTN, CKD3 and is admitted for covid pneumonia.       # Confirmed COVID-19 infection    # Viral Pneumonia secondary to COVID-19 infection  #Hypoxia  Patient became symptomatic 5 days PTA. She requires supplemental O2 via NC to maintain O2 sats >92%, she is not on O2 at baseline. CXR notable for pulmonary edema. Labs unremarklabe on admission.     Symptom Onset 9/11/22   Date of 1st Positive Test 9/11/22   Vaccination Status Fully Vaccinated       - COVID-19 special precautions, continuous pulse-ox  - Oxygen: continue current support with O2 Device: Nasal cannula at Oxygen Delivery: 2 LPM; titrate to keep SpO2 between 90-96%  - Labs: Daily COVID labs ordered x4 days (CBC, BMP, D-dimer, CRP).  Continue to trend after 4 days as needed.   - Imaging: chest x-ray ordered showing Increasing cardiomegaly. Increasing prominence of central pulmonary vasculature with mild interstitial edema pattern. Small to moderate right pleural effusion and trace left effusion. No pneumothorax. No acute bony abnormality.  - Breathing treatments: no inhalers needed; avoid nebulizers in favor of MDIs    - low threshold to start albuterol HFA q4h  - IV fluids: not indicated at this time  - Antibiotics: not indicated   - COVID-Focused Medications: Dexamethasone 6 mg x 10 days or until hospital discharge, started on 9/16 and Remdesivir x 5 days or until hospital discharge, started on 9/16  - DVT Prophylaxis:         - At high risk of thrombotic complications due to COVID-19 (DDimer = 0.32 ug/mL FEU (Ref range: 0.00 - 0.50 ug/mL FEU) )         - Already on therapeutic anticoagulation with warfarin   -telemetry   -incentive spirometer     Afib  - pharmacy  "to dose warfarin              - supratherapeutic on admission  - PTA diltiazem, metoprolol    Hypokalemia  3.3 on admission  - replacement protocol     Hyponatremia  135 on admssion  - AM BMP  - bumex as above     Nausea  - compazine prn       HLD  - PTA statin  Recurrent UTIs  - PTA keflex 250 mg at bedtime  - PTA flomax  HTN  - PTA hydralazine, losartan, metoprolol  Constipation  - PTA senna-docusate  GERD   - PTA famotidine         Diet: Combination Diet Regular Diet Adult    DVT Prophylaxis: PTA Warfarin  Short Catheter: Not present  Fluids: po  Central Lines: None  Cardiac Monitoring: ACTIVE order. Indication: covid-19 infection  Code Status: No CPR- Do NOT Intubate      Disposition Plan      Expected Discharge Date: 09/18/2022                The patient's care was discussed with the Attending Physician, Dr. Maya, Bedside Nurse and Patient.    Jolly De Jesus DO  Hospitalist Service  [facility]  Securely message with the Vocera Web Console (learn more here)  Text page via [employer] Paging/Directory         Clinically Significant Risk Factors Present on Admission          # Hypocalcemia: Ca = 8.1 mg/dL (Ref range: 8.5 - 10.5 mg/dL) and/or iCa = N/A on admission, will replace as needed      # Coagulation Defect: home medication list includes an anticoagulant medication   # Hypertension: home medication list includes antihypertensive(s)    # Overweight: Estimated body mass index is 25.32 kg/m  as calculated from the following:    Height as of this encounter: 1.448 m (4' 9\").    Weight as of this encounter: 53.1 kg (117 lb).        ______________________________________________________________________    Interval History   Alva states she is feeling much better today.  She continues to deny any chest pain, palpitations, nausea or vomiting, fever or chills, dizziness or vision changes. She reports that her shortness of breath has improved but continues to utilize 2 L supp O2. She had a " bowel movement this morning and denies and dysuria or hematuria.    Data reviewed today: I reviewed all medications, new labs and imaging results over the last 24 hours. I personally reviewed the EKG and chest x-ray image(s).    Physical Exam   Vital Signs: Temp: 97.8  F (36.6  C) Temp src: Oral BP: (!) 163/79 Pulse: 97   Resp: 20 SpO2: 96 % O2 Device: Nasal cannula Oxygen Delivery: 2 LPM  Weight: 117 lbs 0 oz  Constitutional: asleep but easily awoken, alert, cooperative, no apparent distress, and appears stated age  Eyes: Lids and lashes normal, pupils equal, round and reactive to light, extra ocular muscles intact, sclera clear, conjunctiva normal  ENT: normocepalic, without obvious abnormality  Respiratory: Slightly increased work of breathing, fine air exchange,  bibasilar crackles with wheezing throughout  Cardiovascular: Irregularly irregular, A fib on bedside tele monitor, no murmur noted   GI: Normal bowel sounds, soft, non-distended, non-tender, no masses palpated  Skin: normal skin color, texture, turgor  Musculoskeletal: no lower extremity pitting edema present, there is no redness, warmth, or swelling of the joints, tone is normal  Neurologic: Awake, alert, oriented to name, place and time. Cranial nerves II-XII are grossly intact.  Motor is 5 out of 5 bilaterally.   Sensory is intact.   Neuropsychiatric: General: normal, calm and normal eye contact, Affect: normal and pleasant    Data   Recent Labs   Lab 09/17/22  0401 09/16/22  1807   WBC 3.3* 4.2   HGB 11.4* 12.1   MCV 85 85    170   INR 5.73* 6.25*    135*   POTASSIUM 3.9 3.3*   CHLORIDE 97* 93*   CO2 28 31   BUN 18 17   CR 0.86 0.90   ANIONGAP 11 11   YADY 8.1* 8.5   * 121   ALBUMIN  --  3.6   PROTTOTAL  --  6.7   BILITOTAL  --  0.8   ALKPHOS  --  92   ALT  --  18   AST  --  25   LIPASE  --  34     Recent Results (from the past 24 hour(s))   XR Chest Port 1 View    Narrative    EXAM: XR CHEST PORT 1 VIEW  LOCATION: [facility]  DATE/TIME: 9/16/2022 7:14 PM    INDICATION: cough  COMPARISON: 05/20/2021      Impression    IMPRESSION: Increasing cardiomegaly. Increasing prominence of central pulmonary vasculature with mild interstitial edema pattern. Small to moderate right pleural effusion and trace left effusion. No pneumothorax. No acute bony abnormality.     Medications     - MEDICATION INSTRUCTIONS -         remdesivir  100 mg Intravenous Q24H    And     sodium chloride 0.9%  50 mL Intravenous Q24H     atorvastatin  20 mg Oral Daily with lunch     bumetanide  2 mg Intravenous Q12H     calcium carbonate 500 mg (elemental)  500 mg Oral TID w/meals     cephALEXin  250 mg Oral At Bedtime     dexamethasone  6 mg Oral Daily     diltiazem ER  180 mg Oral Daily     famotidine  20 mg Oral Daily     hydrALAZINE  25 mg Oral BID     losartan  100 mg Oral Daily     metoprolol tartrate  75 mg Oral BID     senna-docusate  1 tablet Oral At Bedtime     sodium chloride (PF)  3 mL Intracatheter Q8H     tamsulosin  0.4 mg Oral At Bedtime     Warfarin Therapy Reminder  1 each Oral See Admin Instructions     warfarin-No DOSE today  1 each Does not apply no dose today (warfarin)

## 2022-09-18 LAB
ANION GAP SERPL CALCULATED.3IONS-SCNC: 11 MMOL/L (ref 5–18)
BUN SERPL-MCNC: 26 MG/DL (ref 8–28)
C REACTIVE PROTEIN LHE: 0.2 MG/DL (ref 0–?)
CALCIUM SERPL-MCNC: 8.6 MG/DL (ref 8.5–10.5)
CHLORIDE BLD-SCNC: 94 MMOL/L (ref 98–107)
CO2 SERPL-SCNC: 32 MMOL/L (ref 22–31)
CREAT SERPL-MCNC: 0.86 MG/DL (ref 0.6–1.1)
D DIMER PPP FEU-MCNC: <=0.27 UG/ML FEU (ref 0–0.5)
ERYTHROCYTE [DISTWIDTH] IN BLOOD BY AUTOMATED COUNT: 13.3 % (ref 10–15)
GFR SERPL CREATININE-BSD FRML MDRD: 63 ML/MIN/1.73M2
GLUCOSE BLD-MCNC: 157 MG/DL (ref 70–125)
HCT VFR BLD AUTO: 36.1 % (ref 35–47)
HGB BLD-MCNC: 12 G/DL (ref 11.7–15.7)
INR PPP: 3.58 (ref 0.85–1.15)
MCH RBC QN AUTO: 28.2 PG (ref 26.5–33)
MCHC RBC AUTO-ENTMCNC: 33.2 G/DL (ref 31.5–36.5)
MCV RBC AUTO: 85 FL (ref 78–100)
PLATELET # BLD AUTO: 180 10E3/UL (ref 150–450)
POTASSIUM BLD-SCNC: 3.3 MMOL/L (ref 3.5–5)
POTASSIUM BLD-SCNC: 4.6 MMOL/L (ref 3.5–5)
RBC # BLD AUTO: 4.26 10E6/UL (ref 3.8–5.2)
SODIUM SERPL-SCNC: 137 MMOL/L (ref 136–145)
WBC # BLD AUTO: 5.4 10E3/UL (ref 4–11)

## 2022-09-18 PROCEDURE — 85379 FIBRIN DEGRADATION QUANT: CPT | Performed by: STUDENT IN AN ORGANIZED HEALTH CARE EDUCATION/TRAINING PROGRAM

## 2022-09-18 PROCEDURE — 250N000013 HC RX MED GY IP 250 OP 250 PS 637: Performed by: FAMILY MEDICINE

## 2022-09-18 PROCEDURE — 86140 C-REACTIVE PROTEIN: CPT | Performed by: STUDENT IN AN ORGANIZED HEALTH CARE EDUCATION/TRAINING PROGRAM

## 2022-09-18 PROCEDURE — 36415 COLL VENOUS BLD VENIPUNCTURE: CPT | Performed by: STUDENT IN AN ORGANIZED HEALTH CARE EDUCATION/TRAINING PROGRAM

## 2022-09-18 PROCEDURE — 258N000003 HC RX IP 258 OP 636: Performed by: STUDENT IN AN ORGANIZED HEALTH CARE EDUCATION/TRAINING PROGRAM

## 2022-09-18 PROCEDURE — 120N000001 HC R&B MED SURG/OB

## 2022-09-18 PROCEDURE — 85027 COMPLETE CBC AUTOMATED: CPT | Performed by: STUDENT IN AN ORGANIZED HEALTH CARE EDUCATION/TRAINING PROGRAM

## 2022-09-18 PROCEDURE — 250N000013 HC RX MED GY IP 250 OP 250 PS 637: Performed by: STUDENT IN AN ORGANIZED HEALTH CARE EDUCATION/TRAINING PROGRAM

## 2022-09-18 PROCEDURE — 99233 SBSQ HOSP IP/OBS HIGH 50: CPT | Mod: GC | Performed by: STUDENT IN AN ORGANIZED HEALTH CARE EDUCATION/TRAINING PROGRAM

## 2022-09-18 PROCEDURE — 85610 PROTHROMBIN TIME: CPT | Performed by: STUDENT IN AN ORGANIZED HEALTH CARE EDUCATION/TRAINING PROGRAM

## 2022-09-18 PROCEDURE — 84132 ASSAY OF SERUM POTASSIUM: CPT | Performed by: STUDENT IN AN ORGANIZED HEALTH CARE EDUCATION/TRAINING PROGRAM

## 2022-09-18 PROCEDURE — 84295 ASSAY OF SERUM SODIUM: CPT | Performed by: STUDENT IN AN ORGANIZED HEALTH CARE EDUCATION/TRAINING PROGRAM

## 2022-09-18 PROCEDURE — 250N000011 HC RX IP 250 OP 636: Performed by: STUDENT IN AN ORGANIZED HEALTH CARE EDUCATION/TRAINING PROGRAM

## 2022-09-18 RX ORDER — POTASSIUM CHLORIDE 1.5 G/1.58G
40 POWDER, FOR SOLUTION ORAL ONCE
Status: COMPLETED | OUTPATIENT
Start: 2022-09-18 | End: 2022-09-18

## 2022-09-18 RX ORDER — WARFARIN SODIUM 1 MG/1
1 TABLET ORAL
Status: COMPLETED | OUTPATIENT
Start: 2022-09-18 | End: 2022-09-18

## 2022-09-18 RX ADMIN — WARFARIN SODIUM 1 MG: 1 TABLET ORAL at 18:00

## 2022-09-18 RX ADMIN — METOPROLOL TARTRATE 75 MG: 50 TABLET, FILM COATED ORAL at 07:46

## 2022-09-18 RX ADMIN — DILTIAZEM HYDROCHLORIDE 180 MG: 180 CAPSULE, EXTENDED RELEASE ORAL at 07:45

## 2022-09-18 RX ADMIN — BUMETANIDE 2 MG: 0.25 INJECTION, SOLUTION INTRAMUSCULAR; INTRAVENOUS at 21:37

## 2022-09-18 RX ADMIN — DEXAMETHASONE 6 MG: 2 TABLET ORAL at 12:00

## 2022-09-18 RX ADMIN — ATORVASTATIN CALCIUM 20 MG: 10 TABLET, FILM COATED ORAL at 11:57

## 2022-09-18 RX ADMIN — TAMSULOSIN HYDROCHLORIDE 0.4 MG: 0.4 CAPSULE ORAL at 21:37

## 2022-09-18 RX ADMIN — LOSARTAN POTASSIUM 100 MG: 50 TABLET, FILM COATED ORAL at 07:45

## 2022-09-18 RX ADMIN — CALCIUM 500 MG: 500 TABLET ORAL at 07:45

## 2022-09-18 RX ADMIN — CALCIUM 500 MG: 500 TABLET ORAL at 18:00

## 2022-09-18 RX ADMIN — POTASSIUM CHLORIDE 40 MEQ: 1.5 FOR SOLUTION ORAL at 07:45

## 2022-09-18 RX ADMIN — HYDRALAZINE HYDROCHLORIDE 25 MG: 25 TABLET, FILM COATED ORAL at 07:45

## 2022-09-18 RX ADMIN — SODIUM CHLORIDE 50 ML: 9 INJECTION, SOLUTION INTRAVENOUS at 18:02

## 2022-09-18 RX ADMIN — HYDRALAZINE HYDROCHLORIDE 25 MG: 25 TABLET, FILM COATED ORAL at 21:38

## 2022-09-18 RX ADMIN — Medication 5 MG: at 21:38

## 2022-09-18 RX ADMIN — REMDESIVIR 100 MG: 100 INJECTION, POWDER, LYOPHILIZED, FOR SOLUTION INTRAVENOUS at 18:00

## 2022-09-18 RX ADMIN — FAMOTIDINE 20 MG: 20 TABLET ORAL at 07:45

## 2022-09-18 RX ADMIN — CEPHALEXIN 250 MG: 250 CAPSULE ORAL at 21:37

## 2022-09-18 RX ADMIN — BUMETANIDE 2 MG: 0.25 INJECTION, SOLUTION INTRAMUSCULAR; INTRAVENOUS at 09:54

## 2022-09-18 RX ADMIN — METOPROLOL TARTRATE 75 MG: 50 TABLET, FILM COATED ORAL at 21:38

## 2022-09-18 RX ADMIN — CALCIUM 500 MG: 500 TABLET ORAL at 11:58

## 2022-09-18 RX ADMIN — ACETAMINOPHEN 650 MG: 325 TABLET, FILM COATED ORAL at 07:53

## 2022-09-18 RX ADMIN — DOCUSATE SODIUM AND SENNOSIDES 1 TABLET: 50; 8.6 TABLET ORAL at 21:37

## 2022-09-18 ASSESSMENT — ACTIVITIES OF DAILY LIVING (ADL)
ADLS_ACUITY_SCORE: 32
DEPENDENT_IADLS:: CLEANING;COOKING;LAUNDRY;TRANSPORTATION
ADLS_ACUITY_SCORE: 32

## 2022-09-18 NOTE — PROGRESS NOTES
[facility]    Progress Note - Hospitalist Service       Date of Admission:  9/16/2022    Assessment & Plan          Alva Dumont is a 89 year old female admitted on 9/16/2022. She has a history of Afib on coumadin, CHF, Hypertrophic obstructive cardiomyopathy, CVA, HTN, CKD3 and is admitted for covid pneumonia.       # Confirmed COVID-19 infection    # Viral Pneumonia secondary to COVID-19 infection  #Hypoxia  Patient became symptomatic 5 days PTA. She requires supplemental O2 via NC to maintain O2 sats >92%, she is not on O2 at baseline. CXR notable for pulmonary edema. Labs unremarklabe on admission.     Symptom Onset 9/11/22   Date of 1st Positive Test 9/11/22   Vaccination Status Fully Vaccinated       - COVID-19 special precautions, continuous pulse-ox  - Oxygen: continue current support with O2 Device: Nasal cannula at Oxygen Delivery: 2 LPM; titrate to keep SpO2 between 90-96%  - Labs: Daily COVID labs ordered x4 days (CBC, BMP, D-dimer, CRP).  Continue to trend after 4 days as needed.   - Imaging: chest x-ray ordered showing Increasing cardiomegaly. Increasing prominence of central pulmonary vasculature with mild interstitial edema pattern. Small to moderate right pleural effusion and trace left effusion. No pneumothorax. No acute bony abnormality.  - Breathing treatments: no inhalers needed; avoid nebulizers in favor of MDIs      - IV fluids: not indicated at this time  - Antibiotics: not indicated   - COVID-Focused Medications: Dexamethasone 6 mg x 10 days or until hospital discharge, started on 9/16 and Remdesivir x 5 days or until hospital discharge, started on 9/16  - DVT Prophylaxis:         - At high risk of thrombotic complications due to COVID-19 (DDimer = 0.32 ug/mL FEU (Ref range: 0.00 - 0.50 ug/mL FEU) )         - Already on therapeutic anticoagulation with warfarin   -telemetry   -incentive spirometer     Afib  - pharmacy to dose warfarin              -  supratherapeutic on admission  - PTA diltiazem, metoprolol    Hypokalemia- resolved  3.3 on admission  - replacement protocol     Hyponatremia-resolved  135 on admssion  - AM BMP  - bumex as above     Nausea-resolved  - compazine prn       HLD  - PTA statin  Recurrent UTIs  - PTA keflex 250 mg at bedtime  - PTA flomax  HTN  - PTA hydralazine, losartan, metoprolol  Constipation  - PTA senna-docusate  GERD   - PTA famotidine         Diet: Combination Diet Regular Diet Adult    DVT Prophylaxis: PTA Warfarin  Short Catheter: Not present  Fluids: po  Central Lines: None  Cardiac Monitoring: ACTIVE order. Indication: covid-19 infection  Code Status: No CPR- Do NOT Intubate      Disposition Plan      Expected Discharge Date: 09/19/2022      Destination: home with family          The patient's care was discussed with the Attending Physician, Dr. Francesco Hatch MD  Hospitalist Service  [facility]  Securely message with the Vocera Web Console (learn more here)  Text page via [employer] Paging/Directory         Clinically Significant Risk Factors Present on Admission                      ______________________________________________________________________    Interval History    Patient is stated she is feeling better today.  He slept well throughout the night, no complain.  Patient was frustrated, staff kept coming to her room while she was eating her breakfast, she need time to herself.  Endorses mild shortness of breath.  Denies fever, chills, cough, chest pain, heart palpitation, N/V, or constipation.    Data reviewed today: I reviewed all medications, new labs and imaging results over the last 24 hours. I personally reviewed the EKG and chest x-ray image(s).    Physical Exam   Vital Signs: Temp: 97.4  F (36.3  C) Temp src: Oral BP: 106/52 Pulse: 87   Resp: 18 SpO2: 94 % O2 Device: Nasal cannula Oxygen Delivery: 1/2 LPM  Weight: 113 lbs 4.8 oz  Constitutional: asleep but easily awoken,  alert, cooperative, no apparent distress, and appears stated age  Eyes: Lids and lashes normal, pupils equal, round and reactive to light, extra ocular muscles intact, sclera clear, conjunctiva normal  ENT: normocepalic, without obvious abnormality  Respiratory: Slightly increased work of breathing, fine air exchange,  bibasilar crackles with wheezing throughout  Cardiovascular: Irregularly irregular, A fib on bedside tele monitor, no murmur noted   GI: Normal bowel sounds, soft, non-distended, non-tender, no masses palpated  Skin: normal skin color, texture, turgor  Musculoskeletal: no lower extremity pitting edema present, there is no redness, warmth, or swelling of the joints, tone is normal  Neurologic: Awake, alert, oriented to name, place and time. Cranial nerves II-XII are grossly intact.  Motor is 5 out of 5 bilaterally.   Sensory is intact.   Neuropsychiatric: General: normal, calm and normal eye contact, Affect: normal and pleasant    Data   Recent Labs   Lab 09/18/22  1214 09/18/22  0625 09/17/22  0401 09/16/22  1807   WBC  --  5.4 3.3* 4.2   HGB  --  12.0 11.4* 12.1   MCV  --  85 85 85   PLT  --  180 153 170   INR  --  3.58* 5.73* 6.25*   NA  --  137 136 135*   POTASSIUM 4.6 3.3* 3.9 3.3*   CHLORIDE  --  94* 97* 93*   CO2  --  32* 28 31   BUN  --  26 18 17   CR  --  0.86 0.86 0.90   ANIONGAP  --  11 11 11   YADY  --  8.6 8.1* 8.5   GLC  --  157* 168* 121   ALBUMIN  --   --   --  3.6   PROTTOTAL  --   --   --  6.7   BILITOTAL  --   --   --  0.8   ALKPHOS  --   --   --  92   ALT  --   --   --  18   AST  --   --   --  25   LIPASE  --   --   --  34     No results found for this or any previous visit (from the past 24 hour(s)).  Medications     - MEDICATION INSTRUCTIONS -         remdesivir  100 mg Intravenous Q24H    And     sodium chloride 0.9%  50 mL Intravenous Q24H     atorvastatin  20 mg Oral Daily with lunch     bumetanide  2 mg Intravenous Q12H     calcium carbonate 500 mg (elemental)  500 mg Oral TID  w/meals     cephALEXin  250 mg Oral At Bedtime     dexamethasone  6 mg Oral Daily     diltiazem ER  180 mg Oral Daily     famotidine  20 mg Oral Daily     hydrALAZINE  25 mg Oral BID     losartan  100 mg Oral Daily     metoprolol tartrate  75 mg Oral BID     senna-docusate  1 tablet Oral At Bedtime     sodium chloride (PF)  3 mL Intracatheter Q8H     tamsulosin  0.4 mg Oral At Bedtime     warfarin ANTICOAGULANT  1 mg Oral ONCE at 18:00     Warfarin Therapy Reminder  1 each Oral See Admin Instructions

## 2022-09-18 NOTE — CONSULTS
Care Management Initial Consult    General Information  Assessment completed with: Patient,    Type of CM/SW Visit: Initial Assessment    Primary Care Provider verified and updated as needed: Yes   Readmission within the last 30 days:        Reason for Consult: discharge planning  Advance Care Planning: Advance Care Planning Reviewed: present on chart          Communication Assessment  Patient's communication style: spoken language (English or Bilingual)    Hearing Difficulty or Deaf: no   Wear Glasses or Blind: yes    Cognitive  Cognitive/Neuro/Behavioral: WDL                      Living Environment:   People in home: child(haritha), adult, grandchild(haritha), other relative(s)     Current living Arrangements: house      Able to return to prior arrangements: yes       Family/Social Support:  Care provided by: self, child(haritha)  Provides care for: no one     Children          Description of Support System: Supportive, Involved         Current Resources:   Patient receiving home care services: No     Community Resources: Other (see comment) (Food stamps)  Equipment currently used at home: walker, rolling  Supplies currently used at home: None    Employment/Financial:  Employment Status:          Financial Concerns:             Lifestyle & Psychosocial Needs:  Social Determinants of Health     Tobacco Use: Low Risk      Smoking Tobacco Use: Never Smoker     Smokeless Tobacco Use: Never Used   Alcohol Use: Not on file   Financial Resource Strain: Not on file   Food Insecurity: Not on file   Transportation Needs: Not on file   Physical Activity: Not on file   Stress: Not on file   Social Connections: Not on file   Intimate Partner Violence: Not on file   Depression: Not on file   Housing Stability: Not on file       Functional Status:  Prior to admission patient needed assistance:   Dependent ADLs:: Ambulation-walker  Dependent IADLs:: Cleaning, Cooking, Laundry, Transportation       Mental Health Status:          Chemical  Dependency Status:                Values/Beliefs:  Spiritual, Cultural Beliefs, Druze Practices, Values that affect care:                 Additional Information:  2:15 PM  COVID +. Assessed.  Lives in a house with daughter and son-in-law.  Gets assistance with cooking, cleaning, laundry.  Uses a walker.  Daughter Hubert is [facility] [phone].  Family will transport at discharge.      MARBELLA Steele

## 2022-09-18 NOTE — PLAN OF CARE
Problem: Plan of Care - These are the overarching goals to be used throughout the patient stay.    Goal: Optimal Comfort and Wellbeing  Outcome: Ongoing, Progressing   Goal Outcome Evaluation:        Pt. Will rest tonight with minimal rest and sleep interruptions.

## 2022-09-18 NOTE — PLAN OF CARE
Problem: Gas Exchange Impaired  Goal: Optimal Gas Exchange  Outcome: Ongoing, Progressing  Intervention: Optimize Oxygenation and Ventilation  Recent Flowsheet Documentation  Taken 9/18/2022 1635 by Anna Lizama RN  Head of Bed ([facility]) Positioning: HOB at 20-30 degrees  Taken 9/18/2022 1145 by Anna Lizama RN  Head of Bed ([facility]) Positioning: HOB at 20-30 degrees  Taken 9/18/2022 0745 by Anna Lizama RN  Head of Bed ([facility]) Positioning: HOB at 30-45 degrees     Pt is A & O x 4. Pt endorsed a headache this AM which was effectively treated w/ PRN APAP. LS diminished w/ wheezes throughout & a frequent, dry cough. CMS intact x 4. Tele running A. Fib. Saline locked. VSS & was able to wean off O2; Saturating 92%-93% on RA. Continuing on the K+ protocol which was replaced & will now be a redraw in the AM. Tolerating a regular diet. Up w/ an assist of 1, gait belt w/ a pivot transfer to the commode. Anticipating discharge likely tomorrow.

## 2022-09-19 LAB
ANION GAP SERPL CALCULATED.3IONS-SCNC: 11 MMOL/L (ref 5–18)
BUN SERPL-MCNC: 41 MG/DL (ref 8–28)
C REACTIVE PROTEIN LHE: 0.1 MG/DL (ref 0–?)
CALCIUM SERPL-MCNC: 9 MG/DL (ref 8.5–10.5)
CHLORIDE BLD-SCNC: 95 MMOL/L (ref 98–107)
CO2 SERPL-SCNC: 31 MMOL/L (ref 22–31)
CREAT SERPL-MCNC: 1 MG/DL (ref 0.6–1.1)
D DIMER PPP FEU-MCNC: 0.28 UG/ML FEU (ref 0–0.5)
ERYTHROCYTE [DISTWIDTH] IN BLOOD BY AUTOMATED COUNT: 13.3 % (ref 10–15)
GFR SERPL CREATININE-BSD FRML MDRD: 52 ML/MIN/1.73M2
GLUCOSE BLD-MCNC: 182 MG/DL (ref 70–125)
HCT VFR BLD AUTO: 37.7 % (ref 35–47)
HGB BLD-MCNC: 12.5 G/DL (ref 11.7–15.7)
HOLD SPECIMEN: NORMAL
INR PPP: 2.82 (ref 0.85–1.15)
MCH RBC QN AUTO: 28 PG (ref 26.5–33)
MCHC RBC AUTO-ENTMCNC: 33.2 G/DL (ref 31.5–36.5)
MCV RBC AUTO: 84 FL (ref 78–100)
PLATELET # BLD AUTO: 199 10E3/UL (ref 150–450)
POTASSIUM BLD-SCNC: 3.6 MMOL/L (ref 3.5–5)
RBC # BLD AUTO: 4.47 10E6/UL (ref 3.8–5.2)
SODIUM SERPL-SCNC: 137 MMOL/L (ref 136–145)
TROPONIN I SERPL-MCNC: 0.02 NG/ML (ref 0–0.29)
WBC # BLD AUTO: 7.3 10E3/UL (ref 4–11)

## 2022-09-19 PROCEDURE — 99232 SBSQ HOSP IP/OBS MODERATE 35: CPT | Mod: GC | Performed by: STUDENT IN AN ORGANIZED HEALTH CARE EDUCATION/TRAINING PROGRAM

## 2022-09-19 PROCEDURE — 85027 COMPLETE CBC AUTOMATED: CPT | Performed by: STUDENT IN AN ORGANIZED HEALTH CARE EDUCATION/TRAINING PROGRAM

## 2022-09-19 PROCEDURE — 250N000013 HC RX MED GY IP 250 OP 250 PS 637: Performed by: STUDENT IN AN ORGANIZED HEALTH CARE EDUCATION/TRAINING PROGRAM

## 2022-09-19 PROCEDURE — 86140 C-REACTIVE PROTEIN: CPT | Performed by: STUDENT IN AN ORGANIZED HEALTH CARE EDUCATION/TRAINING PROGRAM

## 2022-09-19 PROCEDURE — 82310 ASSAY OF CALCIUM: CPT | Performed by: STUDENT IN AN ORGANIZED HEALTH CARE EDUCATION/TRAINING PROGRAM

## 2022-09-19 PROCEDURE — 84484 ASSAY OF TROPONIN QUANT: CPT | Performed by: STUDENT IN AN ORGANIZED HEALTH CARE EDUCATION/TRAINING PROGRAM

## 2022-09-19 PROCEDURE — 250N000011 HC RX IP 250 OP 636: Performed by: STUDENT IN AN ORGANIZED HEALTH CARE EDUCATION/TRAINING PROGRAM

## 2022-09-19 PROCEDURE — 85610 PROTHROMBIN TIME: CPT | Performed by: STUDENT IN AN ORGANIZED HEALTH CARE EDUCATION/TRAINING PROGRAM

## 2022-09-19 PROCEDURE — 85379 FIBRIN DEGRADATION QUANT: CPT | Performed by: STUDENT IN AN ORGANIZED HEALTH CARE EDUCATION/TRAINING PROGRAM

## 2022-09-19 PROCEDURE — 258N000003 HC RX IP 258 OP 636: Performed by: STUDENT IN AN ORGANIZED HEALTH CARE EDUCATION/TRAINING PROGRAM

## 2022-09-19 PROCEDURE — 36415 COLL VENOUS BLD VENIPUNCTURE: CPT | Performed by: STUDENT IN AN ORGANIZED HEALTH CARE EDUCATION/TRAINING PROGRAM

## 2022-09-19 PROCEDURE — 250N000013 HC RX MED GY IP 250 OP 250 PS 637: Performed by: FAMILY MEDICINE

## 2022-09-19 PROCEDURE — 120N000001 HC R&B MED SURG/OB

## 2022-09-19 RX ORDER — BUMETANIDE 1 MG/1
1 TABLET ORAL AT BEDTIME
Status: DISCONTINUED | OUTPATIENT
Start: 2022-09-19 | End: 2022-09-22 | Stop reason: HOSPADM

## 2022-09-19 RX ORDER — BENZONATATE 100 MG/1
100 CAPSULE ORAL 3 TIMES DAILY PRN
Status: DISCONTINUED | OUTPATIENT
Start: 2022-09-19 | End: 2022-09-22 | Stop reason: HOSPADM

## 2022-09-19 RX ORDER — GUAIFENESIN 600 MG/1
600 TABLET, EXTENDED RELEASE ORAL 2 TIMES DAILY
Status: DISCONTINUED | OUTPATIENT
Start: 2022-09-19 | End: 2022-09-22 | Stop reason: HOSPADM

## 2022-09-19 RX ORDER — BUMETANIDE 2 MG/1
2 TABLET ORAL
Status: DISCONTINUED | OUTPATIENT
Start: 2022-09-20 | End: 2022-09-22 | Stop reason: HOSPADM

## 2022-09-19 RX ADMIN — DOCUSATE SODIUM AND SENNOSIDES 1 TABLET: 50; 8.6 TABLET ORAL at 21:27

## 2022-09-19 RX ADMIN — BUMETANIDE 2 MG: 0.25 INJECTION, SOLUTION INTRAMUSCULAR; INTRAVENOUS at 09:31

## 2022-09-19 RX ADMIN — REMDESIVIR 100 MG: 100 INJECTION, POWDER, LYOPHILIZED, FOR SOLUTION INTRAVENOUS at 18:38

## 2022-09-19 RX ADMIN — FAMOTIDINE 20 MG: 20 TABLET ORAL at 09:21

## 2022-09-19 RX ADMIN — METOPROLOL TARTRATE 75 MG: 50 TABLET, FILM COATED ORAL at 09:20

## 2022-09-19 RX ADMIN — LOSARTAN POTASSIUM 100 MG: 50 TABLET, FILM COATED ORAL at 09:21

## 2022-09-19 RX ADMIN — TAMSULOSIN HYDROCHLORIDE 0.4 MG: 0.4 CAPSULE ORAL at 21:27

## 2022-09-19 RX ADMIN — METOPROLOL TARTRATE 75 MG: 50 TABLET, FILM COATED ORAL at 21:27

## 2022-09-19 RX ADMIN — SODIUM CHLORIDE 50 ML: 9 INJECTION, SOLUTION INTRAVENOUS at 18:38

## 2022-09-19 RX ADMIN — ATORVASTATIN CALCIUM 20 MG: 10 TABLET, FILM COATED ORAL at 13:09

## 2022-09-19 RX ADMIN — CALCIUM 500 MG: 500 TABLET ORAL at 18:37

## 2022-09-19 RX ADMIN — HYDRALAZINE HYDROCHLORIDE 25 MG: 25 TABLET, FILM COATED ORAL at 21:27

## 2022-09-19 RX ADMIN — Medication 5 MG: at 21:35

## 2022-09-19 RX ADMIN — CEPHALEXIN 250 MG: 250 CAPSULE ORAL at 21:26

## 2022-09-19 RX ADMIN — DEXAMETHASONE 6 MG: 2 TABLET ORAL at 13:09

## 2022-09-19 RX ADMIN — CALCIUM 500 MG: 500 TABLET ORAL at 09:20

## 2022-09-19 RX ADMIN — BUMETANIDE 1 MG: 1 TABLET ORAL at 21:26

## 2022-09-19 RX ADMIN — HYDRALAZINE HYDROCHLORIDE 25 MG: 25 TABLET, FILM COATED ORAL at 09:21

## 2022-09-19 RX ADMIN — WARFARIN SODIUM 3.75 MG: 2.5 TABLET ORAL at 18:38

## 2022-09-19 RX ADMIN — DILTIAZEM HYDROCHLORIDE 180 MG: 180 CAPSULE, EXTENDED RELEASE ORAL at 09:21

## 2022-09-19 RX ADMIN — CALCIUM 500 MG: 500 TABLET ORAL at 13:09

## 2022-09-19 RX ADMIN — GUAIFENESIN 600 MG: 600 TABLET, EXTENDED RELEASE ORAL at 21:26

## 2022-09-19 ASSESSMENT — ACTIVITIES OF DAILY LIVING (ADL)
ADLS_ACUITY_SCORE: 32

## 2022-09-19 NOTE — PROGRESS NOTES
[facility]    Progress Note - Hospitalist Service       Date of Admission:  9/16/2022    Assessment & Plan          Alva Dumont is a 89 year old female admitted on 9/16/2022. She has a history of Afib on coumadin, CHF, Hypertrophic obstructive cardiomyopathy, CVA, HTN, CKD3 and is admitted for covid pneumonia.     # Confirmed COVID-19 infection    # Viral Pneumonia secondary to COVID-19 infection  #Hypoxia  Patient became symptomatic 5 days PTA. O2 needs have improved, but patient feels her breathing is better on 1/2 LPM by NC. She is not on O2 at baseline. CXR notable for pulmonary edema. Labs unremarklabe on admission. Now having cough    Symptom Onset 9/11/22   Date of 1st Positive Test 9/11/22   Vaccination Status Fully Vaccinated       - COVID-19 special precautions, continuous pulse-ox  - Oxygen: continue current support with O2 Device: Nasal cannula at Oxygen Delivery: titrate to keep SpO2 between 90-96% or 1/2 LPM for comfort  - Labs: Daily COVID labs ordered x4 days (CBC, BMP, D-dimer, CRP).  Continue to trend after 4 days as needed.   - Imaging: chest x-ray ordered showing Increasing cardiomegaly. Increasing prominence of central pulmonary vasculature with mild interstitial edema pattern. Small to moderate right pleural effusion and trace left effusion. No pneumothorax. No acute bony abnormality.  - Breathing treatments: no inhalers needed; avoid nebulizers in favor of MDIs      - IV fluids: not indicated at this time  - Antibiotics: not indicated   - COVID-Focused Medications: Dexamethasone 6 mg x 10 days or until hospital discharge, started on 9/16 and Remdesivir x 5 days or until hospital discharge, started on 9/16  - DVT Prophylaxis:         - At high risk of thrombotic complications due to COVID-19 (DDimer = 0.32 ug/mL FEU (Ref range: 0.00 - 0.50 ug/mL FEU) )         - Already on therapeutic anticoagulation with warfarin   -telemetry   -incentive spirometer   -tessalon,  "mucinex as needed for cough and mucus     Afib  - pharmacy to dose warfarin              - supratherapeutic on admission  - PTA diltiazem, metoprolol    Hypokalemia- resolved  3.3 on admission  - replacement protocol     Hyponatremia-resolved  135 on admssion  - AM BMP  - PTA bumex 2 mg AM, 1 mg at bedtime PO     Nausea-resolved  - compazine prn       HLD  - PTA statin  Recurrent UTIs  - PTA keflex 250 mg at bedtime  - PTA flomax  HTN  - PTA hydralazine, losartan, metoprolol  Constipation  - PTA senna-docusate  GERD   - PTA famotidine         Diet: Combination Diet Regular Diet Adult    DVT Prophylaxis: PTA Warfarin  Short Catheter: Not present  Fluids: po  Central Lines: None  Cardiac Monitoring: ACTIVE order. Indication: Hx of A fib, + COVID  Code Status: No CPR- Do NOT Intubate      Disposition Plan      Expected Discharge Date: 09/20/2022,  3:00 PM  Discharge Delays: Oxygen Needs - Arrange Home O2  Destination: home with family  Discharge Comments: cough is worse today. Still on O2.        The patient's care was discussed with the Attending Physician, Dr. Francesco Conklin MD  Hospitalist Service  [facility]  Securely message with the Vocera Web Console (learn more here)  Text page via [employer] Paging/Directory         Clinically Significant Risk Factors Present on Admission                      ______________________________________________________________________    Interval History    Feeling tired today, \"up all night coughing.\" Denies shortness of breath, but does feel better with O2 in place running at 1/2 LPM.  Denies fever, chills, chest pain, heart palpitation, N/V, or constipation.    Data reviewed today: I reviewed all medications, new labs and imaging results over the last 24 hours. I personally reviewed the EKG and chest x-ray image(s).    Physical Exam   Vital Signs: Temp: 98.2  F (36.8  C) Temp src: Oral BP: 136/70 Pulse: 96   Resp: 18 SpO2: 94 % O2 Device: Nasal " cannula Oxygen Delivery: 1/2 LPM  Weight: 113 lbs 1.6 oz  Constitutional: alert, cooperative, no apparent distress, and appears stated age  Eyes: Lids and lashes normal, pupils equal, round and reactive to light, extra ocular muscles intact, sclera clear, conjunctiva normal  ENT: normocepalic, without obvious abnormality  Respiratory: normal work of breathing, lungs clear to auscultation bilaterally  Cardiovascular: Irregularly irregular, A fib on bedside tele monitor, no murmur noted   GI: Normal bowel sounds, soft, non-distended, non-tender, no masses palpated  Skin: normal skin color, texture, turgor  Musculoskeletal: no lower extremity pitting edema present, there is no redness, warmth, or swelling of the joints, tone is normal  Neurologic: Awake, alert, oriented to name, place and time. Cranial nerves II-XII are grossly intact.  Motor is 5 out of 5 bilaterally.   Sensory is intact.   Neuropsychiatric: General: normal, calm and normal eye contact, Affect: normal and pleasant    Data   Recent Labs   Lab 09/19/22  0701 09/18/22  1214 09/18/22  0625 09/17/22  0401 09/16/22  1807   WBC 7.3  --  5.4 3.3* 4.2   HGB 12.5  --  12.0 11.4* 12.1   MCV 84  --  85 85 85     --  180 153 170   INR 2.82*  --  3.58* 5.73* 6.25*     --  137 136 135*   POTASSIUM 3.6 4.6 3.3* 3.9 3.3*   CHLORIDE 95*  --  94* 97* 93*   CO2 31  --  32* 28 31   BUN 41*  --  26 18 17   CR 1.00  --  0.86 0.86 0.90   ANIONGAP 11  --  11 11 11   YADY 9.0  --  8.6 8.1* 8.5   *  --  157* 168* 121   ALBUMIN  --   --   --   --  3.6   PROTTOTAL  --   --   --   --  6.7   BILITOTAL  --   --   --   --  0.8   ALKPHOS  --   --   --   --  92   ALT  --   --   --   --  18   AST  --   --   --   --  25   LIPASE  --   --   --   --  34     No results found for this or any previous visit (from the past 24 hour(s)).  Medications     - MEDICATION INSTRUCTIONS -         remdesivir  100 mg Intravenous Q24H    And     sodium chloride 0.9%  50 mL Intravenous  Q24H     atorvastatin  20 mg Oral Daily with lunch     bumetanide  1 mg Oral At Bedtime     [START ON 9/20/2022] bumetanide  2 mg Oral QAM AC     calcium carbonate 500 mg (elemental)  500 mg Oral TID w/meals     cephALEXin  250 mg Oral At Bedtime     [START ON 9/20/2022] dexamethasone  6 mg Oral QAM     diltiazem ER  180 mg Oral Daily     famotidine  20 mg Oral Daily     hydrALAZINE  25 mg Oral BID     losartan  100 mg Oral Daily     metoprolol tartrate  75 mg Oral BID     senna-docusate  1 tablet Oral At Bedtime     sodium chloride (PF)  3 mL Intracatheter Q8H     tamsulosin  0.4 mg Oral At Bedtime     warfarin ANTICOAGULANT  3.75 mg Oral ONCE at 18:00     Warfarin Therapy Reminder  1 each Oral See Admin Instructions

## 2022-09-19 NOTE — PLAN OF CARE
Problem: Plan of Care - These are the overarching goals to be used throughout the patient stay.    Goal: Plan of Care Review/Shift Note  Outcome: Ongoing, Progressing   Goal Outcome Evaluation:      Patient is alert and oriented, patient is an assist of 1, patient denies pain and has been resting comfortably. Patients lung sounds are shallow, and pt has expiratory wheezes.

## 2022-09-19 NOTE — PLAN OF CARE
Problem: Plan of Care - These are the overarching goals to be used throughout the patient stay.    Goal: Plan of Care Review/Shift Note  9/19/2022 0102 by Elsa Meyer RN  Outcome: Ongoing, Progressing  9/18/2022 2241 by Elsa Meyer RN  Outcome: Ongoing, Progressing   Goal Outcome Evaluation:        Patient is alert and oriented, patient is an assist of 1, patient denies pain and has been resting comfortably. Patients lung sounds are diminished with expiratory wheezes. Tele is afib.

## 2022-09-19 NOTE — PLAN OF CARE
Note from [id number]. Pt denied pain during shift thus far. No new skin issues noted. Full sensation per pt. SBA to assist of 1 for transferring. Saline locked. Voiding adequately with the purewick. Education on medication administration and use of call-light to reduce risk for falls and injury. Alarms in place. VSS, 0.5L of O2 utilized. SOB noted with exertion. No further issues noted. Will continue to monitor.    Taylor R Schoenecker, RN

## 2022-09-20 LAB
ANION GAP SERPL CALCULATED.3IONS-SCNC: 17 MMOL/L (ref 5–18)
BUN SERPL-MCNC: 41 MG/DL (ref 8–28)
C REACTIVE PROTEIN LHE: 0.1 MG/DL (ref 0–?)
CALCIUM SERPL-MCNC: 8.7 MG/DL (ref 8.5–10.5)
CHLORIDE BLD-SCNC: 92 MMOL/L (ref 98–107)
CO2 SERPL-SCNC: 27 MMOL/L (ref 22–31)
CREAT SERPL-MCNC: 1.1 MG/DL (ref 0.6–1.1)
D DIMER PPP FEU-MCNC: <=0.27 UG/ML FEU (ref 0–0.5)
ERYTHROCYTE [DISTWIDTH] IN BLOOD BY AUTOMATED COUNT: 13.3 % (ref 10–15)
ERYTHROCYTE [DISTWIDTH] IN BLOOD BY AUTOMATED COUNT: 13.3 % (ref 10–15)
GFR SERPL CREATININE-BSD FRML MDRD: 47 ML/MIN/1.73M2
GLUCOSE BLD-MCNC: 336 MG/DL (ref 70–125)
GLUCOSE BLDC GLUCOMTR-MCNC: 181 MG/DL (ref 70–99)
GLUCOSE BLDC GLUCOMTR-MCNC: 358 MG/DL (ref 70–99)
HCT VFR BLD AUTO: 38.1 % (ref 35–47)
HCT VFR BLD AUTO: 39.7 % (ref 35–47)
HGB BLD-MCNC: 12.7 G/DL (ref 11.7–15.7)
HGB BLD-MCNC: 13.1 G/DL (ref 11.7–15.7)
HOLD SPECIMEN: NORMAL
INR PPP: 2.44 (ref 0.85–1.15)
MCH RBC QN AUTO: 28 PG (ref 26.5–33)
MCH RBC QN AUTO: 28.1 PG (ref 26.5–33)
MCHC RBC AUTO-ENTMCNC: 33 G/DL (ref 31.5–36.5)
MCHC RBC AUTO-ENTMCNC: 33.3 G/DL (ref 31.5–36.5)
MCV RBC AUTO: 84 FL (ref 78–100)
MCV RBC AUTO: 85 FL (ref 78–100)
PLATELET # BLD AUTO: 176 10E3/UL (ref 150–450)
PLATELET # BLD AUTO: 229 10E3/UL (ref 150–450)
POTASSIUM BLD-SCNC: 3.9 MMOL/L (ref 3.5–5)
RBC # BLD AUTO: 4.53 10E6/UL (ref 3.8–5.2)
RBC # BLD AUTO: 4.67 10E6/UL (ref 3.8–5.2)
SODIUM SERPL-SCNC: 136 MMOL/L (ref 136–145)
WBC # BLD AUTO: 10.1 10E3/UL (ref 4–11)
WBC # BLD AUTO: 8.3 10E3/UL (ref 4–11)

## 2022-09-20 PROCEDURE — 36415 COLL VENOUS BLD VENIPUNCTURE: CPT | Performed by: STUDENT IN AN ORGANIZED HEALTH CARE EDUCATION/TRAINING PROGRAM

## 2022-09-20 PROCEDURE — 250N000013 HC RX MED GY IP 250 OP 250 PS 637: Performed by: STUDENT IN AN ORGANIZED HEALTH CARE EDUCATION/TRAINING PROGRAM

## 2022-09-20 PROCEDURE — 250N000011 HC RX IP 250 OP 636: Performed by: STUDENT IN AN ORGANIZED HEALTH CARE EDUCATION/TRAINING PROGRAM

## 2022-09-20 PROCEDURE — 80048 BASIC METABOLIC PNL TOTAL CA: CPT | Performed by: STUDENT IN AN ORGANIZED HEALTH CARE EDUCATION/TRAINING PROGRAM

## 2022-09-20 PROCEDURE — 85610 PROTHROMBIN TIME: CPT | Performed by: STUDENT IN AN ORGANIZED HEALTH CARE EDUCATION/TRAINING PROGRAM

## 2022-09-20 PROCEDURE — 86140 C-REACTIVE PROTEIN: CPT | Performed by: STUDENT IN AN ORGANIZED HEALTH CARE EDUCATION/TRAINING PROGRAM

## 2022-09-20 PROCEDURE — 85014 HEMATOCRIT: CPT | Performed by: STUDENT IN AN ORGANIZED HEALTH CARE EDUCATION/TRAINING PROGRAM

## 2022-09-20 PROCEDURE — 250N000013 HC RX MED GY IP 250 OP 250 PS 637: Performed by: FAMILY MEDICINE

## 2022-09-20 PROCEDURE — 258N000003 HC RX IP 258 OP 636: Performed by: STUDENT IN AN ORGANIZED HEALTH CARE EDUCATION/TRAINING PROGRAM

## 2022-09-20 PROCEDURE — 85379 FIBRIN DEGRADATION QUANT: CPT | Performed by: STUDENT IN AN ORGANIZED HEALTH CARE EDUCATION/TRAINING PROGRAM

## 2022-09-20 PROCEDURE — 99232 SBSQ HOSP IP/OBS MODERATE 35: CPT | Mod: GC | Performed by: STUDENT IN AN ORGANIZED HEALTH CARE EDUCATION/TRAINING PROGRAM

## 2022-09-20 PROCEDURE — 120N000001 HC R&B MED SURG/OB

## 2022-09-20 RX ORDER — FAMOTIDINE 20 MG/1
20 TABLET, FILM COATED ORAL EVERY OTHER DAY
Status: DISCONTINUED | OUTPATIENT
Start: 2022-09-22 | End: 2022-09-22 | Stop reason: HOSPADM

## 2022-09-20 RX ADMIN — METOPROLOL TARTRATE 75 MG: 50 TABLET, FILM COATED ORAL at 10:22

## 2022-09-20 RX ADMIN — BUMETANIDE 1 MG: 1 TABLET ORAL at 20:26

## 2022-09-20 RX ADMIN — CEPHALEXIN 250 MG: 250 CAPSULE ORAL at 22:21

## 2022-09-20 RX ADMIN — ATORVASTATIN CALCIUM 20 MG: 10 TABLET, FILM COATED ORAL at 11:56

## 2022-09-20 RX ADMIN — GUAIFENESIN 600 MG: 600 TABLET, EXTENDED RELEASE ORAL at 20:27

## 2022-09-20 RX ADMIN — CALCIUM 500 MG: 500 TABLET ORAL at 11:56

## 2022-09-20 RX ADMIN — METOPROLOL TARTRATE 75 MG: 50 TABLET, FILM COATED ORAL at 20:27

## 2022-09-20 RX ADMIN — CALCIUM 500 MG: 500 TABLET ORAL at 18:44

## 2022-09-20 RX ADMIN — FAMOTIDINE 20 MG: 20 TABLET ORAL at 10:23

## 2022-09-20 RX ADMIN — GUAIFENESIN 600 MG: 600 TABLET, EXTENDED RELEASE ORAL at 10:25

## 2022-09-20 RX ADMIN — TAMSULOSIN HYDROCHLORIDE 0.4 MG: 0.4 CAPSULE ORAL at 22:21

## 2022-09-20 RX ADMIN — CALCIUM 500 MG: 500 TABLET ORAL at 10:24

## 2022-09-20 RX ADMIN — REMDESIVIR 100 MG: 100 INJECTION, POWDER, LYOPHILIZED, FOR SOLUTION INTRAVENOUS at 18:46

## 2022-09-20 RX ADMIN — SODIUM CHLORIDE 50 ML: 9 INJECTION, SOLUTION INTRAVENOUS at 18:45

## 2022-09-20 RX ADMIN — DOCUSATE SODIUM AND SENNOSIDES 1 TABLET: 50; 8.6 TABLET ORAL at 22:21

## 2022-09-20 RX ADMIN — DEXAMETHASONE 6 MG: 2 TABLET ORAL at 06:43

## 2022-09-20 RX ADMIN — DILTIAZEM HYDROCHLORIDE 180 MG: 180 CAPSULE, EXTENDED RELEASE ORAL at 10:23

## 2022-09-20 RX ADMIN — WARFARIN SODIUM 3.75 MG: 2.5 TABLET ORAL at 18:45

## 2022-09-20 RX ADMIN — BUMETANIDE 2 MG: 2 TABLET ORAL at 06:43

## 2022-09-20 ASSESSMENT — ACTIVITIES OF DAILY LIVING (ADL)
ADLS_ACUITY_SCORE: 32
ADLS_ACUITY_SCORE: 32
ADLS_ACUITY_SCORE: 34
ADLS_ACUITY_SCORE: 34
ADLS_ACUITY_SCORE: 32
ADLS_ACUITY_SCORE: 34
ADLS_ACUITY_SCORE: 32

## 2022-09-20 NOTE — PROGRESS NOTES
[facility]    Progress Note - Hospitalist Service       Date of Admission:  9/16/2022    Assessment & Plan          Alva Dumont is a 89 year old female admitted on 9/16/2022. She has a history of Afib on coumadin, HFpEF, Hypertrophic obstructive cardiomyopathy, CVA, HTN, CKD3 and is admitted for covid pneumonia.     # Confirmed COVID-19 infection    # Viral Pneumonia secondary to COVID-19 infection  #Hypoxia  Symptoms started 9/11/22. O2 needs have improved, but patient feels her breathing is better on 1/2 LPM by NC. She is not on O2 at baseline. CXR notable for pulmonary edema. Labs unremarklabe on admission. Now having cough, though has improved.    Symptom Onset 9/11/22   Date of 1st Positive Test 9/11/22   Vaccination Status Fully Vaccinated       - COVID-19 special precautions, continuous pulse-ox  - Oxygen: continue current support with O2 Device: Nasal cannula at Oxygen Delivery: titrate to keep SpO2 between 90-96% or 1/2 LPM for comfort  - Labs: Daily COVID labs ordered x4 days (CBC, BMP, D-dimer, CRP).  Continue to trend after 4 days as needed.   - Imaging: chest x-ray ordered showing Increasing cardiomegaly. Increasing prominence of central pulmonary vasculature with mild interstitial edema pattern. Small to moderate right pleural effusion and trace left effusion. No pneumothorax. No acute bony abnormality.  - Breathing treatments: no inhalers needed; avoid nebulizers in favor of MDIs      - IV fluids: not indicated at this time  - Antibiotics: not indicated   - COVID-Focused Medications: Dexamethasone 6 mg x 10 days or until hospital discharge, started on 9/16 and Remdesivir x 5 days or until hospital discharge, started on 9/16  - DVT Prophylaxis:         - At high risk of thrombotic complications due to COVID-19 (DDimer = 0.32 ug/mL FEU (Ref range: 0.00 - 0.50 ug/mL FEU) )         - Already on therapeutic anticoagulation with warfarin   -telemetry   -incentive spirometer    -tessalon, mucinex as needed for cough and mucus   -home O2 trial today    HTN  HFpEF  Hypertrophic obstructive cardiomyopathy  On PTA hydralazine 25 mg BID, losartan 100 mg, metoprolol 75 mg BID. PTA bumex 2 mg AM, 1 mg at bedtime PO. Blood pressure 107/56 9/20 AM. Held losartan and hydralazine.  - continue PTA metoprolol  - hold PTA losartan  - hold PTA hydralazine  - continue PTA bumex 2 mg AM, 1 mg at bedtime PO    Afib  - pharmacy to dose warfarin              - supratherapeutic on admission  - PTA diltiazem 180 mg, metoprolol    HLD  - PTA statin  Recurrent UTIs  - PTA keflex 250 mg at bedtime  - PTA flomax  Constipation  - PTA senna-docusate  GERD   - PTA famotidine         Diet: Combination Diet Regular Diet Adult    DVT Prophylaxis: PTA Warfarin  Short Catheter: Not present  Fluids: po  Central Lines: None  Cardiac Monitoring: ACTIVE order. Indication: Tachyarrhythmias, acute (48 hours)  Code Status: No CPR- Do NOT Intubate      Disposition Plan   Likely 9/21, to home.        The patient's care was discussed with the Attending Physician, Dr. Ruff.     Chetna Conklin MD  Hospitalist Service  [facility]  Securely message with the [employer] Web Console (learn more here)  Text page via [employer] Paging/Directory         Clinically Significant Risk Factors Present on Admission                      ______________________________________________________________________    Interval History    Feeling better today. Cough has improved. Feels occasionally short of breath after getting up to use the bathroom. Has to hold onto things while walking, but denies feeling lightheaded.  Denies fever, chills, chest pain, heart palpitation, N/V, diarrhea, or constipation.    Data reviewed today: I reviewed all medications, new labs and imaging results over the last 24 hours. I personally reviewed the EKG and chest x-ray image(s).    Physical Exam   Vital Signs: Temp: 97.1  F (36.2  C) Temp src: Tympanic  BP: 107/56 Pulse: (!) 127   Resp: 20 SpO2: 92 % O2 Device: Nasal cannula Oxygen Delivery: 1/2 LPM  Weight: 113 lbs 1.6 oz  Constitutional: alert, cooperative, no apparent distress, and appears stated age  Eyes: Lids and lashes normal, pupils equal, round and reactive to light, extra ocular muscles intact, sclera clear, conjunctiva normal  ENT: normocepalic, without obvious abnormality  Respiratory: normal work of breathing, lungs clear to auscultation bilaterally  Cardiovascular: Irregularly irregular, A fib on bedside tele monitor, no murmur noted   GI: Normal bowel sounds, soft, non-distended, non-tender, no masses palpated  Skin: normal skin color, texture, turgor  Musculoskeletal: no lower extremity pitting edema present, there is no redness, warmth, or swelling of the joints, tone is normal  Neurologic: Awake, alert, oriented to name, place and time. Cranial nerves II-XII are grossly intact.  Motor is 5 out of 5 bilaterally.   Sensory is intact.   Neuropsychiatric: General: normal, calm and normal eye contact, Affect: normal and pleasant    Data   Recent Labs   Lab 09/20/22  0933 09/20/22  0813 09/19/22  0701 09/18/22  1214 09/18/22  0625 09/17/22  0401 09/16/22  1807   WBC 8.3  --  7.3  --  5.4 3.3* 4.2   HGB 13.1  --  12.5  --  12.0 11.4* 12.1   MCV 85  --  84  --  85 85 85     --  199  --  180 153 170   INR  --   --  2.82*  --  3.58* 5.73* 6.25*     --  137  --  137 136 135*   POTASSIUM 3.9  --  3.6 4.6 3.3* 3.9 3.3*   CHLORIDE 92*  --  95*  --  94* 97* 93*   CO2 27  --  31  --  32* 28 31   BUN 41*  --  41*  --  26 18 17   CR 1.10  --  1.00  --  0.86 0.86 0.90   ANIONGAP 17  --  11  --  11 11 11   YADY 8.7  --  9.0  --  8.6 8.1* 8.5   * 181* 182*  --  157* 168* 121   ALBUMIN  --   --   --   --   --   --  3.6   PROTTOTAL  --   --   --   --   --   --  6.7   BILITOTAL  --   --   --   --   --   --  0.8   ALKPHOS  --   --   --   --   --   --  92   ALT  --   --   --   --   --   --  18   AST  --    --   --   --   --   --  25   LIPASE  --   --   --   --   --   --  34     No results found for this or any previous visit (from the past 24 hour(s)).  Medications     - MEDICATION INSTRUCTIONS -         remdesivir  100 mg Intravenous Q24H    And     sodium chloride 0.9%  50 mL Intravenous Q24H     atorvastatin  20 mg Oral Daily with lunch     bumetanide  1 mg Oral At Bedtime     bumetanide  2 mg Oral QAM AC     calcium carbonate 500 mg (elemental)  500 mg Oral TID w/meals     cephALEXin  250 mg Oral At Bedtime     dexamethasone  6 mg Oral QAM     diltiazem ER  180 mg Oral Daily     famotidine  20 mg Oral Daily     guaiFENesin  600 mg Oral BID     hydrALAZINE  25 mg Oral BID     losartan  100 mg Oral Daily     metoprolol tartrate  75 mg Oral BID     senna-docusate  1 tablet Oral At Bedtime     sodium chloride (PF)  3 mL Intracatheter Q8H     tamsulosin  0.4 mg Oral At Bedtime     Warfarin Therapy Reminder  1 each Oral See Admin Instructions

## 2022-09-20 NOTE — PROGRESS NOTES
CLINICAL NUTRITION SERVICES     Reviewed nutrition risk factors due to MST screen. Covid+ patient, not able to see in person.  Pt is tolerating diet, eating well per nursing documentation. # meals per day with 100% intake reported.   Weight trends: weight loss is not significant  Wt Readings from Last 10 Encounters:   09/19/22 51.3 kg (113 lb 1.6 oz)   05/09/22 54.9 kg (121 lb)   05/06/22 54.9 kg (121 lb)   04/30/22 55.1 kg (121 lb 7 oz)   12/09/13 53.7 kg (118 lb 6.4 oz)   10/15/13 53.4 kg (117 lb 11.2 oz)   02/12/13 55.9 kg (123 lb 3.2 oz)   12/26/12 54.6 kg (120 lb 5.9 oz)   11/28/12 55.9 kg (123 lb 3.8 oz)   07/31/12 54.8 kg (120 lb 12.8 oz)     No nutrition issues identified at this time. RD will follow peripherally at this time, unless consulted.    Yuli Saucedo RDN, LD

## 2022-09-20 NOTE — PLAN OF CARE
Problem: Fatigue  Goal: Improved Activity Tolerance  Outcome: Ongoing, Progressing     Problem: Hypertension Comorbidity  Goal: Blood Pressure in Desired Range  Outcome: Ongoing, Progressing  Intervention: Maintain Blood Pressure Management  Recent Flowsheet Documentation  Taken 9/19/2022 2130 by Francisca Salinas, RN  Medication Review/Management: medications reviewed  Taken 9/19/2022 1629 by Francisca Salinas, RN  Medication Review/Management: medications reviewed   Goal Outcome Evaluation:  Patient continue on tele monitoring in afib this shift- all anytihypertensives administered as scheduled.   No complaints of pain.  Francisca Salinas, RN

## 2022-09-20 NOTE — PLAN OF CARE
Problem: Gas Exchange Impaired  Goal: Optimal Gas Exchange  Outcome: Ongoing, Progressing    Problem: Plan of Care - These are the overarching goals to be used throughout the patient stay.    Goal: Optimal Comfort and Wellbeing  Outcome: Ongoing, Progressing       Patient alert and oriented. Denies pain. Resting comfortably throughout shift. On 1/2L O2, expiratory wheezes present. Tolerated ambulation to bedside commode well. Isolation precautions maintained. VSS.

## 2022-09-21 LAB
ANION GAP SERPL CALCULATED.3IONS-SCNC: 18 MMOL/L (ref 5–18)
BUN SERPL-MCNC: 56 MG/DL (ref 8–28)
CALCIUM SERPL-MCNC: 8.5 MG/DL (ref 8.5–10.5)
CHLORIDE BLD-SCNC: 88 MMOL/L (ref 98–107)
CO2 SERPL-SCNC: 27 MMOL/L (ref 22–31)
CREAT SERPL-MCNC: 1.32 MG/DL (ref 0.6–1.1)
GFR SERPL CREATININE-BSD FRML MDRD: 37 ML/MIN/1.73M2
GLUCOSE BLD-MCNC: 497 MG/DL (ref 70–125)
GLUCOSE BLDC GLUCOMTR-MCNC: 305 MG/DL (ref 70–99)
GLUCOSE BLDC GLUCOMTR-MCNC: 333 MG/DL (ref 70–99)
GLUCOSE BLDC GLUCOMTR-MCNC: 412 MG/DL (ref 70–99)
INR PPP: 3.04 (ref 0.85–1.15)
POTASSIUM BLD-SCNC: 3.8 MMOL/L (ref 3.5–5)
SODIUM SERPL-SCNC: 133 MMOL/L (ref 136–145)

## 2022-09-21 PROCEDURE — 250N000013 HC RX MED GY IP 250 OP 250 PS 637: Performed by: STUDENT IN AN ORGANIZED HEALTH CARE EDUCATION/TRAINING PROGRAM

## 2022-09-21 PROCEDURE — 80048 BASIC METABOLIC PNL TOTAL CA: CPT | Performed by: STUDENT IN AN ORGANIZED HEALTH CARE EDUCATION/TRAINING PROGRAM

## 2022-09-21 PROCEDURE — 250N000012 HC RX MED GY IP 250 OP 636 PS 637: Performed by: STUDENT IN AN ORGANIZED HEALTH CARE EDUCATION/TRAINING PROGRAM

## 2022-09-21 PROCEDURE — 36415 COLL VENOUS BLD VENIPUNCTURE: CPT | Performed by: STUDENT IN AN ORGANIZED HEALTH CARE EDUCATION/TRAINING PROGRAM

## 2022-09-21 PROCEDURE — 120N000001 HC R&B MED SURG/OB

## 2022-09-21 PROCEDURE — 250N000013 HC RX MED GY IP 250 OP 250 PS 637: Performed by: FAMILY MEDICINE

## 2022-09-21 PROCEDURE — 99232 SBSQ HOSP IP/OBS MODERATE 35: CPT | Mod: GC | Performed by: STUDENT IN AN ORGANIZED HEALTH CARE EDUCATION/TRAINING PROGRAM

## 2022-09-21 PROCEDURE — 85610 PROTHROMBIN TIME: CPT | Performed by: STUDENT IN AN ORGANIZED HEALTH CARE EDUCATION/TRAINING PROGRAM

## 2022-09-21 PROCEDURE — 250N000011 HC RX IP 250 OP 636: Performed by: STUDENT IN AN ORGANIZED HEALTH CARE EDUCATION/TRAINING PROGRAM

## 2022-09-21 RX ORDER — DEXTROSE MONOHYDRATE 25 G/50ML
25-50 INJECTION, SOLUTION INTRAVENOUS
Status: DISCONTINUED | OUTPATIENT
Start: 2022-09-21 | End: 2022-09-22 | Stop reason: HOSPADM

## 2022-09-21 RX ORDER — NICOTINE POLACRILEX 4 MG
15-30 LOZENGE BUCCAL
Status: DISCONTINUED | OUTPATIENT
Start: 2022-09-21 | End: 2022-09-22 | Stop reason: HOSPADM

## 2022-09-21 RX ORDER — WARFARIN SODIUM 1 MG/1
1 TABLET ORAL
Status: COMPLETED | OUTPATIENT
Start: 2022-09-21 | End: 2022-09-21

## 2022-09-21 RX ADMIN — WARFARIN SODIUM 1 MG: 1 TABLET ORAL at 18:15

## 2022-09-21 RX ADMIN — BUMETANIDE 2 MG: 2 TABLET ORAL at 06:54

## 2022-09-21 RX ADMIN — METOPROLOL TARTRATE 75 MG: 50 TABLET, FILM COATED ORAL at 09:53

## 2022-09-21 RX ADMIN — GUAIFENESIN 600 MG: 600 TABLET, EXTENDED RELEASE ORAL at 21:52

## 2022-09-21 RX ADMIN — ACETAMINOPHEN 650 MG: 325 TABLET, FILM COATED ORAL at 01:17

## 2022-09-21 RX ADMIN — INSULIN ASPART 6 UNITS: 100 INJECTION, SOLUTION INTRAVENOUS; SUBCUTANEOUS at 14:44

## 2022-09-21 RX ADMIN — METOPROLOL TARTRATE 75 MG: 50 TABLET, FILM COATED ORAL at 21:51

## 2022-09-21 RX ADMIN — DEXAMETHASONE 6 MG: 2 TABLET ORAL at 06:54

## 2022-09-21 RX ADMIN — ATORVASTATIN CALCIUM 20 MG: 10 TABLET, FILM COATED ORAL at 12:15

## 2022-09-21 RX ADMIN — INSULIN ASPART 4 UNITS: 100 INJECTION, SOLUTION INTRAVENOUS; SUBCUTANEOUS at 18:15

## 2022-09-21 RX ADMIN — CALCIUM 500 MG: 500 TABLET ORAL at 12:16

## 2022-09-21 RX ADMIN — DOCUSATE SODIUM AND SENNOSIDES 1 TABLET: 50; 8.6 TABLET ORAL at 21:51

## 2022-09-21 RX ADMIN — TAMSULOSIN HYDROCHLORIDE 0.4 MG: 0.4 CAPSULE ORAL at 21:52

## 2022-09-21 RX ADMIN — GUAIFENESIN 600 MG: 600 TABLET, EXTENDED RELEASE ORAL at 09:53

## 2022-09-21 RX ADMIN — CALCIUM 500 MG: 500 TABLET ORAL at 09:53

## 2022-09-21 RX ADMIN — ACETAMINOPHEN 650 MG: 325 TABLET, FILM COATED ORAL at 22:03

## 2022-09-21 RX ADMIN — DILTIAZEM HYDROCHLORIDE 180 MG: 180 CAPSULE, EXTENDED RELEASE ORAL at 09:53

## 2022-09-21 RX ADMIN — CALCIUM 500 MG: 500 TABLET ORAL at 18:15

## 2022-09-21 RX ADMIN — BUMETANIDE 1 MG: 1 TABLET ORAL at 21:50

## 2022-09-21 RX ADMIN — CEPHALEXIN 250 MG: 250 CAPSULE ORAL at 21:52

## 2022-09-21 RX ADMIN — Medication 5 MG: at 22:03

## 2022-09-21 ASSESSMENT — ACTIVITIES OF DAILY LIVING (ADL)
ADLS_ACUITY_SCORE: 32

## 2022-09-21 NOTE — PLAN OF CARE
Problem: Gas Exchange Impaired  Goal: Optimal Gas Exchange  Outcome: Ongoing, Progressing  Intervention: Optimize Oxygenation and Ventilation  Recent Flowsheet Documentation  Taken 9/20/2022 1205 by Dotty Alexander RN  Head of Bed (HOB) Positioning: HOB at 20-30 degrees  Taken 9/20/2022 0945 by Dotty Alexander RN  Head of Bed ([facility]) Positioning: HOB at 20-30 degrees     Pt is A/O x 4. Denies any pain. Pt on room air tolerating well. Oxygen saturation maintaining at 91-93%. Purwick in place. VSS. Dicharge planning for 9/21 with granddaughter.

## 2022-09-21 NOTE — PLAN OF CARE
Problem: Plan of Care - These are the overarching goals to be used throughout the patient stay.    Goal: Optimal Comfort and Wellbeing  Outcome: Ongoing, Progressing  Intervention: Monitor Pain and Promote Comfort  Recent Flowsheet Documentation  Taken 9/21/2022 0117 by Sosa Kaba RN  Pain Management Interventions: medication (see MAR)   Goal Outcome Evaluation:  Patient is alert and oriented. PRN Tylenol administered for C/O headache with relief. Was on 1L 02 at night. Declined Home oxygen assessment overnight. Telemetry reading Afib. Voiding through the pure wick. Was up to the bathroom with A1. IV saline locked. Bed alarm activated for safety.

## 2022-09-21 NOTE — PROVIDER NOTIFICATION
09/21/22 1005   Home Oxygen Assessment (RN/RT ONLY)   Does patient have oxygen at home? No   1. SpO2 on room air at rest while awake 89   2. SpO2 while at rest on oxygen 94       Oxygen LPM at rest 1   3. SpO2 on room air during Activity/with exercise 84   4. SpO2 with oxygen during activity/with exercise 92       Oxygen LPM during activity/with exercise 3     At rest patient on 1L oxygen sating at 94%. On room air at rest patient drops to 88-89%. Patient got out of bed with assist of one a walker and a gait belt. Attempted activity on room air oxygen saturation dropped to 82-84%. 3L of oxygen applied during activity to keep oxygen saturation greater than 90%.     Also noted patient HR increased to 163 bpm with activity from bed to chair. Patient remains on 1L O2 at rest in the chair and HR back to  bpm at rest.

## 2022-09-21 NOTE — PROGRESS NOTES
Patient alert and oriented x4.  Nil complaints of pain. VSS. On room  Air.  Requires assist of one.  puriwick in place.  For home tommorow.  Bed alarms on.

## 2022-09-21 NOTE — PROGRESS NOTES
Care Management Follow Up    Length of Stay (days): 5    Expected Discharge Date: 09/22/2022     Concerns to be Addressed: 02    Patient plan of care discussed at interdisciplinary rounds: Yes    Anticipated Discharge Disposition: Home     Anticipated Discharge Services: None  Anticipated Discharge DME:  Possible 02     Patient/family educated on Medicare website which has current facility and service quality ratings:  Not at this time  Education Provided on the Discharge Plan:  Yes   Patient/Family in Agreement with the Plan: yes    Referrals Placed by CM/SW:  None at this time   Private pay costs discussed: Not applicable    Additional Information:  Pt continues on 02.  Waiting for PT/ OT recs.  Pt lives in house with her daughter and son-in-law.        GIN Harman

## 2022-09-21 NOTE — PLAN OF CARE
Problem: Gas Exchange Impaired  Goal: Optimal Gas Exchange  Outcome: Ongoing, Progressing     Problem: Hyperglycemia  Goal: Blood Glucose Level Within Targeted Range  Outcome: Ongoing, Progressing     Pt A/O x 4 denies any pain. Pt does report SOB with exertion. Oxygen saturations decline with exertion but return to 91-93% at rest. Patient on 0.5L NC. purewick in use. Had a BM on this shift. BG drawn today 412 being managed with sliding scale. Home oxygen evaluation was completed on this shift. Pt assist of 1 with gait belt and walker for small distances in the room. VSS. Possible discharge tomorrow 9/21 with daughter.

## 2022-09-21 NOTE — PROGRESS NOTES
Addendum to Progress Note dated  09/18/22     # Acute on CHFpEF  History of congestive heart failure.  Last echo from 4/21 revealed ejection fraction of 62% LV volume diastolic of 43.6. Presented with symptoms of shortness of breath and found to be hypoxic.  Also he did not reveal 1+ LE edema.  Chest x-ray notable for pulmonary edema.  Risk Factors:  Cardiomegaly, a-fib, hx of CKD3, Hx of Hypertrophic obstructive cardiomyopathy, Hypertension  -Oxygen supplementation as needed  -Bumex 2 mg twice daily      Ankur Hatch, PGY3

## 2022-09-21 NOTE — PROGRESS NOTES
[facility]    Progress Note - Hospitalist Service       Date of Admission:  9/16/2022    Assessment & Plan          Alva Dumont is a 89 year old female admitted on 9/16/2022. She has a history of Afib on coumadin, HFpEF, Hypertrophic obstructive cardiomyopathy, CVA, HTN, CKD3 and is admitted for covid pneumonia.     # Confirmed COVID-19 infection    # Viral Pneumonia secondary to COVID-19 infection  #Hypoxia  Symptoms started 9/11/22. O2 needs have improved, but patient feels her breathing is better on 1/2 LPM by NC. She is not on O2 at baseline. CXR notable for pulmonary edema. Labs unremarklabe on admission. Now having cough, though has improved. Qualifies for home O2 as patient drops to 88-89% on RA, and dropped to 82-84% with activity.     Symptom Onset 9/11/22   Date of 1st Positive Test 9/11/22   Vaccination Status Fully Vaccinated       - COVID-19 special precautions, continuous pulse-ox  - Oxygen: continue current support with O2 Device: Nasal cannula at Oxygen Delivery: titrate to keep SpO2 between 90-96% or 1/2 LPM for comfort  - Labs: Daily COVID labs ordered x4 days (CBC, BMP, D-dimer, CRP).  Continue to trend after 4 days as needed.   - Imaging: chest x-ray ordered showing Increasing cardiomegaly. Increasing prominence of central pulmonary vasculature with mild interstitial edema pattern. Small to moderate right pleural effusion and trace left effusion. No pneumothorax. No acute bony abnormality.  - Breathing treatments: no inhalers needed; avoid nebulizers in favor of MDIs      - IV fluids: not indicated at this time  - Antibiotics: not indicated   - COVID-Focused Medications: Dexamethasone 6 mg x 10 days or until hospital discharge, started on 9/16 and Remdesivir x 5 days or until hospital discharge, started on 9/16  - DVT Prophylaxis:         - At high risk of thrombotic complications due to COVID-19 (DDimer = 0.32 ug/mL FEU (Ref range: 0.00 - 0.50 ug/mL FEU) )          - Already on therapeutic anticoagulation with warfarin   -telemetry   -incentive spirometer   -tessalon, mucinex as needed for cough and mucus     HTN  HFpEF  Hypertrophic obstructive cardiomyopathy  On PTA hydralazine 25 mg BID, losartan 100 mg, metoprolol 75 mg BID. PTA bumex 2 mg AM, 1 mg at bedtime PO. Blood pressure 107/56 9/20 AM. Held losartan and hydralazine.  - continue PTA metoprolol  - hold PTA losartan  - hold PTA hydralazine  - continue PTA bumex 2 mg AM, 1 mg at bedtime PO    Afib  - pharmacy to dose warfarin              - supratherapeutic on admission  - PTA diltiazem 180 mg, metoprolol    HLD  - PTA statin  Recurrent UTIs  - PTA keflex 250 mg at bedtime  - PTA flomax  Constipation  - PTA senna-docusate  GERD   - PTA famotidine         Diet: Combination Diet Regular Diet Adult    DVT Prophylaxis: PTA Warfarin  Short Catheter: Not present  Fluids: po  Central Lines: None  Cardiac Monitoring: ACTIVE order. Indication: Tachyarrhythmias, acute (48 hours)  Code Status: No CPR- Do NOT Intubate      Disposition Plan   Likely 9/22, to home.        The patient's care was discussed with the Attending Physician, Dr. Patterson.     Chetna Conklin MD  Hospitalist Service  [facility]  Securely message with the [employer] Web Console (learn more here)  Text page via [employer] Paging/Directory         Clinically Significant Risk Factors Present on Admission         # Acute Kidney Injury, unspecified: based on a >150% or 0.3 mg/dL increase in creatinine on admission compared to past 90 day average, will monitor renal function              ______________________________________________________________________    Interval History    Feeling ok today. Cough has improved slightly but still bothering her. Feels occasionally short of breath after getting up to use the bathroom. Has to hold onto things while walking, but denies feeling lightheaded.  Denies fever, chills, chest pain, heart palpitation, N/V,  diarrhea, or constipation.    Data reviewed today: I reviewed all medications, new labs and imaging results over the last 24 hours. I personally reviewed the EKG and chest x-ray image(s).    Physical Exam   Vital Signs: Temp: 97.5  F (36.4  C) Temp src: Oral BP: (!) 150/69 Pulse: 102   Resp: 18 SpO2: (S) 92 % (at rest in the chair) O2 Device: Nasal cannula Oxygen Delivery: (S) 1/2 LPM  Weight: 113 lbs 1.6 oz  Constitutional: alert, cooperative, no apparent distress, and appears stated age  Eyes: Lids and lashes normal, pupils equal, round and reactive to light, extra ocular muscles intact, sclera clear, conjunctiva normal  ENT: normocepalic, without obvious abnormality  Respiratory: normal work of breathing, lungs with crackles at LLL  Cardiovascular: Irregularly irregular, A fib on bedside tele monitor, no murmur noted   GI: Normal bowel sounds, soft, non-distended, non-tender, no masses palpated  Skin: normal skin color, texture, turgor  Musculoskeletal: no lower extremity pitting edema present, there is no redness, warmth, or swelling of the joints, tone is normal  Neurologic: Awake, alert, oriented to name, place and time. Cranial nerves II-XII are grossly intact.  Motor is 5 out of 5 bilaterally.   Sensory is intact.   Neuropsychiatric: General: normal, calm and normal eye contact, Affect: normal and pleasant    Data   Recent Labs   Lab 09/21/22  1224 09/20/22  1349 09/20/22  1244 09/20/22  0933 09/20/22  0813 09/19/22  0701 09/17/22  0401 09/16/22  1807   WBC  --  10.1  --  8.3  --  7.3   < > 4.2   HGB  --  12.7  --  13.1  --  12.5   < > 12.1   MCV  --  84  --  85  --  84   < > 85   PLT  --  229  --  176  --  199   < > 170   INR 3.04* 2.44*  --   --   --  2.82*   < > 6.25*   *  --   --  136  --  137   < > 135*   POTASSIUM 3.8  --   --  3.9  --  3.6   < > 3.3*   CHLORIDE 88*  --   --  92*  --  95*   < > 93*   CO2 27  --   --  27  --  31   < > 31   BUN 56*  --   --  41*  --  41*   < > 17   CR 1.32*  --    --  1.10  --  1.00   < > 0.90   ANIONGAP 18  --   --  17  --  11   < > 11   YADY 8.5  --   --  8.7  --  9.0   < > 8.5   *  --  358* 336*   < > 182*   < > 121   ALBUMIN  --   --   --   --   --   --   --  3.6   PROTTOTAL  --   --   --   --   --   --   --  6.7   BILITOTAL  --   --   --   --   --   --   --  0.8   ALKPHOS  --   --   --   --   --   --   --  92   ALT  --   --   --   --   --   --   --  18   AST  --   --   --   --   --   --   --  25   LIPASE  --   --   --   --   --   --   --  34    < > = values in this interval not displayed.     No results found for this or any previous visit (from the past 24 hour(s)).  Medications     - MEDICATION INSTRUCTIONS -         atorvastatin  20 mg Oral Daily with lunch     bumetanide  1 mg Oral At Bedtime     bumetanide  2 mg Oral QAM AC     calcium carbonate 500 mg (elemental)  500 mg Oral TID w/meals     cephALEXin  250 mg Oral At Bedtime     dexamethasone  6 mg Oral QAM     diltiazem ER  180 mg Oral Daily     [START ON 9/22/2022] famotidine  20 mg Oral Every Other Day     guaiFENesin  600 mg Oral BID     [Held by provider] hydrALAZINE  25 mg Oral BID     insulin aspart  1-7 Units Subcutaneous TID AC     insulin aspart  1-5 Units Subcutaneous At Bedtime     [Held by provider] losartan  100 mg Oral Daily     metoprolol tartrate  75 mg Oral BID     senna-docusate  1 tablet Oral At Bedtime     sodium chloride (PF)  3 mL Intracatheter Q8H     tamsulosin  0.4 mg Oral At Bedtime     Warfarin Therapy Reminder  1 each Oral See Admin Instructions

## 2022-09-22 ENCOUNTER — APPOINTMENT (OUTPATIENT)
Dept: PHYSICAL THERAPY | Facility: CLINIC | Age: 87
DRG: 177 | End: 2022-09-22
Payer: MEDICARE

## 2022-09-22 ENCOUNTER — DOCUMENTATION ONLY (OUTPATIENT)
Dept: HOME HEALTH SERVICES | Facility: CLINIC | Age: 87
End: 2022-09-22

## 2022-09-22 VITALS
HEIGHT: 57 IN | WEIGHT: 116.4 LBS | OXYGEN SATURATION: 94 % | DIASTOLIC BLOOD PRESSURE: 64 MMHG | SYSTOLIC BLOOD PRESSURE: 133 MMHG | HEART RATE: 95 BPM | RESPIRATION RATE: 16 BRPM | TEMPERATURE: 97.9 F | BODY MASS INDEX: 25.11 KG/M2

## 2022-09-22 PROBLEM — J12.82 PNEUMONIA DUE TO 2019 NOVEL CORONAVIRUS: Status: ACTIVE | Noted: 2022-09-22

## 2022-09-22 PROBLEM — U07.1 PNEUMONIA DUE TO 2019 NOVEL CORONAVIRUS: Status: ACTIVE | Noted: 2022-09-22

## 2022-09-22 LAB
ANION GAP SERPL CALCULATED.3IONS-SCNC: 11 MMOL/L (ref 5–18)
BUN SERPL-MCNC: 59 MG/DL (ref 8–28)
CALCIUM SERPL-MCNC: 8.7 MG/DL (ref 8.5–10.5)
CHLORIDE BLD-SCNC: 90 MMOL/L (ref 98–107)
CO2 SERPL-SCNC: 33 MMOL/L (ref 22–31)
CREAT SERPL-MCNC: 1.09 MG/DL (ref 0.6–1.1)
ERYTHROCYTE [DISTWIDTH] IN BLOOD BY AUTOMATED COUNT: 13.2 % (ref 10–15)
GFR SERPL CREATININE-BSD FRML MDRD: 47 ML/MIN/1.73M2
GLUCOSE BLD-MCNC: 188 MG/DL (ref 70–125)
GLUCOSE BLDC GLUCOMTR-MCNC: 207 MG/DL (ref 70–99)
GLUCOSE BLDC GLUCOMTR-MCNC: 265 MG/DL (ref 70–99)
HCT VFR BLD AUTO: 36.5 % (ref 35–47)
HGB BLD-MCNC: 12.2 G/DL (ref 11.7–15.7)
INR PPP: 3.19 (ref 0.85–1.15)
MCH RBC QN AUTO: 27.5 PG (ref 26.5–33)
MCHC RBC AUTO-ENTMCNC: 33.4 G/DL (ref 31.5–36.5)
MCV RBC AUTO: 82 FL (ref 78–100)
PLATELET # BLD AUTO: 225 10E3/UL (ref 150–450)
POTASSIUM BLD-SCNC: 3.6 MMOL/L (ref 3.5–5)
RBC # BLD AUTO: 4.44 10E6/UL (ref 3.8–5.2)
SODIUM SERPL-SCNC: 134 MMOL/L (ref 136–145)
WBC # BLD AUTO: 10.3 10E3/UL (ref 4–11)

## 2022-09-22 PROCEDURE — 85014 HEMATOCRIT: CPT | Performed by: STUDENT IN AN ORGANIZED HEALTH CARE EDUCATION/TRAINING PROGRAM

## 2022-09-22 PROCEDURE — 99238 HOSP IP/OBS DSCHRG MGMT 30/<: CPT | Mod: GC | Performed by: STUDENT IN AN ORGANIZED HEALTH CARE EDUCATION/TRAINING PROGRAM

## 2022-09-22 PROCEDURE — 250N000011 HC RX IP 250 OP 636: Performed by: STUDENT IN AN ORGANIZED HEALTH CARE EDUCATION/TRAINING PROGRAM

## 2022-09-22 PROCEDURE — 85610 PROTHROMBIN TIME: CPT | Performed by: STUDENT IN AN ORGANIZED HEALTH CARE EDUCATION/TRAINING PROGRAM

## 2022-09-22 PROCEDURE — 97161 PT EVAL LOW COMPLEX 20 MIN: CPT | Mod: GP

## 2022-09-22 PROCEDURE — 250N000013 HC RX MED GY IP 250 OP 250 PS 637: Performed by: STUDENT IN AN ORGANIZED HEALTH CARE EDUCATION/TRAINING PROGRAM

## 2022-09-22 PROCEDURE — 97116 GAIT TRAINING THERAPY: CPT | Mod: GP

## 2022-09-22 PROCEDURE — 97530 THERAPEUTIC ACTIVITIES: CPT | Mod: GP

## 2022-09-22 PROCEDURE — 80048 BASIC METABOLIC PNL TOTAL CA: CPT | Performed by: STUDENT IN AN ORGANIZED HEALTH CARE EDUCATION/TRAINING PROGRAM

## 2022-09-22 PROCEDURE — 250N000013 HC RX MED GY IP 250 OP 250 PS 637: Performed by: FAMILY MEDICINE

## 2022-09-22 PROCEDURE — 36415 COLL VENOUS BLD VENIPUNCTURE: CPT | Performed by: STUDENT IN AN ORGANIZED HEALTH CARE EDUCATION/TRAINING PROGRAM

## 2022-09-22 RX ORDER — BENZONATATE 100 MG/1
100 CAPSULE ORAL 3 TIMES DAILY PRN
Qty: 30 CAPSULE | Refills: 0 | Status: SHIPPED | OUTPATIENT
Start: 2022-09-22 | End: 2024-08-24

## 2022-09-22 RX ORDER — GUAIFENESIN 600 MG/1
600 TABLET, EXTENDED RELEASE ORAL 2 TIMES DAILY
Qty: 14 TABLET | Refills: 0 | Status: ON HOLD | OUTPATIENT
Start: 2022-09-22 | End: 2022-10-15

## 2022-09-22 RX ORDER — WARFARIN SODIUM 2.5 MG/1
2.5 TABLET ORAL AT BEDTIME
Start: 2022-09-23 | End: 2024-08-24

## 2022-09-22 RX ADMIN — BUMETANIDE 2 MG: 2 TABLET ORAL at 06:30

## 2022-09-22 RX ADMIN — METOPROLOL TARTRATE 75 MG: 50 TABLET, FILM COATED ORAL at 09:00

## 2022-09-22 RX ADMIN — CALCIUM 500 MG: 500 TABLET ORAL at 09:00

## 2022-09-22 RX ADMIN — INSULIN ASPART 2 UNITS: 100 INJECTION, SOLUTION INTRAVENOUS; SUBCUTANEOUS at 09:00

## 2022-09-22 RX ADMIN — FAMOTIDINE 20 MG: 20 TABLET ORAL at 09:00

## 2022-09-22 RX ADMIN — INSULIN ASPART 3 UNITS: 100 INJECTION, SOLUTION INTRAVENOUS; SUBCUTANEOUS at 13:58

## 2022-09-22 RX ADMIN — ATORVASTATIN CALCIUM 20 MG: 10 TABLET, FILM COATED ORAL at 13:53

## 2022-09-22 RX ADMIN — DEXAMETHASONE 6 MG: 2 TABLET ORAL at 06:30

## 2022-09-22 RX ADMIN — CALCIUM 500 MG: 500 TABLET ORAL at 13:53

## 2022-09-22 RX ADMIN — DILTIAZEM HYDROCHLORIDE 180 MG: 180 CAPSULE, EXTENDED RELEASE ORAL at 09:00

## 2022-09-22 RX ADMIN — GUAIFENESIN 600 MG: 600 TABLET, EXTENDED RELEASE ORAL at 09:00

## 2022-09-22 ASSESSMENT — ACTIVITIES OF DAILY LIVING (ADL)
ADLS_ACUITY_SCORE: 32

## 2022-09-22 NOTE — DISCHARGE INSTRUCTIONS
Home care services have been arrange for you    Home Care Agency: [facility]    Home Care Phone: [phone]    Services: PT/RN    Instructions: Home care will visit within 48 hrs after discharge.  Provider will call you to schedule visit.

## 2022-09-22 NOTE — PROGRESS NOTES
Received intake for home oxygen. Assigned to Stephanie LUCERO [phone]    10:40 AM - Sent text page to provider requesting the oxygen smartphrase be added to chart.   10:50 AM - Spoke with provider and informed we need the [id number] dot phrase entered in a progress note or on the discharge summary. Also requested the O2 Sats be entered in to chart if available.   10:49 AM - Received oxygen smart phrase. Spoke with patient and she is [facility] setting up the home oxygen. Advised we would bring oxygen to her beside for discharge to home. ETA 2 hours

## 2022-09-22 NOTE — PROGRESS NOTES
Care Management Discharge Note    Discharge Date: 09/22/2022       Discharge Disposition: Home with HC    Discharge Services: PT/RN    Discharge DME:  O2    Discharge Transportation: family or friend will provide    Private pay costs discussed: Not applicable    PAS Confirmation Code:    Patient/family educated on Medicare website which has current facility and service quality ratings:      Education Provided on the Discharge Plan:    Persons Notified of Discharge Plans: pt, daughter, HC  Patient/Family in Agreement with the Plan: yes    Handoff Referral Completed: Yes    Additional Information:  3:24 PM  Pt medically ready to discharge home with [facility] (PT/RN).  [facility] faxed HC orders.  Family will transport home.  [facility] updated pt and daughter.  Daughter Hubert can provide 24hr assist/cares at home.        MARBELLA Steele

## 2022-09-22 NOTE — PROGRESS NOTES
09/22/22 0950   Quick Adds   Type of Visit Initial PT Evaluation   Living Environment   People in Home child(haritha), adult;grandchild(haritha)  (daughter, son-in-law, granddaughter and grandson)   Current Living Arrangements house   Home Accessibility stairs to enter home   Number of Stairs, Main Entrance 6   Stair Railings, Main Entrance railings safe and in good condition   Living Environment Comments Pt reports that she gets help from her family when walking around and completing stairs   Self-Care   Equipment Currently Used at Home walker, rolling   General Information   Onset of Illness/Injury or Date of Surgery 09/16/22   Referring Physician Thom Ruff MD   Patient/Family Therapy Goals Statement (PT) to go home   Pertinent History of Current Problem (include personal factors and/or comorbidities that impact the POC) COVID+   Existing Precautions/Restrictions fall   Weight-Bearing Status - LLE full weight-bearing   Weight-Bearing Status - RLE full weight-bearing   Bed Mobility   Bed Mobility supine-sit;sit-supine;scooting/bridging   Scooting/Bridging Blount (Bed Mobility) supervision   Supine-Sit Blount (Bed Mobility) contact guard   Sit-Supine Blount (Bed Mobility) contact guard   Assistive Device (Bed Mobility) bed rails   Comment, (Bed Mobility) SBA with bed mobility and scooting in bed. CGA with supine<>sit transfers with BLE.   Transfers   Transfers sit-stand transfer   Comment, (Transfers) CGA with sit<>stand transfers with FWW. Verbal cues for safety and safe hand placement.   Sit-Stand Transfer   Sit-Stand Blount (Transfers) verbal cues;contact guard   Assistive Device (Sit-Stand Transfers) walker, front-wheeled   Comment, (Sit-Stand Transfer) CGA with FWW for sit<>stand transfers. Verbal cues for safety and safe hand placement.   Gait/Stairs (Locomotion)   Blount Level (Gait) verbal cues;contact guard   Assistive Device (Gait) walker, front-wheeled   Distance in  Feet (Required for LE Total Joints) 10   Pattern (Gait) step-to   Deviations/Abnormal Patterns (Gait) base of support, narrow;flaquito decreased;gait speed decreased   Comment, (Gait/Stairs) Pt ambulates with CGA and FWW. Verbal cues for safety. Pt ambulates with flexed forward posture. Pt declined practicing stairs reporting that she would get too fatigued and that she already gets help at home with stairs and does not have any concerns.   Clinical Impression   Criteria for Skilled Therapeutic Intervention Yes, treatment indicated   PT Diagnosis (PT) impaired functional mobility   Influenced by the following impairments weakness   Functional limitations due to impairments transfers, ambulation, stairs   Clinical Presentation (PT Evaluation Complexity) Stable/Uncomplicated   Clinical Presentation Rationale Pt presents as medically diagnosed   Clinical Decision Making (Complexity) low complexity   Planned Therapy Interventions (PT) balance training;bed mobility training;gait training;joint mobilization;patient/family education;ROM (range of motion);stair training;strengthening;stretching;transfer training   Anticipated Equipment Needs at Discharge (PT) walker, rolling   Risk & Benefits of therapy have been explained patient   PT Discharge Planning   PT Discharge Recommendation (DC Rec) (S)  home with assist;home with home care physical therapy;Transitional Care Facility   PT Rationale for DC Rec Pt reports good home set up and good home support. Pt reports that she is able to get assistance from family when ambulating and completing the stairs. Pt ambulates with CGA for safety. Fall risk. Pt would benefit from continued physical therapy to improve functional mobility. Pt would benefit from 24 hour assist for safety. If pt is unable to get 24 hour assist at home, recommend TCU for safety and to improve functional mobility.   Physical Therapy Goals   PT Frequency Daily   PT Predicted Duration/Target Date for Goal  Attainment 09/27/22   PT Goals Transfers;Gait;Stairs   PT: Transfers Supervision/stand-by assist;Sit to/from stand;Assistive device  (FWW)   PT: Gait Supervision/stand-by assist;Assistive device;Rolling walker;50 feet  (FWW)   PT: Stairs Supervision/stand-by assist;Assistive device;1 stair;Rail on both sides  (FWW)     Venita Ruff, PT, DPT

## 2022-09-22 NOTE — PLAN OF CARE
Physical Therapy Discharge Summary    Reason for therapy discharge:    Discharged to home with home therapy.    Progress towards therapy goal(s). See goals on Care Plan in [facility] electronic health record for goal details.  Goals not met.  Barriers to achieving goals:   discharge from facility and discharge on same date as initial evaluation.    Therapy recommendation(s):    Continued therapy is recommended.  Rationale/Recommendations:  continued PT with home PT to improve functional mobility.  Continue home exercise program.

## 2022-09-22 NOTE — PLAN OF CARE
Patient feeling well enough to discharge home. Went over AVS and home oxygen equipment/set up and instructions with patient and daughter. They are aware of home care and home RN to recheck INR and pt is aware to follow up with PCP. Pt left to home in care of family.

## 2022-09-22 NOTE — PLAN OF CARE
Problem: Plan of Care - These are the overarching goals to be used throughout the patient stay.    Goal: Optimal Comfort and Wellbeing  Outcome: Ongoing, Progressing  Intervention: Monitor Pain and Promote Comfort  Recent Flowsheet Documentation  Taken 9/22/2022 0420 by Jaida Cordoba RN  Pain Management Interventions:   medication (see MAR)   rest   Goal Outcome Evaluation:        A&O X4, Denies pain, SOB, nausea or chest tightness. Is on 1L O2 for comfort, external catheter at night, takes pills in apple sauce. Calls appropriately. Bed alarms for safety. Expected discharge this PM.

## 2022-09-22 NOTE — PROGRESS NOTES
I certify that this patient, Alva Dumont has been under my care (or a nurse practitioner or physican's assistant working with me). This is the face-to-face encounter for oxygen medical necessity.      Alva Dumont is now in a chronic stable state and continues to require supplemental oxygen. Patient has continued oxygen desaturation due to COVID-19 U07.1.    Alternative treatment(s) tried or considered and deemed clinically infective for treatment of COVID-19 U07.1 include nebulizers and pulmonary rehab.  If portability is ordered, is the patient mobile within the home? yes    **Patients who qualify for home O2 coverage under the CMS guidelines require ABG tests or O2 sat readings obtained closest to, but no earlier than 2 days prior to the discharge, as evidence of the need for home oxygen therapy. Testing must be performed while patient is in the chronic stable state. See notes for O2 sats.**    Home O2 trial showed need for 3 LPM O2 by NC with activity and 1 LPM O2 at rest/sleep.    Chetna Conklin MD, PGY-3  [facility]  September 22, 2022

## 2022-09-22 NOTE — PLAN OF CARE
Occupational Therapy: Orders received. Chart reviewed and discussed with care team.? Occupational Therapy evaluation attempted 2x but pt declining both attempts. Today reporting being tired from PT eval and having no concerns with her discharge home scheduled today.?Pt reports daughter is available 24/7 to assist with ADLs as needed. Defer discharge recommendations to physical therapy.? Will complete orders.

## 2022-09-22 NOTE — DISCHARGE SUMMARY
[facility]  Discharge Summary - Medicine & Pediatrics       Date of Admission:  9/16/2022  Date of Discharge:  9/22/2022  Discharging Provider: Hubert Ruff MD  Discharge Service: Hospitalist Service    Discharge Diagnoses   Principal Problem:    Pneumonia due to 2019 novel coronavirus (9/22/2022)  Active Problems:    Atrial fibrillation with RVR (H) (9/16/2022)    CHF (congestive heart failure) (H) (9/16/2022)    HTN (hypertension) (9/16/2022)    Follow-ups Needed After Discharge   INR in 2 days    No results to follow up on    Discharge Disposition   Discharged to home  Condition at discharge: Stable    Hospital Course   Alva Dumont is a 89 year old female admitted on 9/16/2022. She has a history of Afib on coumadin, HFpEF, Hypertrophic obstructive cardiomyopathy, CVA, HTN, CKD3 and is admitted for covid pneumonia.      Viral Pneumonia secondary to COVID-19 infection  Symptoms started 9/11/22. Treated with remdesivir and dexamethasone while admitted. Discharged with home oxygen, requiring 3 LPM with activity and 1 LPM with rest. Discharged with Mucinex and tessalon.    HTN, a fib, CHF: losartan and hydralazine stopped during admission due to hypotension. PTA metoprolol, diltiazem, bumex continued. Recommend recheck of BP at PCP visit.      Atrial fibrillation with supratherapeutic INR: INR supratherapeutic on admission and has required smaller doses of warfarin that PTA. Recommended holding 9/22/22 dose of warfarin and taking warfarin 2.5 mg on 9/23/22. Recommend INR check 9/24/22.    Consultations This Hospital Stay   PHARMACY TO DOSE WARFARIN  CARE MANAGEMENT / SOCIAL WORK IP CONSULT  PHYSICAL THERAPY ADULT IP CONSULT  OCCUPATIONAL THERAPY ADULT IP CONSULT    Code Status   No CPR- Do NOT Intubate       The patient was discussed with Dr. Hubert Ruff, who agrees with the assessment and plan above.    Chetna Conklin MD, PGY-3  [facility]  [facility]  [address] [address]  Phone: [phone]  Fax: [phone]  ______________________________________________________________________    Physical Exam   Vital Signs: Temp: 97.9  F (36.6  C) Temp src: Oral BP: 133/64 Pulse: 95   Resp: 16 SpO2: 94 % O2 Device: Nasal cannula Oxygen Delivery: 1.5 LPM  Weight: 116 lbs 6.45 oz  General: alert, appears comfortable, no acute distress  HEENT: atraumatic, conjunctiva clear without erythema, EOM's intact, no nasal discharge, MMM  Neck: supple  Cardiac: normal rate and irregular rhythm with no murmurs or extra sounds  Resp: lungs with end-expiratory wheezes scattered throughout and LLL crackles, no increased work of breathing  Abdomen: soft, non-tender to palpation, no masses  Extremities: no peripheral edema  Skin: no rashes or suspicious legions on exposed skin  Neuro: CN's grossly intact  Psych: affect congruent with mood      Primary Care Physician   Martin Stauffer    Discharge Orders      Home Care Referral      Reason for your hospital stay    COVID pneumonia     Follow-up and recommended labs and tests     Follow up with primary care provider, Martin Stauffer, within 7 days for hospital follow- up.  No follow up labs or test are needed.     Activity    Your activity upon discharge: activity as tolerated     Home Oxygen Order for DME - ONLY FOR DME    I, the undersigned, certify that the above prescribed supplies are medically necessary for this patient and is both reasonable and necessary in reference to accepted standards of medical and necessary in reference to accepted standards of medical practice in the treatment of this patient's condition and is not prescribed as a convenience.      Regular Diet Adult       Significant Results and Procedures   Most Recent 3 CBC's:Recent Labs   Lab Test 09/22/22  0705 09/20/22  1349 09/20/22  0933   WBC 10.3 10.1 8.3   HGB 12.2 12.7 13.1   MCV 82 84 85    229 176      Most Recent 3 BMP's:Recent Labs   Lab Test 09/22/22  1357 09/22/22  0824 09/22/22  0705 09/21/22  1404 09/21/22  1224 09/20/22  1244 09/20/22  0933   NA  --   --  134*  --  133*  --  136   POTASSIUM  --   --  3.6  --  3.8  --  3.9   CHLORIDE  --   --  90*  --  88*  --  92*   CO2  --   --  33*  --  27  --  27   BUN  --   --  59*  --  56*  --  41*   CR  --   --  1.09  --  1.32*  --  1.10   ANIONGAP  --   --  11  --  18  --  17   YADY  --   --  8.7  --  8.5  --  8.7   * 207* 188*   < > 497*   < > 336*    < > = values in this interval not displayed.   ,   Results for orders placed or performed during the hospital encounter of 09/16/22   XR Chest Port 1 View    Narrative    EXAM: XR CHEST PORT 1 VIEW  LOCATION: [facility]  DATE/TIME: 9/16/2022 7:14 PM    INDICATION: cough  COMPARISON: 05/20/2021      Impression    IMPRESSION: Increasing cardiomegaly. Increasing prominence of central pulmonary vasculature with mild interstitial edema pattern. Small to moderate right pleural effusion and trace left effusion. No pneumothorax. No acute bony abnormality.       Discharge Medications   Current Discharge Medication List      START taking these medications    Details   benzonatate (TESSALON) 100 MG capsule Take 1 capsule (100 mg) by mouth 3 times daily as needed for cough  Qty: 30 capsule, Refills: 0    Associated Diagnoses: Infection due to 2019 novel coronavirus      guaiFENesin (MUCINEX) 600 MG 12 hr tablet Take 1 tablet (600 mg) by mouth 2 times daily  Qty: 14 tablet, Refills: 0    Associated Diagnoses: Infection due to 2019 novel coronavirus         CONTINUE these medications which have CHANGED    Details   warfarin ANTICOAGULANT (COUMADIN) 2.5 MG tablet Take 1 tablet (2.5 mg) by mouth At Bedtime    Associated Diagnoses: Atrial fibrillation with RVR (H)         CONTINUE these medications which have NOT CHANGED    Details   acetaminophen (TYLENOL) 500 MG tablet Take 500 mg by mouth At  Bedtime      atorvastatin (LIPITOR) 20 MG tablet Take 20 mg by mouth daily (with lunch)      betamethasone dipropionate (DIPROSONE) 0.05 % external cream Apply topically 2 times daily as needed      !! bumetanide (BUMEX) 1 MG tablet Take 2 mg by mouth daily before breakfast      !! bumetanide (BUMEX) 1 MG tablet Take 1 mg by mouth At Bedtime      calcium carbonate 500 mg (elemental) (OSCAL 500) 1250 (500 Ca) MG TABS tablet Take 1 tablet by mouth 3 times daily      cephALEXin (KEFLEX) 250 MG capsule Take 250 mg by mouth At Bedtime      cholecalciferol 25 MCG (1000 UT) TABS Take 1,000 Units by mouth At Bedtime      diltiazem ER (DILT-XR) 180 MG 24 hr capsule Take 180 mg by mouth daily      diphenhydrAMINE (BENADRYL) 25 MG tablet Take 0.5 tablets (12.5 mg) by mouth every 6 hours as needed for itching or allergies    Associated Diagnoses: Adverse effect of drug, sequela      estradiol (ESTRACE) 0.1 MG/GM vaginal cream Place 1 g vaginally as needed      famotidine (PEPCID) 20 MG tablet Take 20 mg by mouth 2 times daily      metoprolol tartrate (LOPRESSOR) 50 MG tablet Take 75 mg by mouth 2 times daily      Multiple Vitamins-Minerals (PRESERVISION AREDS 2+MULTI VIT) CAPS Take 1 capsule by mouth daily      senna-docusate (SENOKOT-S/PERICOLACE) 8.6-50 MG tablet Take 1 tablet by mouth At Bedtime      tamsulosin (FLOMAX) 0.4 MG capsule Take 1 capsule (0.4 mg) by mouth At Bedtime  Qty: 30 capsule, Refills: 0    Associated Diagnoses: Urinary retention      vitamin C (ASCORBIC ACID) 1000 MG TABS Take 1,000 mg by mouth 3 times daily       !! - Potential duplicate medications found. Please discuss with provider.      STOP taking these medications       hydrALAZINE (APRESOLINE) 25 MG tablet Comments:   Reason for Stopping:         losartan (COZAAR) 100 MG tablet Comments:   Reason for Stopping:             Allergies   Allergies   Allergen Reactions     Cefprozil Shortness Of Breath     Oxycodone-Acetaminophen Itching      "Penicillins Nausea and Rash     Has had different cephalosporins in past , tolerates Augmentin ok if benadryl is given together (last given 12/2018)  Tolerated Zosyn 6/2019.   Has had different cephalosporins in past       Sulfa Drugs Hives and Other (See Comments)     Other reaction(s): Edema, Other (See Comments)  Swelling in hands and feet    Swelling in hands and feet  Other Reaction(s): Edema  Swelling in hands and feet       Ciprofloxacin Hives     Clonidine Other (See Comments)     Extreme Fatigue  Extreme Fatigue       Codeine Other (See Comments)     \"felt wired\"    \"felt wired\"       Levofloxacin Other (See Comments)     agitation       Azithromycin Nausea and Diarrhea     Cefaclor      Hydrochlorothiazide Nausea     Nitrofurantoin      Oxycodone Itching     Spironolactone Other (See Comments)     Patient can't remember what happens     Zestril [Lisinopril] Other (See Comments)     Patient can not remember what happens     Amoxicillin Hives and Rash     Has had different cephalosporins in past     Aspirin Unknown and Other (See Comments)     Other reaction(s): Other (See Comments)  Contraindication with Coumadin Therapy    Contraindication with Coumadin Therapy  Contraindication with Coumadin Therapy       "

## 2022-09-23 ENCOUNTER — PATIENT OUTREACH (OUTPATIENT)
Dept: CARE COORDINATION | Facility: CLINIC | Age: 87
End: 2022-09-23

## 2022-09-23 NOTE — PROGRESS NOTES
Care Management had received call from [facility] that they no longer able to accept pt.  Additional referrals sent and received call from [facility], [facility] and they are able to accept home care referral.  Updated pt's daughter, Hubert by phone.  GIN Barrientos  [facility]  9/23/2022   2:16 PM

## 2022-09-23 NOTE — PROGRESS NOTES
"Clinic Care Coordination Contact  [facility]: Post-Discharge Note  SITUATION                                                      Admission:    Admission Date: 09/16/22   Reason for Admission: Pneumonia due to 2019 novel coronavirus  Discharge:   Discharge Date: 09/22/22  Discharge Diagnosis: Pneumonia due to 2019 novel coronavirus, atrial fibrillation with RVR, CHF, HTN    BACKGROUND                                                      Per hospital discharge summary and inpatient provider notes:    Alva Dumont is a 89 year old female who has a history of Afib on coumadin, CHF, Hypertrophic obstructive cardiomyopathy, CVA, HTN, CKD3 and is presents with shortness of breath in setting of positive home covid-19 testing.      Patient states that she lives with her daughter and son-in-law.  Her son-in-law got COVID.  Patient subsequently developed fevers, cough, shortness of breath, and body aches 5 days ago.  She had a positive at home COVID test.  Today, she reports new onset diarrhea and worsening of her shortness of breath.  She reports her shortness of breath got so significant that she called EMS to take her to the hospital.  She declines headache, sore throat, chest pain, abdominal pain, dysuria, paresthesias.     Patient states she does not have any more swelling that she does at baseline.  She normally wears compression socks at home.  She is not on oxygen at baseline.    ASSESSMENT      Discharge Assessment  How are you doing now that you are home?: \"She's doing good, glad to be back in her comfy place\"  How are your symptoms? (Red Flag symptoms escalate to triage hotline per guidelines): Improved  Do you feel your condition is stable enough to be safe at home until your provider visit?: Yes  Does the patient have their discharge instructions? : Yes  Does the patient have questions regarding their discharge instructions? : No  Were you started on any new medications or were there changes to any of " your previous medications? : Yes  Does the patient have all of their medications?: Yes  Do you have questions regarding any of your medications? : Yes (see comment) (They are wondering if she should stop taking the hydralyzine. Per AVS they are instructing her to stop taking it.)  Do you have all of your needed medical supplies or equipment (DME)?  (i.e. oxygen tank, CPAP, cane, etc.): Yes  Discharge follow-up appointment scheduled within 14 calendar days? : No    Post-op ([facility] Only)  If the patient had a surgery or procedure, do they have any questions for a nurse?: No    PLAN                                                      Outpatient Plan:      Follow up with primary care provider, Martin Stauffer, within 7 days for hospital follow- up.  No follow up labs or test are needed.    No future appointments.      For any urgent concerns, please contact our 24 hour nurse triage line: [phone] ([phone])       Angélica Gonzalez  Community Health Worker  [facility]  Ph: [phone]

## 2022-10-12 ENCOUNTER — HOSPITAL ENCOUNTER (INPATIENT)
Facility: CLINIC | Age: 87
LOS: 3 days | Discharge: HOME | DRG: 690 | End: 2022-10-15
Attending: STUDENT IN AN ORGANIZED HEALTH CARE EDUCATION/TRAINING PROGRAM | Admitting: HOSPITALIST
Payer: MEDICARE

## 2022-10-12 DIAGNOSIS — A49.8 CITROBACTER INFECTION: Primary | ICD-10-CM

## 2022-10-12 DIAGNOSIS — N30.00 ACUTE CYSTITIS WITHOUT HEMATURIA: ICD-10-CM

## 2022-10-12 DIAGNOSIS — I48.0 PAROXYSMAL ATRIAL FIBRILLATION (H): ICD-10-CM

## 2022-10-12 DIAGNOSIS — I50.32 CHRONIC HEART FAILURE WITH PRESERVED EJECTION FRACTION (H): ICD-10-CM

## 2022-10-12 DIAGNOSIS — U07.1 INFECTION DUE TO 2019 NOVEL CORONAVIRUS: ICD-10-CM

## 2022-10-12 LAB
ALBUMIN UR-MCNC: NEGATIVE MG/DL
ANION GAP SERPL CALCULATED.3IONS-SCNC: 13 MMOL/L (ref 5–18)
APPEARANCE UR: ABNORMAL
BACTERIA #/AREA URNS HPF: ABNORMAL /HPF
BILIRUB UR QL STRIP: NEGATIVE
BUN SERPL-MCNC: 22 MG/DL (ref 8–28)
CALCIUM SERPL-MCNC: 8.7 MG/DL (ref 8.5–10.5)
CHLORIDE BLD-SCNC: 100 MMOL/L (ref 98–107)
CO2 SERPL-SCNC: 26 MMOL/L (ref 22–31)
COLOR UR AUTO: ABNORMAL
CREAT SERPL-MCNC: 1.09 MG/DL (ref 0.6–1.1)
ERYTHROCYTE [DISTWIDTH] IN BLOOD BY AUTOMATED COUNT: 14.5 % (ref 10–15)
GFR SERPL CREATININE-BSD FRML MDRD: 47 ML/MIN/1.73M2
GLUCOSE BLD-MCNC: 214 MG/DL (ref 70–125)
GLUCOSE UR STRIP-MCNC: NEGATIVE MG/DL
HCT VFR BLD AUTO: 33.8 % (ref 35–47)
HGB BLD-MCNC: 11.3 G/DL (ref 11.7–15.7)
HGB UR QL STRIP: NEGATIVE
INR PPP: 1.65 (ref 0.85–1.15)
KETONES UR STRIP-MCNC: NEGATIVE MG/DL
LEUKOCYTE ESTERASE UR QL STRIP: ABNORMAL
MCH RBC QN AUTO: 28.5 PG (ref 26.5–33)
MCHC RBC AUTO-ENTMCNC: 33.4 G/DL (ref 31.5–36.5)
MCV RBC AUTO: 85 FL (ref 78–100)
NITRATE UR QL: NEGATIVE
PH UR STRIP: 6 [PH] (ref 5–7)
PLATELET # BLD AUTO: 237 10E3/UL (ref 150–450)
POTASSIUM BLD-SCNC: 3.6 MMOL/L (ref 3.5–5)
RBC # BLD AUTO: 3.97 10E6/UL (ref 3.8–5.2)
RBC URINE: 3 /HPF
SARS-COV-2 RNA RESP QL NAA+PROBE: NEGATIVE
SODIUM SERPL-SCNC: 139 MMOL/L (ref 136–145)
SP GR UR STRIP: 1.01 (ref 1–1.03)
SQUAMOUS EPITHELIAL: <1 /HPF
TRANSITIONAL EPI: <1 /HPF
UROBILINOGEN UR STRIP-MCNC: <2 MG/DL
WBC # BLD AUTO: 4.3 10E3/UL (ref 4–11)
WBC CLUMPS #/AREA URNS HPF: PRESENT /HPF
WBC URINE: >182 /HPF

## 2022-10-12 PROCEDURE — 99285 EMERGENCY DEPT VISIT HI MDM: CPT | Mod: 25

## 2022-10-12 PROCEDURE — 85027 COMPLETE CBC AUTOMATED: CPT | Performed by: PHYSICIAN ASSISTANT

## 2022-10-12 PROCEDURE — 250N000011 HC RX IP 250 OP 636: Performed by: PHYSICIAN ASSISTANT

## 2022-10-12 PROCEDURE — 36415 COLL VENOUS BLD VENIPUNCTURE: CPT | Performed by: HOSPITALIST

## 2022-10-12 PROCEDURE — 120N000001 HC R&B MED SURG/OB

## 2022-10-12 PROCEDURE — 80048 BASIC METABOLIC PNL TOTAL CA: CPT | Performed by: PHYSICIAN ASSISTANT

## 2022-10-12 PROCEDURE — 36415 COLL VENOUS BLD VENIPUNCTURE: CPT | Performed by: PHYSICIAN ASSISTANT

## 2022-10-12 PROCEDURE — 81001 URINALYSIS AUTO W/SCOPE: CPT | Performed by: EMERGENCY MEDICINE

## 2022-10-12 PROCEDURE — 99223 1ST HOSP IP/OBS HIGH 75: CPT | Performed by: HOSPITALIST

## 2022-10-12 PROCEDURE — 96365 THER/PROPH/DIAG IV INF INIT: CPT

## 2022-10-12 PROCEDURE — U0003 INFECTIOUS AGENT DETECTION BY NUCLEIC ACID (DNA OR RNA); SEVERE ACUTE RESPIRATORY SYNDROME CORONAVIRUS 2 (SARS-COV-2) (CORONAVIRUS DISEASE [COVID-19]), AMPLIFIED PROBE TECHNIQUE, MAKING USE OF HIGH THROUGHPUT TECHNOLOGIES AS DESCRIBED BY CMS-2020-01-R: HCPCS | Performed by: STUDENT IN AN ORGANIZED HEALTH CARE EDUCATION/TRAINING PROGRAM

## 2022-10-12 PROCEDURE — 87086 URINE CULTURE/COLONY COUNT: CPT | Performed by: EMERGENCY MEDICINE

## 2022-10-12 PROCEDURE — C9803 HOPD COVID-19 SPEC COLLECT: HCPCS

## 2022-10-12 PROCEDURE — 250N000013 HC RX MED GY IP 250 OP 250 PS 637: Performed by: HOSPITALIST

## 2022-10-12 PROCEDURE — 85610 PROTHROMBIN TIME: CPT | Performed by: HOSPITALIST

## 2022-10-12 RX ORDER — ACETAMINOPHEN 650 MG/1
650 SUPPOSITORY RECTAL EVERY 6 HOURS PRN
Status: DISCONTINUED | OUTPATIENT
Start: 2022-10-12 | End: 2022-10-15 | Stop reason: HOSPADM

## 2022-10-12 RX ORDER — ONDANSETRON 2 MG/ML
4 INJECTION INTRAMUSCULAR; INTRAVENOUS EVERY 6 HOURS PRN
Status: DISCONTINUED | OUTPATIENT
Start: 2022-10-12 | End: 2022-10-15 | Stop reason: HOSPADM

## 2022-10-12 RX ORDER — BUMETANIDE 1 MG/1
1 TABLET ORAL AT BEDTIME
Status: DISCONTINUED | OUTPATIENT
Start: 2022-10-12 | End: 2022-10-15 | Stop reason: HOSPADM

## 2022-10-12 RX ORDER — DIPHENHYDRAMINE HCL 25 MG
12.5 TABLET ORAL EVERY 6 HOURS PRN
Status: DISCONTINUED | OUTPATIENT
Start: 2022-10-12 | End: 2022-10-15 | Stop reason: HOSPADM

## 2022-10-12 RX ORDER — WARFARIN SODIUM 1 MG/1
3 TABLET ORAL
Status: COMPLETED | OUTPATIENT
Start: 2022-10-12 | End: 2022-10-12

## 2022-10-12 RX ORDER — BISACODYL 10 MG
10 SUPPOSITORY, RECTAL RECTAL DAILY PRN
Status: DISCONTINUED | OUTPATIENT
Start: 2022-10-12 | End: 2022-10-15 | Stop reason: HOSPADM

## 2022-10-12 RX ORDER — ACETAMINOPHEN 325 MG/1
650 TABLET ORAL EVERY 6 HOURS PRN
Status: DISCONTINUED | OUTPATIENT
Start: 2022-10-12 | End: 2022-10-15 | Stop reason: HOSPADM

## 2022-10-12 RX ORDER — VIT C/E/ZN/COPPR/LUTEIN/ZEAXAN 60 MG-6 MG
1 CAPSULE ORAL EVERY EVENING
Status: DISCONTINUED | OUTPATIENT
Start: 2022-10-12 | End: 2022-10-15 | Stop reason: HOSPADM

## 2022-10-12 RX ORDER — ATORVASTATIN CALCIUM 10 MG/1
20 TABLET, FILM COATED ORAL
Status: DISCONTINUED | OUTPATIENT
Start: 2022-10-13 | End: 2022-10-15 | Stop reason: HOSPADM

## 2022-10-12 RX ORDER — AMOXICILLIN 250 MG
1 CAPSULE ORAL AT BEDTIME
Status: DISCONTINUED | OUTPATIENT
Start: 2022-10-12 | End: 2022-10-15 | Stop reason: HOSPADM

## 2022-10-12 RX ORDER — BENZONATATE 100 MG/1
100 CAPSULE ORAL 3 TIMES DAILY PRN
Status: DISCONTINUED | OUTPATIENT
Start: 2022-10-12 | End: 2022-10-15 | Stop reason: HOSPADM

## 2022-10-12 RX ORDER — MEROPENEM 1 G/1
1 INJECTION, POWDER, FOR SOLUTION INTRAVENOUS EVERY 12 HOURS
Status: DISCONTINUED | OUTPATIENT
Start: 2022-10-13 | End: 2022-10-15

## 2022-10-12 RX ORDER — AMOXICILLIN 250 MG
2 CAPSULE ORAL 2 TIMES DAILY PRN
Status: DISCONTINUED | OUTPATIENT
Start: 2022-10-12 | End: 2022-10-15 | Stop reason: HOSPADM

## 2022-10-12 RX ORDER — ONDANSETRON 4 MG/1
4 TABLET, ORALLY DISINTEGRATING ORAL EVERY 6 HOURS PRN
Status: DISCONTINUED | OUTPATIENT
Start: 2022-10-12 | End: 2022-10-15 | Stop reason: HOSPADM

## 2022-10-12 RX ORDER — VITAMIN B COMPLEX
1000 TABLET ORAL AT BEDTIME
Status: DISCONTINUED | OUTPATIENT
Start: 2022-10-12 | End: 2022-10-15 | Stop reason: HOSPADM

## 2022-10-12 RX ORDER — POLYETHYLENE GLYCOL 3350 17 G/17G
17 POWDER, FOR SOLUTION ORAL DAILY PRN
Status: DISCONTINUED | OUTPATIENT
Start: 2022-10-12 | End: 2022-10-15 | Stop reason: HOSPADM

## 2022-10-12 RX ORDER — METOPROLOL TARTRATE 25 MG/1
75 TABLET, FILM COATED ORAL 2 TIMES DAILY
Status: DISCONTINUED | OUTPATIENT
Start: 2022-10-12 | End: 2022-10-15 | Stop reason: HOSPADM

## 2022-10-12 RX ORDER — CALCIUM CARBONATE 500(1250)
1 TABLET ORAL 3 TIMES DAILY
Status: DISCONTINUED | OUTPATIENT
Start: 2022-10-12 | End: 2022-10-15 | Stop reason: HOSPADM

## 2022-10-12 RX ORDER — GUAIFENESIN 600 MG/1
600 TABLET, EXTENDED RELEASE ORAL 2 TIMES DAILY PRN
Status: DISCONTINUED | OUTPATIENT
Start: 2022-10-12 | End: 2022-10-15 | Stop reason: HOSPADM

## 2022-10-12 RX ORDER — ASCORBIC ACID 500 MG
1000 TABLET ORAL 3 TIMES DAILY
Status: DISCONTINUED | OUTPATIENT
Start: 2022-10-12 | End: 2022-10-15 | Stop reason: HOSPADM

## 2022-10-12 RX ORDER — BUMETANIDE 1 MG/1
2 TABLET ORAL
Status: DISCONTINUED | OUTPATIENT
Start: 2022-10-13 | End: 2022-10-15 | Stop reason: HOSPADM

## 2022-10-12 RX ORDER — LIDOCAINE 40 MG/G
CREAM TOPICAL
Status: DISCONTINUED | OUTPATIENT
Start: 2022-10-12 | End: 2022-10-15 | Stop reason: HOSPADM

## 2022-10-12 RX ORDER — ACETAMINOPHEN 500 MG
500 TABLET ORAL AT BEDTIME
Status: DISCONTINUED | OUTPATIENT
Start: 2022-10-12 | End: 2022-10-15 | Stop reason: HOSPADM

## 2022-10-12 RX ORDER — TAMSULOSIN HYDROCHLORIDE 0.4 MG/1
0.4 CAPSULE ORAL AT BEDTIME
Status: DISCONTINUED | OUTPATIENT
Start: 2022-10-12 | End: 2022-10-15 | Stop reason: HOSPADM

## 2022-10-12 RX ORDER — BETAMETHASONE DIPROPIONATE 0.5 MG/G
CREAM TOPICAL 2 TIMES DAILY PRN
Status: DISCONTINUED | OUTPATIENT
Start: 2022-10-12 | End: 2022-10-15 | Stop reason: HOSPADM

## 2022-10-12 RX ORDER — DILTIAZEM HYDROCHLORIDE 180 MG/1
180 CAPSULE, EXTENDED RELEASE ORAL EVERY EVENING
Status: DISCONTINUED | OUTPATIENT
Start: 2022-10-12 | End: 2022-10-15 | Stop reason: HOSPADM

## 2022-10-12 RX ORDER — ESTRADIOL 0.1 MG/G
1 CREAM VAGINAL AT BEDTIME
Status: DISCONTINUED | OUTPATIENT
Start: 2022-10-12 | End: 2022-10-15 | Stop reason: HOSPADM

## 2022-10-12 RX ORDER — MEROPENEM 500 MG/1
500 INJECTION, POWDER, FOR SOLUTION INTRAVENOUS ONCE
Status: COMPLETED | OUTPATIENT
Start: 2022-10-12 | End: 2022-10-12

## 2022-10-12 RX ORDER — AMOXICILLIN 250 MG
1 CAPSULE ORAL 2 TIMES DAILY PRN
Status: DISCONTINUED | OUTPATIENT
Start: 2022-10-12 | End: 2022-10-15 | Stop reason: HOSPADM

## 2022-10-12 RX ORDER — FAMOTIDINE 20 MG/1
20 TABLET, FILM COATED ORAL DAILY
Status: DISCONTINUED | OUTPATIENT
Start: 2022-10-13 | End: 2022-10-15 | Stop reason: HOSPADM

## 2022-10-12 RX ADMIN — METOPROLOL TARTRATE 75 MG: 25 TABLET, FILM COATED ORAL at 21:18

## 2022-10-12 RX ADMIN — BUMETANIDE 1 MG: 1 TABLET ORAL at 21:18

## 2022-10-12 RX ADMIN — OXYCODONE HYDROCHLORIDE AND ACETAMINOPHEN 1000 MG: 500 TABLET ORAL at 21:17

## 2022-10-12 RX ADMIN — Medication 1 CAPSULE: at 21:17

## 2022-10-12 RX ADMIN — WARFARIN SODIUM 3 MG: 1 TABLET ORAL at 21:23

## 2022-10-12 RX ADMIN — CHOLECALCIFEROL TAB 25 MCG (1000 UNIT) 1000 UNITS: 25 TAB at 21:18

## 2022-10-12 RX ADMIN — MEROPENEM 500 MG: 500 INJECTION, POWDER, FOR SOLUTION INTRAVENOUS at 15:58

## 2022-10-12 RX ADMIN — TAMSULOSIN HYDROCHLORIDE 0.4 MG: 0.4 CAPSULE ORAL at 21:18

## 2022-10-12 RX ADMIN — DILTIAZEM HYDROCHLORIDE 180 MG: 180 CAPSULE, EXTENDED RELEASE ORAL at 21:18

## 2022-10-12 RX ADMIN — ACETAMINOPHEN 500 MG: 500 TABLET ORAL at 21:18

## 2022-10-12 RX ADMIN — CALCIUM 500 MG: 500 TABLET ORAL at 21:18

## 2022-10-12 ASSESSMENT — ACTIVITIES OF DAILY LIVING (ADL)
DRESSING/BATHING_DIFFICULTY: NO
TOILETING_ISSUES: NO
WALKING_OR_CLIMBING_STAIRS: STAIR CLIMBING DIFFICULTY, ASSISTANCE 1 PERSON
CHANGE_IN_FUNCTIONAL_STATUS_SINCE_ONSET_OF_CURRENT_ILLNESS/INJURY: NO
DEPENDENT_IADLS:: CLEANING;COOKING;LAUNDRY;TRANSPORTATION;SHOPPING;MEAL PREPARATION
ADLS_ACUITY_SCORE: 35
FALL_HISTORY_WITHIN_LAST_SIX_MONTHS: NO
WALKING_OR_CLIMBING_STAIRS_DIFFICULTY: YES
ADLS_ACUITY_SCORE: 35
ADLS_ACUITY_SCORE: 30
DIFFICULTY_EATING/SWALLOWING: YES
EATING/SWALLOWING: EATING
DOING_ERRANDS_INDEPENDENTLY_DIFFICULTY: YES
WEAR_GLASSES_OR_BLIND: YES
CONCENTRATING,_REMEMBERING_OR_MAKING_DECISIONS_DIFFICULTY: NO
VISION_MANAGEMENT: GLASSES
ADLS_ACUITY_SCORE: 35
EQUIPMENT_CURRENTLY_USED_AT_HOME: WALKER, ROLLING

## 2022-10-12 NOTE — H&P
[facility]    History and Physical - Hospitalist Service       Date of Admission:  10/12/2022    Assessment & Plan      Alva Dumont is a 89 year old female admitted on 10/12/2022. She has a hx of recurrent UTIs, pAfib, HFpEF, HTN, MDD/anxiety presents with 6-days of urinary frequency, urge, and incomplete bladder emptying consistent with her previous UTIs; Urine Cx from OSH 10/6 demonstrate Citrobacter species with much resistance but with good sensitivities to meropenem, which has been ordered and started. Follow-up urine culture from here. Supportive cares.     Acute Cystitis - Urinary Tract Infection, Complex  Citrobacter UTI  -Presented with urinary frequency, urge, incomplete emptying.  -[facility]H [facility] Urine Cx from 10/6:  Citrobacter species PIPERACILLIN/TAZO 16: S   Citrobacter species CEFEPIME <=1: S   Citrobacter species TOBRAMYCIN >=16: R   Citrobacter species MEROPENEM <=0.25: S     -Urinalysis with reflex culture ordered. Follow-up results.   -Continue IV meropenem and follow-up urine culture speciation/sensitivities to tailor antibiotics.   -Delirium precautions.    -If becomes acutely agitated or develops acute delirium, utilize verbal redirection first, and involve patient's family/contacts if available. Pharmacologic as-needed interventions as second-line, and would only judiciously consider 1:1 sitter and/or restraints if patient becomes a physical danger to self and/or to others/staff or if previous interventions unsuccessful or not effective.     Atrial Fibrillation   Anticoagulated   -Telemetry.  -Optimize potassium and magnesium. Keep K > 4.0, Mag > 2.0.   -Consider RN potassium and magnesium replacement protocols.   -Order home medications including AV-allyson rate control medication.  -Pharmacy to dose warfarin for anticoagulation.   -If any acute sustained rapid rates develop, obtain EKG to evaluate for RVR and initiate cardiac telemetry and update [facility] team.  "    HTN  HFpEF  -Order home medications and as needed apply specified hold parameters.   -Order home diuretics.     MDD/Anxiety  -Order home medications.          Diet:    Cardiac  DVT Prophylaxis: Warfarin  Short Catheter: Not present  Central Lines: None  Cardiac Monitoring: None  Code Status:    DNR    Clinically Significant Risk Factors Present on Admission               # Coagulation Defect: home medication list includes an anticoagulant medication      # Overweight: Estimated body mass index is 25.32 kg/m  as calculated from the following:    Height as of this encounter: 1.448 m (4' 9\").    Weight as of this encounter: 53.1 kg (117 lb).        Disposition Plan      Expected Discharge Date: 10/14/2022                The patient's care was discussed with the Bedside Nurse, Patient and Patient's Family.    Nadeem Zimmer MD  Hospitalist Service  [facility]  Securely message with the Vocera Web Console (learn more here)  Text page via [facility] Paging/Directory         ______________________________________________________________________    Chief Complaint   Urinary tract infection not responding to oral antibiotics    History is obtained from the patient    History of Present Illness   Alva Dumont is a 89 year old female admitted on 10/12/2022. She has a hx of recurrent UTIs, pAfib, HFpEF, HTN, MDD/anxiety presents with 6-days of urinary frequency, urge, and incomplete bladder emptying consistent with her previous UTIs; Urine Cx from OSH 10/6 demonstrate Citrobacter species with much resistance but with good sensitivities to meropenem.    She had been in her usual state of health until 10/6 when she developed urinary symptoms including frequency, urge, incomplete emptying, and \"back pain\" consistent with her previous UTI episodes. She presented to clinic and was prescribed antibiotic. When the culture s/s resulted with resistant citrobacter, she was called today and instructed to come to " an ED. She denies any fever or rigors. Denies sick contacts. Otherwise, there is no endorsement of any fevers, rigors, chest pain or shortness of breath, nausea or vomiting or abdominal pain, changes in bowel movements/pattern, focal weakness, or any other new and associated symptoms at this time. 14 point review of systems performed without any other pertinent positives at this time. Her daughter is at bedside.     The social history, family history, and past medical history were directly reviewed. All questions the patient had at this time were answered to verbalized and stated satisfaction and understanding. Code status was discussed and the patient confirmed wishes for DNR for this hospitalization.      Review of Systems    The 10 point Review of Systems is negative other than noted in the HPI or here.     Past Medical History    I have reviewed this patient's medical history and updated it with pertinent information if needed.   Past Medical History:   Diagnosis Date     Adjustment reaction with anxiety and depression      Atrial fibrillation with RVR (H)      Cervical spondylosis with radiculopathy      CHF (congestive heart failure) (H)      Chronic back pain      Chronic kidney disease, stage 3 (H)      CVA (cerebral infarction)     Incidental old small infarcts seen on MRI 7/2004     Frequent UTI 7/31/12    had pseudomonas UTI and required caftazidime IV; cystitis cystica on cystoscopy     HTN (hypertension)      Hypertrophic obstructive cardiomyopathy (H)      Hypochloremia      Hypokalemia      Hyponatremia      Hyponatremia      Insomnia      Left wrist fracture 2002     Lumbar spinal stenosis      Macular degeneration      Occlusion and stenosis of carotid artery without mention of cerebral infarction 6/11/2008    Right carotid on US-moderate     Osteoporosis      Pure hypercholesterolemia      Sciatica      Senile cataract        Past Surgical History   I have reviewed this patient's surgical history  and updated it with pertinent information if needed.  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  Social History     Tobacco Use     Smoking status: Never     Smokeless tobacco: Never   Substance Use Topics     Alcohol use: No     Drug use: No       Family History   I have reviewed this patient's family history and updated it with pertinent information if needed.  Family History   Problem Relation Age of Onset     Breast Cancer Mother      Hypertension Sister      Alcoholism Brother         and Drug     Cancer Son         pancreatic     Aneurysm Brother      Other - See Comments Brother         renal failure       Prior to Admission Medications   Prior to Admission Medications   Prescriptions Last Dose Informant Patient Reported? Taking?   Multiple Vitamins-Minerals (PRESERVISION AREDS 2+MULTI VIT) CAPS 10/11/2022  Yes Yes   Sig: Take 1 capsule by mouth every evening   acetaminophen (TYLENOL) 500 MG tablet 10/11/2022  Yes Yes   Sig: Take 500 mg by mouth At Bedtime   atorvastatin (LIPITOR) 20 MG tablet 10/12/2022  Yes Yes   Sig: Take 20 mg by mouth daily (with lunch)   benzonatate (TESSALON) 100 MG capsule Unknown  No Yes   Sig: Take 1 capsule (100 mg) by mouth 3 times daily as needed for cough   betamethasone dipropionate (DIPROSONE) 0.05 % external cream Unknown  Yes Yes   Sig: Apply topically 2 times daily as needed   bumetanide (BUMEX) 1 MG tablet 10/12/2022  Yes Yes   Sig: Take 2 mg by mouth daily before breakfast   bumetanide (BUMEX) 1 MG tablet 10/11/2022  Yes Yes   Sig: Take 1 mg by mouth At Bedtime   calcium carbonate 500 mg (elemental) (OSCAL 500) 1250 (500 Ca) MG TABS tablet   Yes No   Sig: Take 1 tablet by mouth 3 times daily    cephALEXin (KEFLEX) 250 MG capsule 10/11/2022  Yes Yes   Sig: Take 250 mg by mouth At Bedtime   cholecalciferol 25 MCG (1000 UT) TABS 10/11/2022  Yes Yes   Sig: Take 1,000 Units by mouth At Bedtime   diltiazem ER (DILT-XR) 180 MG 24 hr capsule 10/11/2022  Yes Yes   Sig: Take 180 mg by mouth every evening   diphenhydrAMINE (BENADRYL) 25 MG tablet Unknown  No Yes   Sig: Take 0.5 tablets (12.5 mg) by mouth every 6 hours as needed for itching or allergies   estradiol (ESTRACE) 0.1 MG/GM vaginal cream Unknown  Yes Yes   Sig: Place 1 g vaginally as needed   famotidine (PEPCID) 20 MG tablet 10/12/2022 at am  Yes Yes   Sig: Take 20 mg by mouth 2 times daily   guaiFENesin (MUCINEX) 600 MG 12 hr tablet Unknown  No Yes   Sig: Take 1 tablet (600 mg) by mouth 2 times daily   Patient taking differently: Take 600 mg by mouth 2 times daily as needed   metoprolol tartrate (LOPRESSOR) 50 MG tablet 10/12/2022 at am  Yes Yes   Sig: Take 75 mg by mouth 2 times daily   senna-docusate (SENOKOT-S/PERICOLACE) 8.6-50 MG tablet 10/11/2022  Yes Yes   Sig: Take 1 tablet by mouth At Bedtime   tamsulosin (FLOMAX) 0.4 MG capsule 10/11/2022  No Yes   Sig: Take 1 capsule (0.4 mg) by mouth At Bedtime   vitamin C (ASCORBIC ACID) 1000 MG TABS 10/12/2022 at am  Yes Yes   Sig: Take 1,000 mg by mouth 3 times daily   warfarin ANTICOAGULANT (COUMADIN) 2.5 MG tablet 10/11/2022  No Yes   Sig: Take 1 tablet (2.5 mg) by mouth At Bedtime      Facility-Administered Medications: None     Allergies   Allergies   Allergen Reactions     Cefprozil Shortness Of Breath     Oxycodone-Acetaminophen Itching     Penicillins Nausea and Rash     Has had different cephalosporins in past , tolerates Augmentin ok if benadryl is given together (last given 12/2018)  Tolerated Zosyn 6/2019.   Has had different cephalosporins in past       Sulfa Drugs Hives and Other (See Comments)     Other reaction(s): Edema, Other (See Comments)  Swelling in hands and feet    Swelling in hands  "and feet  Other Reaction(s): Edema  Swelling in hands and feet       Ciprofloxacin Hives     Clonidine Other (See Comments)     Extreme Fatigue  Extreme Fatigue       Codeine Other (See Comments)     \"felt wired\"    \"felt wired\"       Levofloxacin Other (See Comments)     agitation       Azithromycin Nausea and Diarrhea     Cefaclor      Hydrochlorothiazide Nausea     Nitrofurantoin      Oxycodone Itching     Spironolactone Other (See Comments)     Patient can't remember what happens     Zestril [Lisinopril] Other (See Comments)     Patient can not remember what happens     Amoxicillin Hives and Rash     Has had different cephalosporins in past     Aspirin Unknown and Other (See Comments)     Other reaction(s): Other (See Comments)  Contraindication with Coumadin Therapy    Contraindication with Coumadin Therapy  Contraindication with Coumadin Therapy         Physical Exam   Vital Signs: Temp: 97.7  F (36.5  C) Temp src: Oral BP: 132/62 Pulse: 110   Resp: 20 SpO2: 94 %      Weight: 117 lbs 0 oz    GENERAL:  Alert, appears comfortable, in no acute distress, appears stated age   HEAD:  Normocephalic, without obvious abnormality, atraumatic   EYES:  PERRL, conjunctiva/corneas clear, no scleral icterus, EOM's intact   NOSE: Nares normal, septum midline, mucosa normal, no drainage   THROAT: Lips, mucosa, and tongue normal; teeth and gums normal, mouth moist   NECK: Supple, symmetrical, trachea midline   BACK:   Symmetric, no curvature, ROM normal   LUNGS:   Clear to auscultation bilaterally, no rales, rhonchi, or wheezing, symmetric chest rise on inhalation, respirations unlabored   CHEST WALL:  No tenderness or deformity   HEART:  Regular rate and rhythm, S1 and S2 normal, no murmur, rub, or gallop    ABDOMEN:   Soft, non-tender, bowel sounds active all four quadrants, no masses, no organomegaly, no rebound or guarding   EXTREMITIES: Extremities normal, atraumatic, no cyanosis or edema    SKIN: Dry to touch, no " exanthems in the visualized areas   NEURO: Alert, oriented x 4, moves all four extremities freely/spontaneously   PSYCH: Cooperative, behavior is appropriate      Data   Data reviewed today: I reviewed all medications, new labs and imaging results over the last 24 hours. I personally reviewed OSH Urine culture 10/6 sensitivities and speciation.    Recent Labs   Lab 10/12/22  1531   WBC 4.3   HGB 11.3*   MCV 85         POTASSIUM 3.6   CHLORIDE 100   CO2 26   BUN 22   CR 1.09   ANIONGAP 13   YADY 8.7   *     No results found for this or any previous visit (from the past 24 hour(s)).

## 2022-10-12 NOTE — PHARMACY-ADMISSION MEDICATION HISTORY
Pharmacy Note - Admission Medication History    Pertinent Provider Information:     ______________________________________________________________________    Prior To Admission (PTA) med list completed and updated in EMR.       PTA Med List   Medication Sig Last Dose     acetaminophen (TYLENOL) 500 MG tablet Take 500 mg by mouth At Bedtime 10/11/2022     atorvastatin (LIPITOR) 20 MG tablet Take 20 mg by mouth daily (with lunch) 10/12/2022     benzonatate (TESSALON) 100 MG capsule Take 1 capsule (100 mg) by mouth 3 times daily as needed for cough Unknown     betamethasone dipropionate (DIPROSONE) 0.05 % external cream Apply topically 2 times daily as needed Unknown     bumetanide (BUMEX) 1 MG tablet Take 2 mg by mouth daily before breakfast 10/12/2022     bumetanide (BUMEX) 1 MG tablet Take 1 mg by mouth At Bedtime 10/11/2022     cephALEXin (KEFLEX) 250 MG capsule Take 250 mg by mouth At Bedtime 10/11/2022     cholecalciferol 25 MCG (1000 UT) TABS Take 1,000 Units by mouth At Bedtime 10/11/2022     diltiazem ER (DILT-XR) 180 MG 24 hr capsule Take 180 mg by mouth every evening 10/11/2022     diphenhydrAMINE (BENADRYL) 25 MG tablet Take 0.5 tablets (12.5 mg) by mouth every 6 hours as needed for itching or allergies Unknown     estradiol (ESTRACE) 0.1 MG/GM vaginal cream Place 1 g vaginally as needed Unknown     famotidine (PEPCID) 20 MG tablet Take 20 mg by mouth 2 times daily 10/12/2022 at am     guaiFENesin (MUCINEX) 600 MG 12 hr tablet Take 1 tablet (600 mg) by mouth 2 times daily (Patient taking differently: Take 600 mg by mouth 2 times daily as needed) Unknown     metoprolol tartrate (LOPRESSOR) 50 MG tablet Take 75 mg by mouth 2 times daily 10/12/2022 at am     Multiple Vitamins-Minerals (PRESERVISION AREDS 2+MULTI VIT) CAPS Take 1 capsule by mouth every evening 10/11/2022     senna-docusate (SENOKOT-S/PERICOLACE) 8.6-50 MG tablet Take 1 tablet by mouth At Bedtime 10/11/2022     tamsulosin (FLOMAX) 0.4 MG capsule  Take 1 capsule (0.4 mg) by mouth At Bedtime 10/11/2022     vitamin C (ASCORBIC ACID) 1000 MG TABS Take 1,000 mg by mouth 3 times daily 10/12/2022 at am     warfarin ANTICOAGULANT (COUMADIN) 2.5 MG tablet Take 1 tablet (2.5 mg) by mouth At Bedtime 10/11/2022       Information source(s): Patient  Method of interview communication: in-person    Summary of Changes to PTA Med List  New:   Discontinued:  Changed:    Patient was asked about OTC/herbal products specifically.  PTA med list reflects this.    In the past week, patient estimated taking medication this percent of the time:  greater than 90%.    Allergies were reviewed, assessed, and updated with the patient.      Patient does not use any multi-dose medications prior to admission.    The information provided in this note is only as accurate as the sources available at the time of the update(s).    Thank you for the opportunity to participate in the care of this patient.    Regina Horvath PharmD  10/12/2022 6:08 PM

## 2022-10-12 NOTE — ED PROVIDER NOTES
Emergency Department Encounter         FINAL IMPRESSION:  UTI        ED COURSE AND MEDICAL DECISION MAKING       ED Course as of 10/12/22 1658   Wed Oct 12, 2022   1535 89-year-old with a longstanding history of UTIs on Keflex prophylactically, here with dysuria and low back pain with a urine culture that is essentially pan resistant to all oral antibiotics.  When I saw patient, labs have been ordered per the triage protocol as well as IV meropenem per discussion with the PA in triage with pharmacy.  Patient otherwise hemodynamically stable.  Plan for admission.     -Labs overall reassuring.  Vital stable.  No signs of septicemia.  Admitted to the hospitalist service       3:48 PM I met with the patient for the initial interview and physical examination. Discussed plan for treatment and workup in the ED. PPE: Provider wore gloves, and paper mask.    4:46 PM PA student discussed the case with hospitalist, Dr Zimmer, who accepts the patient      Medical Decision Making    Supplemental history from: Family Member    External Record(s) Reviewed: N/A    Differential Diagnosis: See MDM charting for differential considered.     I performed an independent interpretation of the: N/A    Discussed with radiology regarding test interpretation: N/A    Discussion of management with another provider: N/A    The following testing was considered but ultimately not selected: None    I considered prescription management with: N/A    The patient's care impacted: None    Consideration of Admission/Observation: Yes: admit    Care significantly affected by Social Determinants of Health including: N/A             At the conclusion of the encounter I discussed the results of all the tests and the disposition. The questions were answered. The patient or family acknowledged understanding and was agreeable with the care plan.                  MEDICATIONS GIVEN IN THE EMERGENCY DEPARTMENT:  Medications   meropenem (MERREM) 500 mg vial to  attach to  mL bag for ADULTS or 25 mL bag for PEDS (500 mg Intravenous New Bag 10/12/22 1534)       NEW PRESCRIPTIONS STARTED AT TODAY'S ED VISIT:  New Prescriptions    No medications on file       HPI     Patient information obtained from: the patient and her daughter    Use of Interpretor: N/A     Alva Dumont is a 89 year old female with a pertinent history of recurrent UTI, atrial fibrillation, congestive heart failure, hypertension, and anxiety who presents to this ED via walk in for evaluation of urinary frequency.     The patient reports the onset of low back pain, difficulty urinating, and urinary frequency four days ago (10/08). She rates her back pain as a 4/10. She states that these are the symptoms that she gets when she has a UTI, so she dropped off a urine sample at her primary clinic this morning. She was called by her primary care provider this afternoon who referred her to the ED as her urine analysis showed that it was resistant to several antibiotics. The patient notes that she had COVID-19 nine days ago. The patient denies abdominal pain, dysuria, incontinence, fever, chills, and any other symptoms or complaints at this time.     REVIEW OF SYSTEMS:  Review of Systems   Constitutional: Negative for fever, malaise  HEENT: Negative runny nose, sore throat, ear pain, neck pain  Respiratory: Negative for shortness of breath, cough, congestion  Cardiovascular: Negative for chest pain, leg edema  Gastrointestinal: Negative for abdominal distention, abdominal pain, constipation, vomiting, nausea, diarrhea  Genitourinary: Negative for dysuria and hematuria. Positive for difficulty urinating, frequency  Integument: Negative for rash, skin breakdown  Neurological: Negative for paresthesias, weakness, headache.  Musculoskeletal: Negative for joint pain, joint swelling. Positive for low back pain      All other systems reviewed and are negative.          MEDICAL HISTORY     Past Medical History:    Diagnosis Date     Adjustment reaction with anxiety and depression      Atrial fibrillation with RVR (H)      Cervical spondylosis with radiculopathy      CHF (congestive heart failure) (H)      Chronic back pain      Chronic kidney disease, stage 3 (H)      CVA (cerebral infarction)      Frequent UTI 7/31/12     HTN (hypertension)      Hypertrophic obstructive cardiomyopathy (H)      Hypochloremia      Hypokalemia      Hyponatremia      Hyponatremia      Insomnia      Left wrist fracture 2002     Lumbar spinal stenosis      Macular degeneration      Occlusion and stenosis of carotid artery without mention of cerebral infarction 6/11/2008     Osteoporosis      Pure hypercholesterolemia      Sciatica      Senile cataract        Past Surgical History:   Procedure Laterality Date     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     FRACTURE SURGERY Left     wrist     LUMBAR LAMINECTOMY  12/2009    fusion     VAGINAL DELIVERY      x4       Social History     Tobacco Use     Smoking status: Never     Smokeless tobacco: Never   Substance Use Topics     Alcohol use: No     Drug use: No       acetaminophen (TYLENOL) 500 MG tablet  atorvastatin (LIPITOR) 20 MG tablet  benzonatate (TESSALON) 100 MG capsule  betamethasone dipropionate (DIPROSONE) 0.05 % external cream  bumetanide (BUMEX) 1 MG tablet  bumetanide (BUMEX) 1 MG tablet  calcium carbonate 500 mg (elemental) (OSCAL 500) 1250 (500 Ca) MG TABS tablet  cephALEXin (KEFLEX) 250 MG capsule  cholecalciferol 25 MCG (1000 UT) TABS  diltiazem ER (DILT-XR) 180 MG 24 hr capsule  diphenhydrAMINE (BENADRYL) 25 MG tablet  estradiol (ESTRACE) 0.1 MG/GM vaginal cream  famotidine (PEPCID) 20 MG tablet  guaiFENesin (MUCINEX) 600 MG 12 hr tablet  metoprolol tartrate (LOPRESSOR) 50 MG tablet  Multiple Vitamins-Minerals (PRESERVISION AREDS 2+MULTI VIT) CAPS  senna-docusate (SENOKOT-S/PERICOLACE) 8.6-50 MG tablet  tamsulosin (FLOMAX) 0.4 MG capsule  vitamin C (ASCORBIC ACID) 1000 MG  "TABS  warfarin ANTICOAGULANT (COUMADIN) 2.5 MG tablet            PHYSICAL EXAM     /62   Pulse 110   Temp 97.7  F (36.5  C) (Oral)   Resp 20   Ht 1.448 m (4' 9\")   Wt 53.1 kg (117 lb)   SpO2 94%   BMI 25.32 kg/m        PHYSICAL EXAM:     General: Patient appears well, nontoxic, comfortable  HEENT: Moist mucous membranes,  No head trauma.  No midline neck pain.  Cardiovascular: Normal rate, normal rhythm, no extremity edema.  No appreciable murmur.  Respiratory: No signs of respiratory distress, lungs are clear to auscultation bilaterally with no wheezes rhonchi or rales.  Abdominal: Soft, nontender, nondistended, no palpable masses, no guarding, no rebound  Musculoskeletal: Full range of motion of joints, no deformities appreciated.  Neurological: Alert and oriented, grossly neurologically intact.  Psychological: Normal affect and mood.  Integument: No rashes appreciated          RESULTS       Labs Ordered and Resulted from Time of ED Arrival to Time of ED Departure   CBC WITH PLATELETS - Abnormal       Result Value    WBC Count 4.3      RBC Count 3.97      Hemoglobin 11.3 (*)     Hematocrit 33.8 (*)     MCV 85      MCH 28.5      MCHC 33.4      RDW 14.5      Platelet Count 237     BASIC METABOLIC PANEL - Abnormal    Sodium 139      Potassium 3.6      Chloride 100      Carbon Dioxide (CO2) 26      Anion Gap 13      Urea Nitrogen 22      Creatinine 1.09      Calcium 8.7      Glucose 214 (*)     GFR Estimate 47 (*)    ROUTINE UA WITH MICROSCOPIC REFLEX TO CULTURE - Abnormal    Color Urine Light Yellow      Appearance Urine Turbid (*)     Glucose Urine Negative      Bilirubin Urine Negative      Ketones Urine Negative      Specific Gravity Urine 1.011      Blood Urine Negative      pH Urine 6.0      Protein Albumin Urine Negative      Urobilinogen Urine <2.0      Nitrite Urine Negative      Leukocyte Esterase Urine 500 Martin/uL (*)     Bacteria Urine Many (*)     WBC Clumps Urine Present (*)     RBC Urine 3 " (*)     WBC Urine >182 (*)     Squamous Epithelials Urine <1      Transitional Epithelials Urine <1     URINE CULTURE       No orders to display                     PROCEDURES:  Procedures:  Procedures       I, Lyn Monge am serving as a scribe to document services personally performed by Holger Medina DO, based on my observations and the provider's statements to me.  I, Holger Medina DO, attest that Lyn Monge is acting in a scribe capacity, has observed my performance of the services and has documented them in accordance with my direction.    Holger Medina DO  Emergency Medicine  [facility] EMERGENCY ROOM      Holger Medina DO  10/12/22 2412

## 2022-10-12 NOTE — ED TRIAGE NOTES
Pt presents to the ED with c/o of back pain an referred with UTI from clinic. Pt to receive IV abx for UTI.      Triage Assessment     Row Name 10/12/22 1519       Triage Assessment (Adult)    Airway WDL WDL       Respiratory WDL    Respiratory WDL WDL       Skin Circulation/Temperature WDL    Skin Circulation/Temperature WDL WDL       Cardiac WDL    Cardiac WDL WDL       Peripheral/Neurovascular WDL    Peripheral Neurovascular WDL WDL       Cognitive/Neuro/Behavioral WDL    Cognitive/Neuro/Behavioral WDL WDL       [place] Coma Scale    Best Eye Response 4-->(E4) spontaneous    Best Motor Response 6-->(M6) obeys commands    Best Verbal Response 5-->(V5) oriented    Lazaro Coma Scale Score 15

## 2022-10-13 LAB
ANION GAP SERPL CALCULATED.3IONS-SCNC: 11 MMOL/L (ref 5–18)
BUN SERPL-MCNC: 16 MG/DL (ref 8–28)
CALCIUM SERPL-MCNC: 9 MG/DL (ref 8.5–10.5)
CHLORIDE BLD-SCNC: 100 MMOL/L (ref 98–107)
CO2 SERPL-SCNC: 29 MMOL/L (ref 22–31)
CREAT SERPL-MCNC: 0.89 MG/DL (ref 0.6–1.1)
ERYTHROCYTE [DISTWIDTH] IN BLOOD BY AUTOMATED COUNT: 14.6 % (ref 10–15)
GFR SERPL CREATININE-BSD FRML MDRD: 60 ML/MIN/1.73M2
GLUCOSE BLD-MCNC: 165 MG/DL (ref 70–125)
HCT VFR BLD AUTO: 33.5 % (ref 35–47)
HGB BLD-MCNC: 11 G/DL (ref 11.7–15.7)
INR PPP: 1.7 (ref 0.85–1.15)
MCH RBC QN AUTO: 28.6 PG (ref 26.5–33)
MCHC RBC AUTO-ENTMCNC: 32.8 G/DL (ref 31.5–36.5)
MCV RBC AUTO: 87 FL (ref 78–100)
PLATELET # BLD AUTO: 203 10E3/UL (ref 150–450)
POTASSIUM BLD-SCNC: 3.4 MMOL/L (ref 3.5–5)
RBC # BLD AUTO: 3.84 10E6/UL (ref 3.8–5.2)
SODIUM SERPL-SCNC: 140 MMOL/L (ref 136–145)
WBC # BLD AUTO: 3.9 10E3/UL (ref 4–11)

## 2022-10-13 PROCEDURE — 99232 SBSQ HOSP IP/OBS MODERATE 35: CPT | Performed by: HOSPITALIST

## 2022-10-13 PROCEDURE — 120N000001 HC R&B MED SURG/OB

## 2022-10-13 PROCEDURE — 36415 COLL VENOUS BLD VENIPUNCTURE: CPT | Performed by: HOSPITALIST

## 2022-10-13 PROCEDURE — 250N000013 HC RX MED GY IP 250 OP 250 PS 637: Performed by: HOSPITALIST

## 2022-10-13 PROCEDURE — 250N000011 HC RX IP 250 OP 636: Performed by: HOSPITALIST

## 2022-10-13 PROCEDURE — 85027 COMPLETE CBC AUTOMATED: CPT | Performed by: HOSPITALIST

## 2022-10-13 PROCEDURE — 85610 PROTHROMBIN TIME: CPT | Performed by: HOSPITALIST

## 2022-10-13 PROCEDURE — 99207 PR CDG-CUT & PASTE-POTENTIAL IMPACT ON LEVEL: CPT | Performed by: HOSPITALIST

## 2022-10-13 PROCEDURE — 80048 BASIC METABOLIC PNL TOTAL CA: CPT | Performed by: HOSPITALIST

## 2022-10-13 RX ORDER — WARFARIN SODIUM 5 MG/1
5 TABLET ORAL
Status: COMPLETED | OUTPATIENT
Start: 2022-10-13 | End: 2022-10-13

## 2022-10-13 RX ADMIN — FAMOTIDINE 20 MG: 20 TABLET ORAL at 08:10

## 2022-10-13 RX ADMIN — DILTIAZEM HYDROCHLORIDE 180 MG: 180 CAPSULE, EXTENDED RELEASE ORAL at 20:04

## 2022-10-13 RX ADMIN — OXYCODONE HYDROCHLORIDE AND ACETAMINOPHEN 1000 MG: 500 TABLET ORAL at 08:10

## 2022-10-13 RX ADMIN — OXYCODONE HYDROCHLORIDE AND ACETAMINOPHEN 1000 MG: 500 TABLET ORAL at 20:04

## 2022-10-13 RX ADMIN — WARFARIN SODIUM 5 MG: 5 TABLET ORAL at 17:46

## 2022-10-13 RX ADMIN — Medication 1 CAPSULE: at 20:04

## 2022-10-13 RX ADMIN — BUMETANIDE 1 MG: 1 TABLET ORAL at 21:28

## 2022-10-13 RX ADMIN — MEROPENEM 1 G: 1 INJECTION, POWDER, FOR SOLUTION INTRAVENOUS at 16:12

## 2022-10-13 RX ADMIN — OXYCODONE HYDROCHLORIDE AND ACETAMINOPHEN 1000 MG: 500 TABLET ORAL at 14:05

## 2022-10-13 RX ADMIN — BUMETANIDE 2 MG: 1 TABLET ORAL at 06:39

## 2022-10-13 RX ADMIN — METOPROLOL TARTRATE 75 MG: 25 TABLET, FILM COATED ORAL at 08:10

## 2022-10-13 RX ADMIN — ACETAMINOPHEN 500 MG: 500 TABLET ORAL at 21:29

## 2022-10-13 RX ADMIN — CALCIUM 500 MG: 500 TABLET ORAL at 20:04

## 2022-10-13 RX ADMIN — SENNOSIDES AND DOCUSATE SODIUM 1 TABLET: 50; 8.6 TABLET ORAL at 21:29

## 2022-10-13 RX ADMIN — CALCIUM 500 MG: 500 TABLET ORAL at 08:10

## 2022-10-13 RX ADMIN — TAMSULOSIN HYDROCHLORIDE 0.4 MG: 0.4 CAPSULE ORAL at 21:28

## 2022-10-13 RX ADMIN — METOPROLOL TARTRATE 75 MG: 25 TABLET, FILM COATED ORAL at 20:04

## 2022-10-13 RX ADMIN — MEROPENEM 1 G: 1 INJECTION, POWDER, FOR SOLUTION INTRAVENOUS at 03:36

## 2022-10-13 RX ADMIN — CALCIUM 500 MG: 500 TABLET ORAL at 14:05

## 2022-10-13 RX ADMIN — ATORVASTATIN CALCIUM 20 MG: 10 TABLET, FILM COATED ORAL at 12:01

## 2022-10-13 RX ADMIN — CHOLECALCIFEROL TAB 25 MCG (1000 UNIT) 1000 UNITS: 25 TAB at 21:29

## 2022-10-13 ASSESSMENT — ACTIVITIES OF DAILY LIVING (ADL)
ADLS_ACUITY_SCORE: 31
ADLS_ACUITY_SCORE: 30
ADLS_ACUITY_SCORE: 31
ADLS_ACUITY_SCORE: 31
ADLS_ACUITY_SCORE: 30
ADLS_ACUITY_SCORE: 31
ADLS_ACUITY_SCORE: 31
ADLS_ACUITY_SCORE: 30
ADLS_ACUITY_SCORE: 30

## 2022-10-13 NOTE — PROGRESS NOTES
[facility]    Medicine Progress Note - Hospitalist Service    Date of Admission:  10/12/2022    Assessment & Plan               Alva Dumont is a 89 year old female admitted on 10/12/2022. She has a hx of recurrent UTIs, pAfib, HFpEF, HTN, MDD/anxiety presents with 6-days of urinary frequency, urge, and incomplete bladder emptying consistent with her previous UTIs; Urine Cx from OSH 10/6 demonstrate Citrobacter species with much resistance but with good sensitivities to meropenem, which has been ordered and started. Follow-up urine culture from here. Supportive cares. Awaiting urine culture from 10/12. PT and OT.    Acute Cystitis - Urinary Tract Infection, Complex  Citrobacter UTI  -Presented with urinary frequency, urge, incomplete emptying.  -[facility] Urine Cx from 10/6:  Citrobacter species PIPERACILLIN/TAZO 16: S   Citrobacter species CEFEPIME <=1: S   Citrobacter species TOBRAMYCIN >=16: R   Citrobacter species MEROPENEM <=0.25: S     -Urinalysis with reflex culture ordered. Follow-up results from 10/12 Urine Culture S/S.  -Continue IV meropenem and follow-up urine culture speciation/sensitivities to tailor antibiotics.   -Delirium precautions.    -If becomes acutely agitated or develops acute delirium, utilize verbal redirection first, and involve patient's family/contacts if available. Pharmacologic as-needed interventions as second-line, and would only judiciously consider 1:1 sitter and/or restraints if patient becomes a physical danger to self and/or to others/staff or if previous interventions unsuccessful or not effective.     Atrial Fibrillation   Anticoagulated   -Telemetry.  -Optimize potassium and magnesium. Keep K > 4.0, Mag > 2.0.   -Consider RN potassium and magnesium replacement protocols.   -Order home medications including AV-allyson rate control medication.  -Pharmacy to dose warfarin for anticoagulation.   -If any acute sustained rapid rates develop, obtain EKG to  "evaluate for RVR and initiate cardiac telemetry and update [facility] team.     HTN  HFpEF  -Order home medications and as needed apply specified hold parameters.   -Order home diuretics.     MDD/Anxiety  -Order home medications.            Diet: Combination Diet Low Saturated Fat Na <2400mg Diet    DVT Prophylaxis: Low Risk/Ambulatory with no VTE prophylaxis indicated, Pneumatic Compression Devices, Anti-embolisim stockings (TEDs) and Ambulate every shift  Short Catheter: Not present  Central Lines: None  Cardiac Monitoring: None  Code Status: No CPR- Do NOT Intubate      Disposition Plan      Expected Discharge Date: 10/14/2022      Destination: home with family;home with help/services (Currently with home PT and Skilled Nursing)          The patient's care was discussed with the Patient and Patient's Family.    Nadeem Zimmer MD  Hospitalist Service  [facility]  Securely message with the Vocera Web Console (learn more here)  Text page via [employer] Paging/Directory         Clinically Significant Risk Factors Present on Admission               # Coagulation Defect: home medication list includes an anticoagulant medication      # Overweight: Estimated body mass index is 25.56 kg/m  as calculated from the following:    Height as of this encounter: 1.448 m (4' 9\").    Weight as of this encounter: 53.6 kg (118 lb 1.6 oz).        ______________________________________________________________________    Interval History   No acute events overnight.    She reports her back pain usually associated with UTIs has resolved, urinary frequency and urge improving but still present, and no dysuria. No report of chest pain, shortness of breath, or other new complaints. Discussed plan of care with patient. Answered all questions to patient's verbalized understanding and satisfaction.    Data reviewed today: I reviewed all medications, new labs and imaging results over the last 24 hours. I personally reviewed OSH " microbiology lab data and 10/6 [facility] Urine Cx S/S.    Physical Exam   Vital Signs: Temp: 97.3  F (36.3  C) Temp src: Oral BP: (!) 146/96 Pulse: 105   Resp: 19 SpO2: 93 % O2 Device: None (Room air)    Weight: 118 lbs 1.6 oz  GENERAL:  Alert, appears comfortable, in no acute distress, appears stated age   HEAD:  Normocephalic, without obvious abnormality, atraumatic   EYES:  PERRL, conjunctiva/corneas clear, no scleral icterus, EOM's intact   NOSE: Nares normal, septum midline, mucosa normal, no drainage   THROAT: Lips, mucosa, and tongue normal; teeth and gums normal, mouth moist   NECK: Supple, symmetrical, trachea midline   BACK:   Symmetric, no curvature, ROM normal   LUNGS:   Clear to auscultation bilaterally, no rales, rhonchi, or wheezing, symmetric chest rise on inhalation, respirations unlabored   CHEST WALL:  No tenderness or deformity   HEART:  Regular rate and rhythm, S1 and S2 normal, no murmur, rub, or gallop    ABDOMEN:   Soft, non-tender, bowel sounds active all four quadrants, no masses, no organomegaly, no rebound or guarding   EXTREMITIES: Extremities normal, atraumatic, no cyanosis or edema    SKIN: Dry to touch, no exanthems in the visualized areas   NEURO: Alert, oriented x 4, moves all four extremities freely/spontaneously   PSYCH: Cooperative, behavior is appropriate      Data   Recent Labs   Lab 10/12/22  1817 10/12/22  1531   WBC  --  4.3   HGB  --  11.3*   MCV  --  85   PLT  --  237   INR 1.65*  --    NA  --  139   POTASSIUM  --  3.6   CHLORIDE  --  100   CO2  --  26   BUN  --  22   CR  --  1.09   ANIONGAP  --  13   YADY  --  8.7   GLC  --  214*     No results found for this or any previous visit (from the past 24 hour(s)).  Medications       acetaminophen  500 mg Oral At Bedtime     atorvastatin  20 mg Oral Daily with lunch     bumetanide  1 mg Oral At Bedtime     bumetanide  2 mg Oral QAM AC     calcium carbonate 500 mg (elemental)  1 tablet Oral TID     diltiazem ER  180 mg Oral QPM      estradiol  1 g Vaginal At Bedtime     famotidine  20 mg Oral Daily     [START ON 10/14/2022] influenza vac high-dose quad  0.7 mL Intramuscular Prior to discharge     meropenem  1 g Intravenous Q12H     metoprolol tartrate  75 mg Oral BID     multivitamin  with lutein  1 capsule Oral QPM     senna-docusate  1 tablet Oral At Bedtime     sodium chloride (PF)  3 mL Intracatheter Q8H     tamsulosin  0.4 mg Oral At Bedtime     vitamin C  1,000 mg Oral TID     Vitamin D3  1,000 Units Oral At Bedtime     Warfarin Therapy Reminder  1 each Oral See Admin Instructions

## 2022-10-13 NOTE — PROGRESS NOTES
SPIRITUAL HEALTH SERVICES Progress Note  WW  210    Visited with Alva per admission screening.    Alva shared she was admitted due to a bladder infection.    She shared she has multiple losses/deaths; 2 children, her , and a sister. She named her Religious debora as comforting and named she gets regular visits from her .  Writer shared prayer incorporating themes of visit and pt recited the Lords prayer along with writer.  Writer will continue to assess and support 1-2x a week while on unit    Avery Ferraro M.Div., [facility]  Staff    [phone]

## 2022-10-13 NOTE — PLAN OF CARE
Problem: Plan of Care - These are the overarching goals to be used throughout the patient stay.    Goal: Optimal Comfort and Wellbeing  Outcome: Adequate for Care Transition  Intervention: Provide Person-Centered Care  Recent Flowsheet Documentation  Taken 10/13/2022 0814 by Thao Mcadams RN  Trust Relationship/Rapport: care explained     Problem: Plan of Care - These are the overarching goals to be used throughout the patient stay.    Goal: Readiness for Transition of Care  Outcome: Adequate for Care Transition     Problem: Risk for Delirium  Goal: Improved Sleep  Outcome: Adequate for Care Transition    Goal Outcome Evaluation:    Patient is A&O x4, VSS. Standby assist. IV is saline locked. Medication taken with applesauce. Followed by [facility]. Patient is resting in their chair, call light within reach, chair locked, room free of fall risks.

## 2022-10-13 NOTE — CONSULTS
Care Management Initial Consult    General Information  Assessment completed with: Patient, VM-chart review,    Type of CM/SW Visit: Initial Assessment  Primary Care Provider verified and updated as needed: Yes   Readmission within the last 30 days: current reason for admission unrelated to previous admission   Reason for Consult: discharge planning, health care directive, transportation  Advance Care Planning: Advance Care Planning Reviewed: present on chart        Communication Assessment  Patient's communication style: spoken language (English or Bilingual)        Cognitive  Cognitive/Neuro/Behavioral: WDL                      Living Environment:   People in home: child(haritha), adult, grandchild(haritha)     Current living Arrangements: house      Able to return to prior arrangements: yes     Family/Social Support:  Care provided by: self, child(haritha), homecare agency  Provides care for: no one, unable/limited ability to care for self  Marital Status:   Children          Description of Support System: Supportive, Involved    Support Assessment: Adequate social supports, Adequate family and caregiver support    Current Resources:   Patient receiving home care services: Yes  Skilled Home Care Services: Skilled Nursing, Physicial Therapy  Community Resources: Home Care, DME, Other (see comment) (Food stamps)  Equipment currently used at home: walker, rolling  Supplies currently used at home: None    Employment/Financial:  Employment Status: retired     Financial Concerns: No concerns identified   Referral to Financial Worker: No     Lifestyle & Psychosocial Needs:  Social Determinants of Health     Tobacco Use: Low Risk      Smoking Tobacco Use: Never     Smokeless Tobacco Use: Never     Passive Exposure: Not on file   Alcohol Use: Not on file   Financial Resource Strain: Not on file   Food Insecurity: Not on file   Transportation Needs: Not on file   Physical Activity: Not on file   Stress: Not on file   Social  "Connections: Not on file   Intimate Partner Violence: Not on file   Depression: Not on file   Housing Stability: Not on file     Functional Status:  Prior to admission patient needed assistance:   Dependent ADLs:: Ambulation-walker  Dependent IADLs:: Cleaning, Cooking, Laundry, Transportation, Shopping, Meal Preparation  Assessment of Functional Status: Not at  functional baseline    Mental Health Status:  Mental Health Status: No Current Concerns       Chemical Dependency Status:  Chemical Dependency Status: No Current Concerns           Values/Beliefs:  Spiritual, Cultural Beliefs, Yazidism Practices, Values that affect care: no (None identified)             Additional Information:  Writer obtained assessment info from recent admission and confirmed with pt. [facility] for PT and Skilled Nurse per documentation. Pt shares a house with daughter son-in-law and other family. Family assists in the areas listed above and as needed otherwise. No concerns with eventual return home expressed by pt.    10/12 per KARLA Kumar-\"89 year old female admitted on 10/12/2022. She has a hx of recurrent UTIs, pAfib, HFpEF, HTN, MDD/anxiety presents with 6-days of urinary frequency, urge, and incomplete bladder emptying consistent with her previous UTIs; Urine Cx from OSH 10/6 demonstrate Citrobacter species with much resistance but with good sensitivities to meropenem, which has been ordered and started. Follow-up urine culture from here. Supportive cares.\"    No current consults. Anticipate return home to the care of family and continued home care service support. CM to follow for changes in current disposition and ensure appropriate communication of home care orders at time of discharge. Family to transport.    Jericho Low RN        "

## 2022-10-13 NOTE — UTILIZATION REVIEW
Admission Status; Secondary Review Determination       Under the authority of the Utilization Management Committee, the utilization review process indicated a secondary review on the above patient. The review outcome is based on review of the medical records, discussions with staff, and applying clinical experience noted on the date of the review.     (x) Inpatient Status Appropriate - This patient's medical care is consistent with medical management for inpatient care and reasonable inpatient medical practice.     RATIONALE FOR DETERMINATION     Ms. Dumont is a 89 yo female who presented to the ED after UC from 10/6/22 revealed multi-drug resistant Citrobacter.  Her UA is still significantly abnormal on repeat testing with high LE, WBC clumps and turbid appearance.  She has a h/o a fib and presented with significant tachycardia; she is still tachycardic this am.  She is remaining on IV meropenem q12 hours until UC results.        At the time of admission with the information available to the attending physician more than 2 nights Hospital complex care was anticipated, based on patient risk of adverse outcome if treated as outpatient and complex care required. Inpatient admission is appropriate based on the Medicare guidelines.       The information on this document is developed by the utilization review team in order for the business office to ensure compliance. This only denotes the appropriateness of proper admission status and does not reflect the quality of care rendered.   The definitions of Inpatient Status and Observation Status used in making the determination above are those provided in the CMS Coverage Manual, Chapter 1 and Chapter 6, section 70.4.         Sincerely,     Margot Frances, DO  Utilization Review  Physician Advisor  [facility].

## 2022-10-13 NOTE — PHARMACY-ANTICOAGULATION SERVICE
Clinical Pharmacy - Warfarin Dosing Consult     Pharmacy has been consulted to manage this patient s warfarin therapy.  Indication: Atrial Fibrillation  Therapy Goal: INR 2-3  Warfarin Prior to Admission: Yes  Warfarin PTA Regimen: 2.5 mg daily  Significant drug interactions: meropenem  Recent documented change in oral intake/nutrition: Unknown    INR   Date Value Ref Range Status   10/12/2022 1.65 (H) 0.85 - 1.15 Final   09/22/2022 3.19 (H) 0.85 - 1.15 Final       Recommend warfarin 3 mg today.  Pharmacy will monitor Alva Dumont daily and order warfarin doses to achieve specified goal.      Please contact pharmacy as soon as possible if the warfarin needs to be held for a procedure or if the warfarin goals change.

## 2022-10-13 NOTE — PLAN OF CARE
Problem: Infection  Goal: Absence of Infection Signs and Symptoms  Outcome: Progressing     Problem: Plan of Care - These are the overarching goals to be used throughout the patient stay.    Goal: Absence of Hospital-Acquired Illness or Injury  Intervention: Identify and Manage Fall Risk  Recent Flowsheet Documentation  Taken 10/12/2022 2114 by Fermín Low RN  Safety Promotion/Fall Prevention:   activity supervised   bed alarm on   clutter free environment maintained   fall prevention program maintained   increased rounding and observation   nonskid shoes/slippers when out of bed   patient and family education   safety round/check completed   Goal Outcome Evaluation:       The patient has been afebrile. Her heart rate was 128 and blood pressure was 170/81, but she was given her bed time meds, and make sure the next shift is aware of the elevated vital signs. The patient is scheduled to get IV antibiotics Q12 hours.  The patient is alert and oriented, and she is aware of how to call and says she will do so. She is a standby assist and uses a walker.

## 2022-10-14 ENCOUNTER — HOME INFUSION (PRE-WILLOW HOME INFUSION) (OUTPATIENT)
Dept: PHARMACY | Facility: CLINIC | Age: 87
End: 2022-10-14

## 2022-10-14 ENCOUNTER — APPOINTMENT (OUTPATIENT)
Dept: PHYSICAL THERAPY | Facility: CLINIC | Age: 87
DRG: 690 | End: 2022-10-14
Attending: HOSPITALIST
Payer: MEDICARE

## 2022-10-14 LAB
BACTERIA UR CULT: ABNORMAL
HOLD SPECIMEN: NORMAL
INR PPP: 1.88 (ref 0.85–1.15)

## 2022-10-14 PROCEDURE — 120N000001 HC R&B MED SURG/OB

## 2022-10-14 PROCEDURE — 99232 SBSQ HOSP IP/OBS MODERATE 35: CPT | Performed by: HOSPITALIST

## 2022-10-14 PROCEDURE — 85610 PROTHROMBIN TIME: CPT | Performed by: HOSPITALIST

## 2022-10-14 PROCEDURE — 36415 COLL VENOUS BLD VENIPUNCTURE: CPT | Performed by: HOSPITALIST

## 2022-10-14 PROCEDURE — 99207 PR CDG-CUT & PASTE-POTENTIAL IMPACT ON LEVEL: CPT | Performed by: HOSPITALIST

## 2022-10-14 PROCEDURE — 250N000013 HC RX MED GY IP 250 OP 250 PS 637: Performed by: HOSPITALIST

## 2022-10-14 PROCEDURE — 97161 PT EVAL LOW COMPLEX 20 MIN: CPT | Mod: GP

## 2022-10-14 PROCEDURE — 250N000011 HC RX IP 250 OP 636: Performed by: HOSPITALIST

## 2022-10-14 RX ORDER — WARFARIN SODIUM 1 MG/1
3 TABLET ORAL
Status: COMPLETED | OUTPATIENT
Start: 2022-10-14 | End: 2022-10-14

## 2022-10-14 RX ORDER — POTASSIUM CHLORIDE 1500 MG/1
40 TABLET, EXTENDED RELEASE ORAL ONCE
Status: COMPLETED | OUTPATIENT
Start: 2022-10-14 | End: 2022-10-14

## 2022-10-14 RX ADMIN — BUMETANIDE 2 MG: 1 TABLET ORAL at 06:50

## 2022-10-14 RX ADMIN — ACETAMINOPHEN 500 MG: 500 TABLET ORAL at 22:08

## 2022-10-14 RX ADMIN — METOPROLOL TARTRATE 75 MG: 25 TABLET, FILM COATED ORAL at 22:08

## 2022-10-14 RX ADMIN — OXYCODONE HYDROCHLORIDE AND ACETAMINOPHEN 1000 MG: 500 TABLET ORAL at 22:08

## 2022-10-14 RX ADMIN — SENNOSIDES AND DOCUSATE SODIUM 1 TABLET: 50; 8.6 TABLET ORAL at 22:22

## 2022-10-14 RX ADMIN — ATORVASTATIN CALCIUM 20 MG: 10 TABLET, FILM COATED ORAL at 11:57

## 2022-10-14 RX ADMIN — BUMETANIDE 1 MG: 1 TABLET ORAL at 22:08

## 2022-10-14 RX ADMIN — OXYCODONE HYDROCHLORIDE AND ACETAMINOPHEN 1000 MG: 500 TABLET ORAL at 13:12

## 2022-10-14 RX ADMIN — CALCIUM 500 MG: 500 TABLET ORAL at 13:12

## 2022-10-14 RX ADMIN — MEROPENEM 1 G: 1 INJECTION, POWDER, FOR SOLUTION INTRAVENOUS at 17:32

## 2022-10-14 RX ADMIN — TAMSULOSIN HYDROCHLORIDE 0.4 MG: 0.4 CAPSULE ORAL at 22:07

## 2022-10-14 RX ADMIN — OXYCODONE HYDROCHLORIDE AND ACETAMINOPHEN 1000 MG: 500 TABLET ORAL at 09:07

## 2022-10-14 RX ADMIN — DILTIAZEM HYDROCHLORIDE 180 MG: 180 CAPSULE, EXTENDED RELEASE ORAL at 22:08

## 2022-10-14 RX ADMIN — METOPROLOL TARTRATE 75 MG: 25 TABLET, FILM COATED ORAL at 09:06

## 2022-10-14 RX ADMIN — POTASSIUM CHLORIDE 40 MEQ: 1500 TABLET, EXTENDED RELEASE ORAL at 09:06

## 2022-10-14 RX ADMIN — WARFARIN SODIUM 3 MG: 1 TABLET ORAL at 17:32

## 2022-10-14 RX ADMIN — Medication 1 CAPSULE: at 22:08

## 2022-10-14 RX ADMIN — CALCIUM 500 MG: 500 TABLET ORAL at 22:07

## 2022-10-14 RX ADMIN — MEROPENEM 1 G: 1 INJECTION, POWDER, FOR SOLUTION INTRAVENOUS at 03:28

## 2022-10-14 RX ADMIN — FAMOTIDINE 20 MG: 20 TABLET ORAL at 09:07

## 2022-10-14 RX ADMIN — CHOLECALCIFEROL TAB 25 MCG (1000 UNIT) 1000 UNITS: 25 TAB at 22:21

## 2022-10-14 RX ADMIN — CALCIUM 500 MG: 500 TABLET ORAL at 09:07

## 2022-10-14 ASSESSMENT — ACTIVITIES OF DAILY LIVING (ADL)
ADLS_ACUITY_SCORE: 30
ADLS_ACUITY_SCORE: 31
ADLS_ACUITY_SCORE: 30
ADLS_ACUITY_SCORE: 33
ADLS_ACUITY_SCORE: 30
ADLS_ACUITY_SCORE: 30

## 2022-10-14 NOTE — PLAN OF CARE
"  Problem: Plan of Care - These are the overarching goals to be used throughout the patient stay.    Goal: Plan of Care Review  Description: The Plan of Care Review/Shift note should be completed every shift.  The Outcome Evaluation is a brief statement about your assessment that the patient is improving, declining, or no change.  This information will be displayed automatically on your shift note.  Outcome: Progressing  Goal: Patient-Specific Goal (Individualized)  Description: You can add care plan individualizations to a care plan. Examples of Individualization might be:  \"Parent requests to be called daily at 9am for status\", \"I have a hard time hearing out of my right ear\", or \"Do not touch me to wake me up as it startles me\".  Outcome: Progressing     Problem: Infection  Goal: Absence of Infection Signs and Symptoms  Outcome: Progressing   Goal Outcome Evaluation:    Alert and oriented x4  In good spirits  Patient denies pain throughout shift.  INR today was 1.7, patient received 5mg warfarin  Vital signs stable throughout shift  Patient taking oral medications whole with applesauce                        "

## 2022-10-14 NOTE — PROGRESS NOTES
Care Management Follow Up    Length of Stay (days): 2    Expected Discharge Date: 10/15/2022     Concerns to be Addressed: care coordination/care conferences, discharge planning         Additional Information:  Referral sent to Mayuri Stauffer at [facility] to run benefits in case patient needs to go home on iv antibx. Will continue to follow along for discharge plans. She has homecare currently through [facility] for PT/SN.       Lucy Celaya RN

## 2022-10-14 NOTE — PLAN OF CARE
Problem: Plan of Care - These are the overarching goals to be used throughout the patient stay.    Goal: Plan of Care Review  Description: The Plan of Care Review/Shift note should be completed every shift.  The Outcome Evaluation is a brief statement about your assessment that the patient is improving, declining, or no change.  This information will be displayed automatically on your shift note.  Outcome: Progressing   Goal Outcome Evaluation:                      Pt alert and oriented x 4. Still with some urinary frequency but much improved since admission. Received IV ABX overnight. Denies pain. IV saline locked between ABX. Up to bathroom with standby assist. Bed alarm in use for pt safety, calls appropriately.

## 2022-10-14 NOTE — PLAN OF CARE
Problem: Infection  Goal: Absence of Infection Signs and Symptoms  Outcome: Adequate for Care Transition      Goal Outcome Evaluation:  Pt pleasant and cooperative, VSS, IV Abx given.  Pt isn't eager to work with therapies today.  Waiting for urine cultures and ID consult for pt plan.

## 2022-10-14 NOTE — PROGRESS NOTES
[facility]    Referral received  from Lucy Celaya CM, for soft check only, in case IV antibiotics would be needed at discharge.    Benefits verified. Pt does not have IV antibiotic coverage in the home under her Medicare and BCBS supplemental plan.  The drug would be billed to the Part D and the supplies will be self pay.  Based on Meropenem 1g IV q 12 hours, the total cost is $79.62/day for drugs and supplies.    In regard to nursing, this pt should have coverage, if she is homebound.  If not homebound, the cost would be $90 per visit, if [facility] bills for it.    Thank you for the referral.    Mayuri Stauffer, RN, BSN  [facility] Liaison  [phone] (Mon through Fri, 8:00 am-5:00 pm)  [phone] (Office)

## 2022-10-14 NOTE — PROGRESS NOTES
Patient does not have iv abx coverage in the home with their Medicare and BCBS Supplement plan. Drug would be billed to the part D and supplies will be self-pay. Based on Meropenem 1g q12h, total cost is $79.62/day for drug and supplies.    For nursing, patient should have coverage if homebound, however [facility] is not contracted with Medicare and an outside nursing agency would be utilized instead. If patient is not homebound, there is no coverage and I can see patient if patient agrees to self-pay for $90 per visit.        Please contact Intake with any questions, [phone] or In Basket pool,  Home Infusion ([id number]).

## 2022-10-14 NOTE — PROGRESS NOTES
[facility]    Medicine Progress Note - Hospitalist Service    Date of Admission:  10/12/2022    Assessment & Plan             Alva Dumont is a 89 year old female admitted on 10/12/2022. She has a hx of recurrent UTIs, pAfib, HFpEF, HTN, MDD/anxiety presents with 6-days of urinary frequency, urge, and incomplete bladder emptying consistent with her previous UTIs; Urine Cx from OSH 10/6 demonstrate Citrobacter species with much resistance but with good sensitivities to meropenem, which has been ordered and started. Follow-up urine culture from here. Supportive cares. Awaiting urine culture from 10/12. PT and OT pending to assist with disposition determination.    Acute Cystitis - Urinary Tract Infection, Complex  Citrobacter UTI  -Presented with urinary frequency, urge, incomplete emptying.  -[facility] Urine Cx from 10/6:  Citrobacter species PIPERACILLIN/TAZO 16: S   Citrobacter species CEFEPIME <=1: S   Citrobacter species TOBRAMYCIN >=16: R   Citrobacter species MEROPENEM <=0.25: S     -Urinalysis with reflex culture ordered. Follow-up results from 10/12 Urine Culture S/S.  -Continue IV meropenem and follow-up urine culture speciation/sensitivities to tailor antibiotics.   -Delirium precautions.    -If becomes acutely agitated or develops acute delirium, utilize verbal redirection first, and involve patient's family/contacts if available. Pharmacologic as-needed interventions as second-line, and would only judiciously consider 1:1 sitter and/or restraints if patient becomes a physical danger to self and/or to others/staff or if previous interventions unsuccessful or not effective.     Hypokalemia  -K 3.4 on 10/14  -Ordered 40 mEq KDur    Atrial Fibrillation   Anticoagulated   -Telemetry.  -Optimize potassium and magnesium. Keep K > 4.0, Mag > 2.0.   -Consider RN potassium and magnesium replacement protocols.   -Order home medications including AV-allyson rate control  "medication.  -Pharmacy to dose warfarin for anticoagulation.   -If any acute sustained rapid rates develop, obtain EKG to evaluate for RVR and initiate cardiac telemetry and update [facility] team.     HTN  HFpEF  -Order home medications and as needed apply specified hold parameters.   -Order home diuretics.     MDD/Anxiety  -Order home medications.            Diet: Combination Diet Low Saturated Fat Na <2400mg Diet  Room Service    DVT Prophylaxis: Low Risk/Ambulatory with no VTE prophylaxis indicated, Pneumatic Compression Devices, Anti-embolisim stockings (TEDs) and Ambulate every shift  Short Catheter: Not present  Central Lines: None  Cardiac Monitoring: None  Code Status: No CPR- Do NOT Intubate      Disposition Plan      Expected Discharge Date: 10/14/2022      Destination: home with family;home with help/services (Currently with home PT and Skilled Nursing)  Discharge Comments: iv abx        The patient's care was discussed with the Patient and Patient's Family.    Nadeem Zimmer MD  Hospitalist Service  [facility]  Securely message with the Vocera Web Console (learn more here)  Text page via [employer] Paging/Directory         Clinically Significant Risk Factors Present on Admission                # Overweight: Estimated body mass index is 25.56 kg/m  as calculated from the following:    Height as of this encounter: 1.448 m (4' 9\").    Weight as of this encounter: 53.6 kg (118 lb 1.6 oz).        ______________________________________________________________________    Interval History   No acute events overnight.    No report of chest pain, shortness of breath, or other new complaints. Remains in good spirits. Up in chair. Discussed plan of care with patient. Answered all questions to patient's verbalized understanding and satisfaction.    Data reviewed today: I reviewed all medications, new labs and imaging results over the last 24 hours. I personally reviewed OSH microbiology lab data and " 10/6 [facility] Urine Cx S/S.    Physical Exam   Vital Signs: Temp: 97.3  F (36.3  C) Temp src: Oral BP: 131/60 Pulse: 80   Resp: 18 SpO2: 94 % O2 Device: None (Room air)    Weight: 118 lbs 1.6 oz  GENERAL:  Alert, appears comfortable, in no acute distress, appears stated age, out of bed and up in chair   HEAD:  Normocephalic, without obvious abnormality, atraumatic   EYES:  PERRL, conjunctiva/corneas clear, no scleral icterus, EOM's intact   NOSE: Nares normal, septum midline, mucosa normal, no drainage   THROAT: Lips, mucosa, and tongue normal; teeth and gums normal, mouth moist   NECK: Supple, symmetrical, trachea midline   BACK:   Symmetric, no curvature, ROM normal   LUNGS:   Clear to auscultation bilaterally, no rales, rhonchi, or wheezing, symmetric chest rise on inhalation, respirations unlabored   CHEST WALL:  No tenderness or deformity   HEART:  Regular rate and rhythm, S1 and S2 normal, no murmur, rub, or gallop    ABDOMEN:   Soft, non-tender, bowel sounds active all four quadrants, no masses, no organomegaly, no rebound or guarding   EXTREMITIES: Extremities normal, atraumatic, no cyanosis or edema    SKIN: Dry to touch, no exanthems in the visualized areas   NEURO: Alert, oriented x 4, moves all four extremities freely/spontaneously   PSYCH: Cooperative, behavior is appropriate      Data   Recent Labs   Lab 10/13/22  0944 10/12/22  1817 10/12/22  1531   WBC 3.9*  --  4.3   HGB 11.0*  --  11.3*   MCV 87  --  85     --  237   INR 1.70* 1.65*  --      --  139   POTASSIUM 3.4*  --  3.6   CHLORIDE 100  --  100   CO2 29  --  26   BUN 16  --  22   CR 0.89  --  1.09   ANIONGAP 11  --  13   YADY 9.0  --  8.7   *  --  214*     No results found for this or any previous visit (from the past 24 hour(s)).  Medications       acetaminophen  500 mg Oral At Bedtime     atorvastatin  20 mg Oral Daily with lunch     bumetanide  1 mg Oral At Bedtime     bumetanide  2 mg Oral QAM AC     calcium carbonate 500  mg (elemental)  1 tablet Oral TID     diltiazem ER  180 mg Oral QPM     estradiol  1 g Vaginal At Bedtime     famotidine  20 mg Oral Daily     influenza vac high-dose quad  0.7 mL Intramuscular Prior to discharge     meropenem  1 g Intravenous Q12H     metoprolol tartrate  75 mg Oral BID     multivitamin  with lutein  1 capsule Oral QPM     senna-docusate  1 tablet Oral At Bedtime     sodium chloride (PF)  3 mL Intracatheter Q8H     tamsulosin  0.4 mg Oral At Bedtime     vitamin C  1,000 mg Oral TID     Vitamin D3  1,000 Units Oral At Bedtime     Warfarin Therapy Reminder  1 each Oral See Admin Instructions

## 2022-10-15 VITALS
DIASTOLIC BLOOD PRESSURE: 63 MMHG | SYSTOLIC BLOOD PRESSURE: 123 MMHG | HEIGHT: 57 IN | TEMPERATURE: 97.3 F | RESPIRATION RATE: 16 BRPM | BODY MASS INDEX: 25.48 KG/M2 | OXYGEN SATURATION: 92 % | HEART RATE: 97 BPM | WEIGHT: 118.1 LBS

## 2022-10-15 LAB
HOLD SPECIMEN: NORMAL
INR PPP: 2.06 (ref 0.85–1.15)

## 2022-10-15 PROCEDURE — G0008 ADMIN INFLUENZA VIRUS VAC: HCPCS | Performed by: HOSPITALIST

## 2022-10-15 PROCEDURE — 85610 PROTHROMBIN TIME: CPT | Performed by: HOSPITALIST

## 2022-10-15 PROCEDURE — 250N000011 HC RX IP 250 OP 636: Performed by: HOSPITALIST

## 2022-10-15 PROCEDURE — 250N000013 HC RX MED GY IP 250 OP 250 PS 637: Performed by: HOSPITALIST

## 2022-10-15 PROCEDURE — 99239 HOSP IP/OBS DSCHRG MGMT >30: CPT | Performed by: HOSPITALIST

## 2022-10-15 PROCEDURE — 36415 COLL VENOUS BLD VENIPUNCTURE: CPT | Performed by: HOSPITALIST

## 2022-10-15 PROCEDURE — 90662 IIV NO PRSV INCREASED AG IM: CPT | Performed by: HOSPITALIST

## 2022-10-15 RX ORDER — WARFARIN SODIUM 1 MG/1
3 TABLET ORAL
Status: DISCONTINUED | OUTPATIENT
Start: 2022-10-15 | End: 2022-10-15 | Stop reason: HOSPADM

## 2022-10-15 RX ORDER — GRANULES FOR ORAL 3 G/1
3 POWDER ORAL ONCE
Status: COMPLETED | OUTPATIENT
Start: 2022-10-15 | End: 2022-10-15

## 2022-10-15 RX ORDER — GUAIFENESIN 600 MG/1
600 TABLET, EXTENDED RELEASE ORAL 2 TIMES DAILY PRN
COMMUNITY
Start: 2022-10-15

## 2022-10-15 RX ORDER — GRANULES FOR ORAL 3 G/1
3 POWDER ORAL ONCE
Qty: 1 PACKET | Refills: 0 | Status: SHIPPED | OUTPATIENT
Start: 2022-10-17 | End: 2022-10-17

## 2022-10-15 RX ADMIN — CALCIUM 500 MG: 500 TABLET ORAL at 09:01

## 2022-10-15 RX ADMIN — INFLUENZA A VIRUS A/VICTORIA/2570/2019 IVR-215 (H1N1) ANTIGEN (FORMALDEHYDE INACTIVATED), INFLUENZA A VIRUS A/DARWIN/9/2021 SAN-010 (H3N2) ANTIGEN (FORMALDEHYDE INACTIVATED), INFLUENZA B VIRUS B/PHUKET/3073/2013 ANTIGEN (FORMALDEHYDE INACTIVATED), AND INFLUENZA B VIRUS B/MICHIGAN/01/2021 ANTIGEN (FORMALDEHYDE INACTIVATED) 0.7 ML: 60; 60; 60; 60 INJECTION, SUSPENSION INTRAMUSCULAR at 10:38

## 2022-10-15 RX ADMIN — FAMOTIDINE 20 MG: 20 TABLET ORAL at 09:01

## 2022-10-15 RX ADMIN — BUMETANIDE 2 MG: 1 TABLET ORAL at 06:44

## 2022-10-15 RX ADMIN — METOPROLOL TARTRATE 75 MG: 25 TABLET, FILM COATED ORAL at 09:01

## 2022-10-15 RX ADMIN — GRANULES FOR ORAL SOLUTION 3 G: 3 POWDER ORAL at 09:09

## 2022-10-15 RX ADMIN — OXYCODONE HYDROCHLORIDE AND ACETAMINOPHEN 1000 MG: 500 TABLET ORAL at 09:01

## 2022-10-15 RX ADMIN — MEROPENEM 1 G: 1 INJECTION, POWDER, FOR SOLUTION INTRAVENOUS at 04:40

## 2022-10-15 ASSESSMENT — ACTIVITIES OF DAILY LIVING (ADL)
ADLS_ACUITY_SCORE: 33

## 2022-10-15 NOTE — PLAN OF CARE
Problem: Plan of Care - These are the overarching goals to be used throughout the patient stay.    Goal: Absence of Hospital-Acquired Illness or Injury  Intervention: Identify and Manage Fall Risk  Recent Flowsheet Documentation  Taken 10/14/2022 1630 by Reece Becerra RN  Safety Promotion/Fall Prevention:   activity supervised   bed alarm on     Goal Outcome Evaluation:  Pt denies pain at this time. Urine cultures pending. Pt on IV antibiotics. Vitally stable, except BP elevated at HS, scheduled BP med's given at HS, will continue to monitor.

## 2022-10-15 NOTE — PLAN OF CARE
Goal Outcome Evaluation:  Pt is ready for discharge, will review pt's discharge information with pt's family when they arrive.  Pt is pleasant, ate well and ambulating to the restroom with little assistance (standby).  Provide influenza vaccine, vaccine info sheet is with discharge paperwork.

## 2022-10-15 NOTE — DISCHARGE SUMMARY
[facility]  Hospitalist Discharge Summary      Date of Admission:  10/12/2022  Date of Discharge:  10/15/2022 11:48 AM  Discharging Provider: Nadeem Zimmer MD  Discharge Service: Hospitalist Service    Discharge Diagnoses   Acute cystitis  Citrobacter UTI  Hypokalemia  Afib   Anticoagulated  HTN  HFpEF  MDD/Anxiety    Follow-ups Needed After Discharge   Follow-up Appointments     Follow-up and recommended labs and tests      Follow up with primary care provider, Martin Stauffer, within 7 days for   hospital follow- up.             Unresulted Labs Ordered in the Past 30 Days of this Admission     No orders found from 9/12/2022 to 10/13/2022.      These results will be followed up by NA    Discharge Disposition   Discharged to home  Condition at discharge: Good    Hospital Course        Alva Dumont is a 89 year old female admitted on 10/12/2022. She has a hx of recurrent UTIs, pAfib, HFpEF, HTN, MDD/anxiety presents with 6-days of urinary frequency, urge, and incomplete bladder emptying consistent with her previous UTIs; Urine Cx from OSH 10/6 demonstrated Citrobacter species with much resistance but with good sensitivities to meropenem, which had been ordered and started. Urine Cx here grew Citrobacter as well. She clinically improved with antibiotics. Once the 10/12/22 urine culture s/s returned, a discussion was held with microbiology and pharmacy, and then subsequently with Alva and her daughter Hubert (and Hubert' ). Pharmacy recommended fosfomycin for Citrobacter and it was offered to Alva and Hubert to utilize fosfomycin vs implementing a PICC-line and 4 additional days of IV antibiotics with meropenem; ultimately, Alva and Hubert agreed with fosfomycin with two dosing, one at discharge and a second dose followed two days later as per discussion with pharmacy. This was completed and prescribed. PT and OT recommended return home with home PT/OT which was ordered/arranged.  Alva and Hubert stated their understanding and verbalized their satisfaction and agreement with the treatment and follow-up plan. PCP follow-up.    Consultations This Hospital Stay   PHARMACY TO DOSE WARFARIN  CARE MANAGEMENT / SOCIAL WORK IP CONSULT  OCCUPATIONAL THERAPY ADULT IP CONSULT  PHYSICAL THERAPY ADULT IP CONSULT    Code Status   No CPR- Do NOT Intubate    Time Spent on this Encounter   I, Nadeem Zimmer MD, personally saw the patient today and spent greater than 30 minutes discharging this patient.       Nadeem Zimmer MD  [address] [address]  Phone: [phone]  Fax: [phone]  ______________________________________________________________________    Physical Exam   Vital Signs: Temp: 97.3  F (36.3  C) Temp src: Oral BP: 123/63 Pulse: 97   Resp: 16 SpO2: 92 % O2 Device: None (Room air)    Weight: 118 lbs 1.6 oz  GENERAL:  Alert, appears comfortable, in no acute distress, appears stated age   HEAD:  Normocephalic, without obvious abnormality, atraumatic   EYES:  PERRL, conjunctiva/corneas clear, no scleral icterus, EOM's intact   NOSE: Nares normal, septum midline, mucosa normal, no drainage   THROAT: Lips, mucosa, and tongue normal; teeth and gums normal, mouth moist   NECK: Supple, symmetrical, trachea midline   BACK:   Symmetric, no curvature, ROM normal   LUNGS:   Clear to auscultation bilaterally, no rales, rhonchi, or wheezing, symmetric chest rise on inhalation, respirations unlabored   CHEST WALL:  No tenderness or deformity   HEART:  Regular rate and rhythm, S1 and S2 normal, no murmur, rub, or gallop    ABDOMEN:   Soft, non-tender, bowel sounds active all four quadrants, no masses, no organomegaly, no rebound or guarding   EXTREMITIES: Extremities normal, atraumatic, no cyanosis or edema    SKIN: Dry to touch, no exanthems in the visualized areas   NEURO: Alert, oriented x 4, moves all four extremities  freely/spontaneously   PSYCH: Cooperative, behavior is appropriate           Primary Care Physician   Martin Stauffer    Discharge Orders      Reason for your hospital stay    Citrobacter urinary tract infection     Follow-up and recommended labs and tests    Follow up with primary care provider, Martin Stauffer, within 7 days for hospital follow- up.     Activity    Your activity upon discharge: activity as tolerated and no driving for today     Diet    Follow this diet upon discharge: Orders Placed This Encounter      Room Service      Combination Diet Low Saturated Fat Na <2400mg Diet       Significant Results and Procedures   Most Recent 3 CBC's:Recent Labs   Lab Test 10/13/22  0944 10/12/22  1531 09/22/22  0705   WBC 3.9* 4.3 10.3   HGB 11.0* 11.3* 12.2   MCV 87 85 82    237 225     Most Recent 3 BMP's:Recent Labs   Lab Test 10/13/22  0944 10/12/22  1531 09/22/22  1357 09/22/22  0824 09/22/22  0705    139  --   --  134*   POTASSIUM 3.4* 3.6  --   --  3.6   CHLORIDE 100 100  --   --  90*   CO2 29 26  --   --  33*   BUN 16 22  --   --  59*   CR 0.89 1.09  --   --  1.09   ANIONGAP 11 13  --   --  11   YADY 9.0 8.7  --   --  8.7   * 214* 265*   < > 188*    < > = values in this interval not displayed.     Most Recent 2 LFT's:Recent Labs   Lab Test 09/16/22  1807 05/09/22  1907   AST 25 24   ALT 18 23   ALKPHOS 92 75   BILITOTAL 0.8 0.4     Most Recent Urinalysis:Recent Labs   Lab Test 10/12/22  1602 04/29/22  1840 06/23/20  1139   COLOR Light Yellow   < > Straw   APPEARANCE Turbid*   < > Clear   URINEGLC Negative   < > Negative   URINEBILI Negative   < > Negative   URINEKETONE Negative   < > Negative   SG 1.011   < > 1.005   UBLD Negative   < > Negative   URINEPH 6.0   < > 8.0   PROTEIN Negative   < > Negative   UROBILINOGEN  --   --  <2.0 E.U./dL   NITRITE Negative   < > Negative   LEUKEST 500 Martin/uL*   < > Negative   RBCU 3*   < >  --    WBCU >182*   < >  --     < > = values in this interval not  displayed.   ,   Results for orders placed or performed during the hospital encounter of 09/16/22   XR Chest Port 1 View    Narrative    EXAM: XR CHEST PORT 1 VIEW  LOCATION: [facility]  DATE/TIME: 9/16/2022 7:14 PM    INDICATION: cough  COMPARISON: 05/20/2021      Impression    IMPRESSION: Increasing cardiomegaly. Increasing prominence of central pulmonary vasculature with mild interstitial edema pattern. Small to moderate right pleural effusion and trace left effusion. No pneumothorax. No acute bony abnormality.       Discharge Medications   Current Discharge Medication List      START taking these medications    Details   fosfomycin (MONUROL) 3 g Packet Take 1 packet (3 g) by mouth once for 1 dose  Qty: 1 packet, Refills: 0    Associated Diagnoses: Citrobacter infection         CONTINUE these medications which have CHANGED    Details   guaiFENesin (MUCINEX) 600 MG 12 hr tablet Take 1 tablet (600 mg) by mouth 2 times daily as needed    Associated Diagnoses: Infection due to 2019 novel coronavirus         CONTINUE these medications which have NOT CHANGED    Details   acetaminophen (TYLENOL) 500 MG tablet Take 500 mg by mouth At Bedtime      atorvastatin (LIPITOR) 20 MG tablet Take 20 mg by mouth daily (with lunch)      benzonatate (TESSALON) 100 MG capsule Take 1 capsule (100 mg) by mouth 3 times daily as needed for cough  Qty: 30 capsule, Refills: 0    Associated Diagnoses: Infection due to 2019 novel coronavirus      betamethasone dipropionate (DIPROSONE) 0.05 % external cream Apply topically 2 times daily as needed      !! bumetanide (BUMEX) 1 MG tablet Take 2 mg by mouth daily before breakfast      !! bumetanide (BUMEX) 1 MG tablet Take 1 mg by mouth At Bedtime      cephALEXin (KEFLEX) 250 MG capsule Take 250 mg by mouth At Bedtime      cholecalciferol 25 MCG (1000 UT) TABS Take 1,000 Units by mouth At Bedtime      diltiazem ER (DILT-XR) 180 MG 24 hr capsule Take 180 mg by mouth every  "evening      diphenhydrAMINE (BENADRYL) 25 MG tablet Take 0.5 tablets (12.5 mg) by mouth every 6 hours as needed for itching or allergies    Associated Diagnoses: Adverse effect of drug, sequela      estradiol (ESTRACE) 0.1 MG/GM vaginal cream Place 1 g vaginally as needed      famotidine (PEPCID) 20 MG tablet Take 20 mg by mouth 2 times daily      metoprolol tartrate (LOPRESSOR) 50 MG tablet Take 75 mg by mouth 2 times daily      Multiple Vitamins-Minerals (PRESERVISION AREDS 2+MULTI VIT) CAPS Take 1 capsule by mouth every evening      senna-docusate (SENOKOT-S/PERICOLACE) 8.6-50 MG tablet Take 1 tablet by mouth At Bedtime      tamsulosin (FLOMAX) 0.4 MG capsule Take 1 capsule (0.4 mg) by mouth At Bedtime  Qty: 30 capsule, Refills: 0    Associated Diagnoses: Urinary retention      vitamin C (ASCORBIC ACID) 1000 MG TABS Take 1,000 mg by mouth 3 times daily      warfarin ANTICOAGULANT (COUMADIN) 2.5 MG tablet Take 1 tablet (2.5 mg) by mouth At Bedtime    Associated Diagnoses: Atrial fibrillation with RVR (H)      calcium carbonate 500 mg (elemental) (OSCAL 500) 1250 (500 Ca) MG TABS tablet Take 1 tablet by mouth 3 times daily       !! - Potential duplicate medications found. Please discuss with provider.        Allergies   Allergies   Allergen Reactions     Cefprozil Shortness Of Breath     Oxycodone-Acetaminophen Itching     Penicillins Nausea and Rash     Has had different cephalosporins in past , tolerates Augmentin ok if benadryl is given together (last given 12/2018)  Tolerated Zosyn 6/2019.   Has had different cephalosporins in past       Sulfa Drugs Hives and Other (See Comments)     Other reaction(s): Edema, Other (See Comments)  Swelling in hands and feet    Swelling in hands and feet  Other Reaction(s): Edema  Swelling in hands and feet       Ciprofloxacin Hives     Clonidine Other (See Comments)     Extreme Fatigue  Extreme Fatigue       Codeine Other (See Comments)     \"felt wired\"    \"felt wired\"       " Levofloxacin Other (See Comments)     agitation       Azithromycin Nausea and Diarrhea     Cefaclor      Hydrochlorothiazide Nausea     Nitrofurantoin      Oxycodone Itching     Spironolactone Other (See Comments)     Patient can't remember what happens     Zestril [Lisinopril] Other (See Comments)     Patient can not remember what happens     Amoxicillin Hives and Rash     Has had different cephalosporins in past     Aspirin Unknown and Other (See Comments)     Other reaction(s): Other (See Comments)  Contraindication with Coumadin Therapy    Contraindication with Coumadin Therapy  Contraindication with Coumadin Therapy

## 2022-10-15 NOTE — PROGRESS NOTES
Care Management Discharge Note    Discharge Date: 10/15/2022     Discharge Disposition:  Home    Discharge Services: None    Discharge DME: None    Discharge Transportation: family or friend will provide    Education Provided on the Discharge Plan:  AVS per bedside.     Persons Notified of Discharge Plans: patient    Patient/Family in Agreement with the Plan: yes      Additional Information:  Chart reviewed. Discharged per bedside RN. [facility] had accepted but no orders for home care at discharge.         Mylene Carrasco RN

## 2022-10-15 NOTE — PLAN OF CARE
Problem: UTI (Urinary Tract Infection)  Goal: Improved Infection Symptoms  Outcome: Progressing   Goal Outcome Evaluation:  VSS.  Denies nausea, SOB and pain.  Up A1 with walker.  Alarms on for safety.

## 2022-10-16 NOTE — PROGRESS NOTES
Physical Therapy Discharge Summary    Reason for therapy discharge:    Discharged to home.    Progress towards therapy goal(s). See goals on Care Plan in [facility] electronic health record for goal details.  Goals not met.  Barriers to achieving goals:   discharge from facility.    Therapy recommendation(s):    Continued therapy is recommended.  Rationale/Recommendations:  pt not at optimal functional mobility, home PT was recommended.

## 2022-10-18 ENCOUNTER — PATIENT OUTREACH (OUTPATIENT)
Dept: CARE COORDINATION | Facility: CLINIC | Age: 87
End: 2022-10-18

## 2022-10-18 NOTE — PROGRESS NOTES
[facility] Contact  [facility]/Voicemail     Clinical Data: Transitional Care Management Outreach     Outreach attempted x 2.  Left message on patient's voicemail, providing [facility]'s 24/7 scheduling and nurse triage phone number [phone] ([phone]) for questions/concerns and/or to schedule an appt with an [facility] provider, if they do not have a PCP.      Plan:  General acute hospital will do no further outreaches at this time.       Maegan Garcia  Community Health Worker  General acute hospital, [facility]  [phone]      *Connected Care Resource Team does NOT follow patient ongoing. Referrals are identified based on internal discharge reports and the outreach is to ensure patient has an understanding of their discharge instructions.

## 2024-06-13 NOTE — ED NOTES
Bed: [facility]ED-06  Expected date: 9/16/22  Expected time: 8:54 PM  Means of arrival:   Comments:  ED 14 FOR BOARDING   Alert and oriented to person, place and time

## 2024-08-24 ENCOUNTER — HOSPITAL ENCOUNTER (INPATIENT)
Facility: CLINIC | Age: 89
LOS: 3 days | Discharge: HOME | DRG: 291 | End: 2024-08-27
Attending: EMERGENCY MEDICINE | Admitting: INTERNAL MEDICINE
Payer: MEDICARE

## 2024-08-24 ENCOUNTER — APPOINTMENT (OUTPATIENT)
Dept: RADIOLOGY | Facility: CLINIC | Age: 89
DRG: 291 | End: 2024-08-24
Attending: EMERGENCY MEDICINE
Payer: MEDICARE

## 2024-08-24 ENCOUNTER — APPOINTMENT (OUTPATIENT)
Dept: CARDIOLOGY | Facility: CLINIC | Age: 89
DRG: 291 | End: 2024-08-24
Attending: INTERNAL MEDICINE
Payer: MEDICARE

## 2024-08-24 DIAGNOSIS — I50.33 ACUTE ON CHRONIC DIASTOLIC CONGESTIVE HEART FAILURE (H): ICD-10-CM

## 2024-08-24 DIAGNOSIS — E87.6 HYPOKALEMIA: ICD-10-CM

## 2024-08-24 DIAGNOSIS — K21.9 GASTROESOPHAGEAL REFLUX DISEASE WITHOUT ESOPHAGITIS: ICD-10-CM

## 2024-08-24 DIAGNOSIS — R11.0 NAUSEA: ICD-10-CM

## 2024-08-24 DIAGNOSIS — I48.91 ATRIAL FIBRILLATION WITH RVR (H): Primary | Chronic | ICD-10-CM

## 2024-08-24 DIAGNOSIS — R09.02 HYPOXIA: ICD-10-CM

## 2024-08-24 PROBLEM — I10 HTN (HYPERTENSION): Status: ACTIVE | Noted: 2022-09-16

## 2024-08-24 PROBLEM — Z66 DNR (DO NOT RESUSCITATE): Chronic | Status: ACTIVE | Noted: 2024-08-24

## 2024-08-24 PROBLEM — Z66 DNR (DO NOT RESUSCITATE): Status: ACTIVE | Noted: 2024-08-24

## 2024-08-24 PROBLEM — J96.01 ACUTE RESPIRATORY FAILURE WITH HYPOXIA (H): Status: ACTIVE | Noted: 2024-08-24

## 2024-08-24 PROBLEM — Z86.73 HISTORY OF STROKE: Status: ACTIVE | Noted: 2024-08-24

## 2024-08-24 PROBLEM — E11.9 DIABETES MELLITUS, TYPE II (H): Status: ACTIVE | Noted: 2023-06-29

## 2024-08-24 PROBLEM — Z79.01 WARFARIN ANTICOAGULATION: Status: ACTIVE | Noted: 2024-08-24

## 2024-08-24 PROBLEM — N30.20 CHRONIC CYSTITIS: Status: ACTIVE | Noted: 2018-12-19

## 2024-08-24 PROBLEM — N18.30 CHRONIC KIDNEY DISEASE, STAGE 3 (H): Status: ACTIVE | Noted: 2022-09-16

## 2024-08-24 PROBLEM — Z79.01 WARFARIN ANTICOAGULATION: Chronic | Status: ACTIVE | Noted: 2024-08-24

## 2024-08-24 PROBLEM — I51.89 DIASTOLIC DYSFUNCTION: Status: ACTIVE | Noted: 2024-08-24

## 2024-08-24 PROBLEM — E11.9 DIABETES MELLITUS, TYPE II (H): Chronic | Status: ACTIVE | Noted: 2023-06-29

## 2024-08-24 PROBLEM — I50.9 CHF (CONGESTIVE HEART FAILURE) (H): Status: ACTIVE | Noted: 2022-09-16

## 2024-08-24 PROBLEM — I50.9 CHF (CONGESTIVE HEART FAILURE) (H): Chronic | Status: ACTIVE | Noted: 2022-09-16

## 2024-08-24 PROBLEM — I42.1 HYPERTROPHIC OBSTRUCTIVE CARDIOMYOPATHY (H): Status: ACTIVE | Noted: 2022-09-16

## 2024-08-24 PROBLEM — J96.01 ACUTE RESPIRATORY FAILURE WITH HYPOXIA (H): Chronic | Status: ACTIVE | Noted: 2024-08-24

## 2024-08-24 LAB
ALBUMIN UR-MCNC: NEGATIVE MG/DL
ANION GAP SERPL CALCULATED.3IONS-SCNC: 15 MMOL/L (ref 7–15)
APPEARANCE UR: CLEAR
ATRIAL RATE - MUSE: 78 BPM
BACTERIA #/AREA URNS HPF: ABNORMAL /HPF
BASOPHILS # BLD AUTO: 0 10E3/UL (ref 0–0.2)
BASOPHILS NFR BLD AUTO: 0 %
BILIRUB UR QL STRIP: NEGATIVE
BUN SERPL-MCNC: 13 MG/DL (ref 8–23)
CALCIUM SERPL-MCNC: 8.6 MG/DL (ref 8.8–10.4)
CHLORIDE SERPL-SCNC: 99 MMOL/L (ref 98–107)
COLOR UR AUTO: COLORLESS
CREAT SERPL-MCNC: 0.88 MG/DL (ref 0.51–0.95)
DIASTOLIC BLOOD PRESSURE - MUSE: 83 MMHG
EGFRCR SERPLBLD CKD-EPI 2021: 61 ML/MIN/1.73M2
EOSINOPHIL # BLD AUTO: 0.1 10E3/UL (ref 0–0.7)
EOSINOPHIL NFR BLD AUTO: 2 %
ERYTHROCYTE [DISTWIDTH] IN BLOOD BY AUTOMATED COUNT: 13.5 % (ref 10–15)
FLUAV RNA SPEC QL NAA+PROBE: NEGATIVE
FLUBV RNA RESP QL NAA+PROBE: NEGATIVE
GLUCOSE SERPL-MCNC: 164 MG/DL (ref 70–99)
GLUCOSE UR STRIP-MCNC: NEGATIVE MG/DL
HCO3 SERPL-SCNC: 30 MMOL/L (ref 22–29)
HCT VFR BLD AUTO: 37.4 % (ref 35–47)
HGB BLD-MCNC: 12.5 G/DL (ref 11.7–15.7)
HGB UR QL STRIP: NEGATIVE
HOLD SPECIMEN: NORMAL
IMM GRANULOCYTES # BLD: 0 10E3/UL
IMM GRANULOCYTES NFR BLD: 1 %
INR PPP: 2.71 (ref 0.85–1.15)
INTERPRETATION ECG - MUSE: NORMAL
KETONES UR STRIP-MCNC: NEGATIVE MG/DL
LEUKOCYTE ESTERASE UR QL STRIP: NEGATIVE
LVEF ECHO: NORMAL
LYMPHOCYTES # BLD AUTO: 1 10E3/UL (ref 0.8–5.3)
LYMPHOCYTES NFR BLD AUTO: 12 %
MCH RBC QN AUTO: 28.8 PG (ref 26.5–33)
MCHC RBC AUTO-ENTMCNC: 33.4 G/DL (ref 31.5–36.5)
MCV RBC AUTO: 86 FL (ref 78–100)
MONOCYTES # BLD AUTO: 0.8 10E3/UL (ref 0–1.3)
MONOCYTES NFR BLD AUTO: 11 %
NEUTROPHILS # BLD AUTO: 5.7 10E3/UL (ref 1.6–8.3)
NEUTROPHILS NFR BLD AUTO: 74 %
NITRATE UR QL: NEGATIVE
NRBC # BLD AUTO: 0 10E3/UL
NRBC BLD AUTO-RTO: 0 /100
NT-PROBNP SERPL-MCNC: 1685 PG/ML (ref 0–1800)
P AXIS - MUSE: NORMAL DEGREES
PH UR STRIP: 7.5 [PH] (ref 5–7)
PLATELET # BLD AUTO: 211 10E3/UL (ref 150–450)
POTASSIUM SERPL-SCNC: 3.3 MMOL/L (ref 3.4–5.3)
POTASSIUM SERPL-SCNC: 4 MMOL/L (ref 3.4–5.3)
PR INTERVAL - MUSE: NORMAL MS
QRS DURATION - MUSE: 74 MS
QT - MUSE: 360 MS
QTC - MUSE: 462 MS
R AXIS - MUSE: 90 DEGREES
RBC # BLD AUTO: 4.34 10E6/UL (ref 3.8–5.2)
RBC URINE: <1 /HPF
RSV RNA SPEC NAA+PROBE: NEGATIVE
SARS-COV-2 RNA RESP QL NAA+PROBE: NEGATIVE
SODIUM SERPL-SCNC: 144 MMOL/L (ref 135–145)
SP GR UR STRIP: 1.01 (ref 1–1.03)
SQUAMOUS EPITHELIAL: <1 /HPF
SYSTOLIC BLOOD PRESSURE - MUSE: 139 MMHG
T AXIS - MUSE: 20 DEGREES
TROPONIN T SERPL HS-MCNC: 17 NG/L
TROPONIN T SERPL HS-MCNC: 19 NG/L
UROBILINOGEN UR STRIP-MCNC: <2 MG/DL
VENTRICULAR RATE- MUSE: 99 BPM
WBC # BLD AUTO: 7.7 10E3/UL (ref 4–11)
WBC URINE: 0 /HPF

## 2024-08-24 PROCEDURE — 87637 SARSCOV2&INF A&B&RSV AMP PRB: CPT | Performed by: EMERGENCY MEDICINE

## 2024-08-24 PROCEDURE — 250N000011 HC RX IP 250 OP 636: Performed by: INTERNAL MEDICINE

## 2024-08-24 PROCEDURE — 93005 ELECTROCARDIOGRAM TRACING: CPT | Performed by: EMERGENCY MEDICINE

## 2024-08-24 PROCEDURE — 99222 1ST HOSP IP/OBS MODERATE 55: CPT | Mod: AI | Performed by: INTERNAL MEDICINE

## 2024-08-24 PROCEDURE — 80048 BASIC METABOLIC PNL TOTAL CA: CPT | Performed by: EMERGENCY MEDICINE

## 2024-08-24 PROCEDURE — 250N000013 HC RX MED GY IP 250 OP 250 PS 637: Performed by: EMERGENCY MEDICINE

## 2024-08-24 PROCEDURE — 71046 X-RAY EXAM CHEST 2 VIEWS: CPT

## 2024-08-24 PROCEDURE — 36415 COLL VENOUS BLD VENIPUNCTURE: CPT | Performed by: EMERGENCY MEDICINE

## 2024-08-24 PROCEDURE — 81001 URINALYSIS AUTO W/SCOPE: CPT | Performed by: EMERGENCY MEDICINE

## 2024-08-24 PROCEDURE — 93306 TTE W/DOPPLER COMPLETE: CPT | Mod: 26 | Performed by: INTERNAL MEDICINE

## 2024-08-24 PROCEDURE — 120N000004 HC R&B MS OVERFLOW

## 2024-08-24 PROCEDURE — 99285 EMERGENCY DEPT VISIT HI MDM: CPT | Mod: 25

## 2024-08-24 PROCEDURE — 84132 ASSAY OF SERUM POTASSIUM: CPT | Performed by: INTERNAL MEDICINE

## 2024-08-24 PROCEDURE — 93306 TTE W/DOPPLER COMPLETE: CPT

## 2024-08-24 PROCEDURE — 85025 COMPLETE CBC W/AUTO DIFF WBC: CPT | Performed by: EMERGENCY MEDICINE

## 2024-08-24 PROCEDURE — 83880 ASSAY OF NATRIURETIC PEPTIDE: CPT | Performed by: EMERGENCY MEDICINE

## 2024-08-24 PROCEDURE — 84484 ASSAY OF TROPONIN QUANT: CPT | Performed by: EMERGENCY MEDICINE

## 2024-08-24 PROCEDURE — 250N000013 HC RX MED GY IP 250 OP 250 PS 637: Performed by: INTERNAL MEDICINE

## 2024-08-24 PROCEDURE — 85610 PROTHROMBIN TIME: CPT | Performed by: EMERGENCY MEDICINE

## 2024-08-24 PROCEDURE — 36415 COLL VENOUS BLD VENIPUNCTURE: CPT | Performed by: INTERNAL MEDICINE

## 2024-08-24 RX ORDER — POTASSIUM CHLORIDE 1.5 G/1.58G
40 POWDER, FOR SOLUTION ORAL ONCE
Status: COMPLETED | OUTPATIENT
Start: 2024-08-24 | End: 2024-08-24

## 2024-08-24 RX ORDER — FAMOTIDINE 20 MG/1
20 TABLET, FILM COATED ORAL 2 TIMES DAILY
Status: DISCONTINUED | OUTPATIENT
Start: 2024-08-24 | End: 2024-08-25

## 2024-08-24 RX ORDER — CALCIUM CARBONATE 500(1250)
500 TABLET ORAL 2 TIMES DAILY WITH MEALS
Status: DISCONTINUED | OUTPATIENT
Start: 2024-08-24 | End: 2024-08-27 | Stop reason: HOSPADM

## 2024-08-24 RX ORDER — VITAMIN B COMPLEX
25 TABLET ORAL AT BEDTIME
Status: DISCONTINUED | OUTPATIENT
Start: 2024-08-24 | End: 2024-08-27 | Stop reason: HOSPADM

## 2024-08-24 RX ORDER — AMOXICILLIN 250 MG
1 CAPSULE ORAL AT BEDTIME
Status: DISCONTINUED | OUTPATIENT
Start: 2024-08-24 | End: 2024-08-27 | Stop reason: HOSPADM

## 2024-08-24 RX ORDER — FLUOCINONIDE TOPICAL SOLUTION USP, 0.05% 0.5 MG/ML
SOLUTION TOPICAL 2 TIMES DAILY PRN
COMMUNITY

## 2024-08-24 RX ORDER — LIDOCAINE 40 MG/G
CREAM TOPICAL
Status: DISCONTINUED | OUTPATIENT
Start: 2024-08-24 | End: 2024-08-27 | Stop reason: HOSPADM

## 2024-08-24 RX ORDER — ONDANSETRON 2 MG/ML
4 INJECTION INTRAMUSCULAR; INTRAVENOUS EVERY 6 HOURS PRN
Status: DISCONTINUED | OUTPATIENT
Start: 2024-08-24 | End: 2024-08-27 | Stop reason: HOSPADM

## 2024-08-24 RX ORDER — ACETAMINOPHEN 325 MG/1
650 TABLET ORAL EVERY 4 HOURS PRN
Status: DISCONTINUED | OUTPATIENT
Start: 2024-08-24 | End: 2024-08-27 | Stop reason: HOSPADM

## 2024-08-24 RX ORDER — ONDANSETRON 4 MG/1
4 TABLET, ORALLY DISINTEGRATING ORAL EVERY 6 HOURS PRN
Status: DISCONTINUED | OUTPATIENT
Start: 2024-08-24 | End: 2024-08-27 | Stop reason: HOSPADM

## 2024-08-24 RX ORDER — BUMETANIDE 2 MG/1
2 TABLET ORAL
Status: DISCONTINUED | OUTPATIENT
Start: 2024-08-24 | End: 2024-08-25

## 2024-08-24 RX ORDER — DILTIAZEM HYDROCHLORIDE 240 MG/1
240 CAPSULE, EXTENDED RELEASE ORAL EVERY EVENING
Status: DISCONTINUED | OUTPATIENT
Start: 2024-08-24 | End: 2024-08-27 | Stop reason: HOSPADM

## 2024-08-24 RX ORDER — POTASSIUM CHLORIDE 1500 MG/1
20 TABLET, EXTENDED RELEASE ORAL ONCE
Status: COMPLETED | OUTPATIENT
Start: 2024-08-24 | End: 2024-08-24

## 2024-08-24 RX ORDER — WARFARIN SODIUM 2.5 MG/1
2.5 TABLET ORAL SEE ADMIN INSTRUCTIONS
COMMUNITY

## 2024-08-24 RX ORDER — ATORVASTATIN CALCIUM 10 MG/1
20 TABLET, FILM COATED ORAL
Status: DISCONTINUED | OUTPATIENT
Start: 2024-08-25 | End: 2024-08-27 | Stop reason: HOSPADM

## 2024-08-24 RX ORDER — IBUPROFEN 200 MG
1 CAPSULE ORAL 2 TIMES DAILY
Status: DISCONTINUED | OUTPATIENT
Start: 2024-08-24 | End: 2024-08-24

## 2024-08-24 RX ORDER — CALCIUM CARBONATE 500 MG/1
1000 TABLET, CHEWABLE ORAL 4 TIMES DAILY PRN
Status: DISCONTINUED | OUTPATIENT
Start: 2024-08-24 | End: 2024-08-27 | Stop reason: HOSPADM

## 2024-08-24 RX ORDER — METOPROLOL TARTRATE 50 MG
100 TABLET ORAL 2 TIMES DAILY
Status: DISCONTINUED | OUTPATIENT
Start: 2024-08-24 | End: 2024-08-27 | Stop reason: HOSPADM

## 2024-08-24 RX ORDER — ACETAMINOPHEN 650 MG/1
650 SUPPOSITORY RECTAL EVERY 4 HOURS PRN
Status: DISCONTINUED | OUTPATIENT
Start: 2024-08-24 | End: 2024-08-27 | Stop reason: HOSPADM

## 2024-08-24 RX ORDER — AMOXICILLIN 250 MG
2 CAPSULE ORAL 2 TIMES DAILY PRN
Status: DISCONTINUED | OUTPATIENT
Start: 2024-08-24 | End: 2024-08-27 | Stop reason: HOSPADM

## 2024-08-24 RX ORDER — TAMSULOSIN HYDROCHLORIDE 0.4 MG/1
0.4 CAPSULE ORAL AT BEDTIME
Status: DISCONTINUED | OUTPATIENT
Start: 2024-08-24 | End: 2024-08-27 | Stop reason: HOSPADM

## 2024-08-24 RX ORDER — AMOXICILLIN 250 MG
1 CAPSULE ORAL 2 TIMES DAILY PRN
Status: DISCONTINUED | OUTPATIENT
Start: 2024-08-24 | End: 2024-08-27 | Stop reason: HOSPADM

## 2024-08-24 RX ORDER — WARFARIN SODIUM 2.5 MG/1
3.75 TABLET ORAL SEE ADMIN INSTRUCTIONS
COMMUNITY

## 2024-08-24 RX ORDER — ESTRADIOL 0.1 MG/G
1 CREAM VAGINAL DAILY PRN
Status: DISCONTINUED | OUTPATIENT
Start: 2024-08-24 | End: 2024-08-27 | Stop reason: HOSPADM

## 2024-08-24 RX ORDER — POLYETHYLENE GLYCOL 3350 17 G/17G
17 POWDER, FOR SOLUTION ORAL 2 TIMES DAILY PRN
Status: DISCONTINUED | OUTPATIENT
Start: 2024-08-24 | End: 2024-08-27 | Stop reason: HOSPADM

## 2024-08-24 RX ORDER — WARFARIN SODIUM 2.5 MG/1
2.5 TABLET ORAL
Status: DISCONTINUED | OUTPATIENT
Start: 2024-08-24 | End: 2024-08-27 | Stop reason: HOSPADM

## 2024-08-24 RX ORDER — ASCORBIC ACID 500 MG
1000 TABLET ORAL 2 TIMES DAILY
Status: DISCONTINUED | OUTPATIENT
Start: 2024-08-24 | End: 2024-08-27 | Stop reason: HOSPADM

## 2024-08-24 RX ORDER — DIPHENHYDRAMINE HCL 25 MG
12.5 TABLET ORAL EVERY 6 HOURS PRN
Status: DISCONTINUED | OUTPATIENT
Start: 2024-08-24 | End: 2024-08-26

## 2024-08-24 RX ORDER — BETAMETHASONE DIPROPIONATE 0.5 MG/G
CREAM TOPICAL 2 TIMES DAILY PRN
Status: DISCONTINUED | OUTPATIENT
Start: 2024-08-24 | End: 2024-08-27 | Stop reason: HOSPADM

## 2024-08-24 RX ADMIN — BUMETANIDE 2 MG: 2 TABLET ORAL at 15:55

## 2024-08-24 RX ADMIN — DILTIAZEM HYDROCHLORIDE 240 MG: 240 CAPSULE, EXTENDED RELEASE ORAL at 19:46

## 2024-08-24 RX ADMIN — Medication 25 MCG: at 21:12

## 2024-08-24 RX ADMIN — POTASSIUM CHLORIDE 20 MEQ: 1500 TABLET, EXTENDED RELEASE ORAL at 14:03

## 2024-08-24 RX ADMIN — FAMOTIDINE 20 MG: 20 TABLET, FILM COATED ORAL at 19:46

## 2024-08-24 RX ADMIN — POTASSIUM CHLORIDE 40 MEQ: 1.5 POWDER, FOR SOLUTION ORAL at 18:36

## 2024-08-24 RX ADMIN — ACETAMINOPHEN 650 MG: 325 TABLET ORAL at 21:12

## 2024-08-24 RX ADMIN — CALCIUM 500 MG: 500 TABLET ORAL at 17:45

## 2024-08-24 RX ADMIN — OXYCODONE HYDROCHLORIDE AND ACETAMINOPHEN 1000 MG: 500 TABLET ORAL at 19:46

## 2024-08-24 RX ADMIN — PROCHLORPERAZINE EDISYLATE 5 MG: 5 INJECTION INTRAMUSCULAR; INTRAVENOUS at 21:42

## 2024-08-24 RX ADMIN — ONDANSETRON 4 MG: 4 TABLET, ORALLY DISINTEGRATING ORAL at 19:47

## 2024-08-24 RX ADMIN — TAMSULOSIN HYDROCHLORIDE 0.4 MG: 0.4 CAPSULE ORAL at 21:12

## 2024-08-24 RX ADMIN — METOPROLOL TARTRATE 100 MG: 50 TABLET, FILM COATED ORAL at 19:46

## 2024-08-24 RX ADMIN — WARFARIN SODIUM 2.5 MG: 2.5 TABLET ORAL at 17:45

## 2024-08-24 ASSESSMENT — COLUMBIA-SUICIDE SEVERITY RATING SCALE - C-SSRS
2. HAVE YOU ACTUALLY HAD ANY THOUGHTS OF KILLING YOURSELF IN THE PAST MONTH?: NO
1. IN THE PAST MONTH, HAVE YOU WISHED YOU WERE DEAD OR WISHED YOU COULD GO TO SLEEP AND NOT WAKE UP?: NO
6. HAVE YOU EVER DONE ANYTHING, STARTED TO DO ANYTHING, OR PREPARED TO DO ANYTHING TO END YOUR LIFE?: NO

## 2024-08-24 ASSESSMENT — ACTIVITIES OF DAILY LIVING (ADL)
ADLS_ACUITY_SCORE: 26
ADLS_ACUITY_SCORE: 38
ADLS_ACUITY_SCORE: 26
ADLS_ACUITY_SCORE: 38
ADLS_ACUITY_SCORE: 38
ADLS_ACUITY_SCORE: 26
ADLS_ACUITY_SCORE: 38
ADLS_ACUITY_SCORE: 26

## 2024-08-24 NOTE — ED PROVIDER NOTES
EMERGENCY DEPARTMENT ENCOUNTER      NAME: Alva Dumont  AGE: 89 year old female  YOB: 1929  MRN: [id number]  EVALUATION DATE & TIME: 8/24/2024 11:49 AM    PCP: Martin Stauffer    ED PROVIDER: Gage Parkinson M.D.      Chief Complaint   Patient presents with    Shortness of Breath    Diarrhea    Cough         FINAL IMPRESSION:  1. Hypoxia    2. Hypokalemia          ED COURSE & MEDICAL DECISION MAKING:    Pertinent Labs & Imaging studies reviewed. (See chart for details)  89 year old female presents to the Emergency Department for evaluation of shortness of breath. Patient appears non toxic, patient afebrile, did note intermittent mild tachycardia, patient was hypoxic to 87% at at triage, saturating well now on supplemental oxygen.  Exam significant for trace edema and distant breath sounds.  Per review of the medical record, did review progress note on 4/4/2024 through [facility].  Patient noted to have a history of atrial fibrillation, heart failure, acute chronic kidney disease, further recommendations from this note patient was continued on diltiazem, warfarin and Bumex.  Here today with shortness of breath and trace edema, concern certainly for exacerbation of heart failure, no chest pain, pleurisy, no fever or new cough.  Considered but low suspicion for ACS, COVID, pneumonia, no wheezing to suggest COPD or other restrictive lung process, clinically nothing suggest PE or dissection.  We will obtain screening labs, ECG, chest x-ray.  Considered CT imaging of the chest however with no chest pain, pleurisy, nothing to suggest PE at this time.    Reassessment: Labs by my independent interpretation showed low potassium at 3.3 and we did replete here, otherwise no signs of acute kidney injury with a creatinine of 0.88 no signs of anemia with a hemoglobin of 12.5.  BNP not markedly elevated, troponin of 19, we will obtain delta troponin.  INR within therapeutic range at  2.71.  COVID influenza, RSV negative.  Chest x-ray returned reported persistent pulmonary vascular congestion with likely mild interstitial edema, small bilateral pleural effusions certainly suggesting worsening pulmonary hypertension or CHF.  Ultimately with her new oxygen demand and hypoxia I feel the patient benefit from admission for continuous supplemental oxygen and continued workup.  Discussed these findings and need for admission with patient and family and they are in agreement.  Discussed care plan with the admitting service.    11:56 PM I met with the patient, obtained history, performed an initial exam, and discussed options and plan for diagnostics and treatment here in the ED.  1:10 PM Repeat exam, unchanged. Discussed findings and need for admission.   1:45 PM discussed patient case with Dr. Traore, admitting service      Medical Decision Making  Obtained supplemental history:Supplemental history obtained?: Documented in chart and Family Member/Significant Other  Reviewed external records: External records reviewed?: Documented in chart  Care impacted by chronic illness:Chronic Kidney Disease, Diabetes, Heart Disease, and Hypertension  Care significantly affected by social determinants of health:N/A  Did you consider but not order tests?: Work up considered but not performed and documented in chart, if applicable  Did you interpret images independently?: Independent interpretation of ECG and images noted in documentation, when applicable.  Consultation discussion with other provider:Did you involve another provider (consultant, MH, pharmacy, etc.)?: I discussed the care with another health care provider, see documentation for details.  Admit.  No MIPS measures identified.        At the conclusion of the encounter I discussed the results of all of the tests and the disposition. The questions were answered and return precautions provided. The patient or family acknowledged understanding and was  agreeable with the care plan.         MEDICATIONS GIVEN IN THE EMERGENCY:  Medications   potassium chloride ramon ER (KLOR-CON M20) CR tablet 20 mEq (has no administration in time range)       NEW PRESCRIPTIONS STARTED AT TODAY'S ER VISIT  New Prescriptions    No medications on file            =================================================================    HPI    Patient information was obtained from: patient and patient's daughter    Use of Intrepreter: N/A       Alva Dumont is a 89 year old female who presents shortness of breath. Patient woke up this morning feeling nervous, short of breathe, and nauseous. Patient also had an increase in lower extremity edema bilaterally. These are not normal for the patient so her daughter, whom she lives with, brought her to Urgent care in [place]. They did and ECG and recommenced she go to the ED. Patient has also had diarrhea for the past 3 days as well as a productive cough for the past few weeks. Patient is not on O2 at home but was put on oxygen upon arrival to ED. Patient denies chest pain, vomiting, fever, hematuria, or blood in stools.     Patient reports having a recent change in her Warfarin medication, the dosage was recently lowered due to her recently having an INR of 3.5.      PAST MEDICAL HISTORY:  Past Medical History:   Diagnosis Date    Adjustment reaction with anxiety and depression     Atrial fibrillation with RVR (H)     Cervical spondylosis with radiculopathy     CHF (congestive heart failure) (H)     Chronic back pain     Chronic kidney disease, stage 3 (H)     CVA (cerebral infarction)     Incidental old small infarcts seen on MRI 7/2004    Frequent UTI 7/31/12    had pseudomonas UTI and required caftazidime IV; cystitis cystica on cystoscopy    HTN (hypertension)     Hypertrophic obstructive cardiomyopathy (H)     Hypochloremia     Hypokalemia     Hyponatremia     Hyponatremia     Insomnia     Left wrist fracture 2002    Lumbar spinal stenosis      Macular degeneration     Occlusion and stenosis of carotid artery without mention of cerebral infarction 6/11/2008    Right carotid on US-moderate    Osteoporosis     Pure hypercholesterolemia     Sciatica     Senile cataract        PAST SURGICAL HISTORY:  Past Surgical History:   Procedure Laterality Date    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    FRACTURE SURGERY Left     wrist    LUMBAR LAMINECTOMY  12/2009    fusion    VAGINAL DELIVERY      x4         CURRENT MEDICATIONS:    Prior to Admission medications    Medication Sig Start Date End Date Taking? Authorizing Provider   acetaminophen (TYLENOL) 500 MG tablet Take 500 mg by mouth At Bedtime    Unknown, Entered By History   atorvastatin (LIPITOR) 20 MG tablet Take 20 mg by mouth daily (with lunch)    Unknown, Entered By History   benzonatate (TESSALON) 100 MG capsule Take 1 capsule (100 mg) by mouth 3 times daily as needed for cough 9/22/22   Chetna Conklin MD   betamethasone dipropionate (DIPROSONE) 0.05 % external cream Apply topically 2 times daily as needed    Unknown, Entered By History   bumetanide (BUMEX) 1 MG tablet Take 2 mg by mouth daily before breakfast    Unknown, Entered By History   bumetanide (BUMEX) 1 MG tablet Take 1 mg by mouth At Bedtime    Unknown, Entered By History   calcium carbonate 500 mg (elemental) (OSCAL 500) 1250 (500 Ca) MG TABS tablet Take 1 tablet by mouth 3 times daily    Unknown, Entered By History   cephALEXin (KEFLEX) 250 MG capsule Take 250 mg by mouth At Bedtime    Unknown, Entered By History   cholecalciferol 25 MCG (1000 UT) TABS Take 1,000 Units by mouth At Bedtime    Unknown, Entered By History   diltiazem ER (DILT-XR) 180 MG 24 hr capsule Take 180 mg by mouth every evening    Unknown, Entered By History   diphenhydrAMINE (BENADRYL) 25 MG tablet Take 0.5 tablets (12.5 mg) by mouth every 6 hours as needed for itching or allergies 4/30/22   Ivan Avalos MD   estradiol (ESTRACE) 0.1 MG/GM vaginal cream  "Place 1 g vaginally as needed    Unknown, Entered By History   famotidine (PEPCID) 20 MG tablet Take 20 mg by mouth 2 times daily    Unknown, Entered By History   guaiFENesin (MUCINEX) 600 MG 12 hr tablet Take 1 tablet (600 mg) by mouth 2 times daily as needed 10/15/22   Nadeem Zimmer MD   metoprolol tartrate (LOPRESSOR) 50 MG tablet Take 75 mg by mouth 2 times daily    Unknown, Entered By History   Multiple Vitamins-Minerals (PRESERVISION AREDS 2+MULTI VIT) CAPS Take 1 capsule by mouth every evening    Unknown, Entered By History   senna-docusate (SENOKOT-S/PERICOLACE) 8.6-50 MG tablet Take 1 tablet by mouth At Bedtime    Unknown, Entered By History   tamsulosin (FLOMAX) 0.4 MG capsule Take 1 capsule (0.4 mg) by mouth At Bedtime 4/30/22   Ivan Avalos MD   vitamin C (ASCORBIC ACID) 1000 MG TABS Take 1,000 mg by mouth 3 times daily    Unknown, Entered By History   warfarin ANTICOAGULANT (COUMADIN) 2.5 MG tablet Take 1 tablet (2.5 mg) by mouth At Bedtime 9/23/22   Chetna Conklin MD   losartan (COZAAR) 100 MG tablet Take 100 mg by mouth daily  9/22/22  Unknown, Entered By History        ALLERGIES:  Allergies   Allergen Reactions    Cefprozil Shortness Of Breath    Oxycodone-Acetaminophen Itching    Penicillins Nausea and Rash     Has had different cephalosporins in past , tolerates Augmentin ok if benadryl is given together (last given 12/2018)  Tolerated Zosyn 6/2019.   Has had different cephalosporins in past      Sulfa Antibiotics Hives and Other (See Comments)     Other reaction(s): Edema, Other (See Comments)  Swelling in hands and feet    Swelling in hands and feet  Other Reaction(s): Edema  Swelling in hands and feet      Ciprofloxacin Hives    Clonidine Other (See Comments)     Extreme Fatigue  Extreme Fatigue      Codeine Other (See Comments)     \"felt wired\"    \"felt wired\"      Levofloxacin Other (See Comments)     agitation      Azithromycin Nausea and Diarrhea    Cefaclor     Hydrochlorothiazide " Nausea    Nitrofurantoin     Oxycodone Itching    Spironolactone Other (See Comments)     Patient can't remember what happens    Zestril [Lisinopril] Other (See Comments)     Patient can not remember what happens    Amoxicillin Hives and Rash     Has had different cephalosporins in past    Aspirin Unknown and Other (See Comments)     Other reaction(s): Other (See Comments)  Contraindication with Coumadin Therapy    Contraindication with Coumadin Therapy  Contraindication with Coumadin Therapy         FAMILY HISTORY:  Family History   Problem Relation Age of Onset    Breast Cancer Mother     Hypertension Sister     Alcoholism Brother         and Drug    Cancer Son         pancreatic    Aneurysm Brother     Other - See Comments Brother         renal failure       SOCIAL HISTORY:   Social History     Socioeconomic History    Marital status:    Tobacco Use    Smoking status: Never    Smokeless tobacco: Never   Substance and Sexual Activity    Alcohol use: No    Drug use: No     Social Determinants of Health     Financial Resource Strain: Low Risk  (5/20/2022)    Received from [facility]    Financial Resource Strain     Difficulty of Paying Living Expenses: 3   Food Insecurity: No Food Insecurity (5/20/2022)    Received from [facility]    Food Insecurity     Worried About Running Out of Food in the Last Year: 1   Transportation Needs: No Transportation Needs (5/20/2022)    Received from [facility]    Transportation Needs     Lack of Transportation (Medical): 1   Physical Activity: Not on File (12/4/2021)    Received from [facility]    Physical Activity     Physical Activity: 0   Stress: Not on File (12/4/2021)    Received from [facility]    Stress     Stress: 0    Received from [facility]    Social Connections   Housing Stability: Low Risk  (5/20/2022)    [facility]    Housing Stability     Unable to Pay for Housing in the Last Year: 1       VITALS:  Patient Vitals for the past 24 hrs:   BP Temp Pulse Resp SpO2 Height Weight   08/24/24 1330 (!) 172/77 -- 105 -- 97 % -- --   08/24/24 1144 -- -- -- -- 91 % -- --   08/24/24 1137 139/83 98.4  F (36.9  C) 105 30 (!) 87 % 1.448 m (4' 9\") 53 kg (116 lb 12.8 oz)        PHYSICAL EXAM     Constitutional:  Awake, alert, in no apparent distress  HENT:  Normocephalic, Atraumatic. Bilateral external ears normal. Oropharynx moist. Nose normal. Neck- Normal range of motion with no guarding, No midline cervical tenderness, Supple, No stridor.   Eyes:  PERRL, EOMI with no signs of entrapment, Conjunctiva normal, No discharge.   Respiratory: Distant breath sounds, no crackles  Cardiovascular:  Normal heart rate, irregular rhythm, No appreciable rubs or gallops.   GI:  Soft, No tenderness, No distension, No palpable masses  Musculoskeletal:  Intact distal pulses, trace pitting edema bilateral lower extremities.  No major deformities noted  Integument:  Warm, Dry, No erythema, No rash.   Neurologic:  Alert & oriented, Normal motor function, Normal sensory function, No focal deficits noted.   Psychiatric:  Affect normal, Judgment normal, Mood normal.     LAB:  All pertinent labs reviewed and interpreted.  Results for orders placed or performed during the hospital encounter of 08/24/24   XR Chest 2 Views    Impression    IMPRESSION: Stable enlargement of the cardiac silhouette. Persistent pulmonary vascular congestion with likely mild interstitial edema. Stable small bilateral pleural effusions, right larger than left. No pneumothorax. No acute bony abnormality.   Extra Blue Top Tube   Result Value Ref Range    Hold Specimen JIC    Extra Green Top (Lithium Heparin) Tube   Result Value Ref Range    Hold Specimen JIC    Extra Purple Top Tube   Result Value Ref Range    Hold Specimen     Basic metabolic panel   Result Value Ref " Range    Sodium 144 135 - 145 mmol/L    Potassium 3.3 (L) 3.4 - 5.3 mmol/L    Chloride 99 98 - 107 mmol/L    Carbon Dioxide (CO2) 30 (H) 22 - 29 mmol/L    Anion Gap 15 7 - 15 mmol/L    Urea Nitrogen 13.0 8.0 - 23.0 mg/dL    Creatinine 0.88 0.51 - 0.95 mg/dL    GFR Estimate 61 >60 mL/min/1.73m2    Calcium 8.6 (L) 8.8 - 10.4 mg/dL    Glucose 164 (H) 70 - 99 mg/dL   Result Value Ref Range    INR 2.71 (H) 0.85 - 1.15   Result Value Ref Range    Troponin T, High Sensitivity 19 (H) <=14 ng/L   Nt probnp inpatient (BNP)   Result Value Ref Range    N terminal Pro BNP Inpatient 1,685 0 - 1,800 pg/mL   UA with Microscopic reflex to Culture    Specimen: Urine, Midstream   Result Value Ref Range    Color Urine Colorless Colorless, Straw, Light Yellow, Yellow    Appearance Urine Clear Clear    Glucose Urine Negative Negative mg/dL    Bilirubin Urine Negative Negative    Ketones Urine Negative Negative mg/dL    Specific Gravity Urine 1.008 1.001 - 1.030    Blood Urine Negative Negative    pH Urine 7.5 (H) 5.0 - 7.0    Protein Albumin Urine Negative Negative mg/dL    Urobilinogen Urine <2.0 <2.0 mg/dL    Nitrite Urine Negative Negative    Leukocyte Esterase Urine Negative Negative    Bacteria Urine Few (A) None Seen /HPF    RBC Urine <1 <=2 /HPF    WBC Urine 0 <=5 /HPF    Squamous Epithelials Urine <1 <=1 /HPF   Symptomatic Influenza A/B, RSV, & SARS-CoV2 PCR (COVID-19) Nasopharyngeal    Specimen: Nasopharyngeal; Swab   Result Value Ref Range    Influenza A PCR Negative Negative    Influenza B PCR Negative Negative    RSV PCR Negative Negative    SARS CoV2 PCR Negative Negative   CBC with platelets and differential   Result Value Ref Range    WBC Count 7.7 4.0 - 11.0 10e3/uL    RBC Count 4.34 3.80 - 5.20 10e6/uL    Hemoglobin 12.5 11.7 - 15.7 g/dL    Hematocrit 37.4 35.0 - 47.0 %    MCV 86 78 - 100 fL    MCH 28.8 26.5 - 33.0 pg    MCHC 33.4 31.5 - 36.5 g/dL    RDW 13.5 10.0 - 15.0 %    Platelet Count 211 150 - 450 10e3/uL    %  Neutrophils 74 %    % Lymphocytes 12 %    % Monocytes 11 %    % Eosinophils 2 %    % Basophils 0 %    % Immature Granulocytes 1 %    NRBCs per 100 WBC 0 <1 /100    Absolute Neutrophils 5.7 1.6 - 8.3 10e3/uL    Absolute Lymphocytes 1.0 0.8 - 5.3 10e3/uL    Absolute Monocytes 0.8 0.0 - 1.3 10e3/uL    Absolute Eosinophils 0.1 0.0 - 0.7 10e3/uL    Absolute Basophils 0.0 0.0 - 0.2 10e3/uL    Absolute Immature Granulocytes 0.0 <=0.4 10e3/uL    Absolute NRBCs 0.0 10e3/uL       RADIOLOGY:  XR Chest 2 Views   Final Result   IMPRESSION: Stable enlargement of the cardiac silhouette. Persistent pulmonary vascular congestion with likely mild interstitial edema. Stable small bilateral pleural effusions, right larger than left. No pneumothorax. No acute bony abnormality.             EKG:    Atrial fibrillation, no specific ST acute ischemic changes, no concerning dysrhythmias and for punctation, compared to ECG of September 16, 2022, no specific ST acute ischemic change appreciated  I have independently reviewed and interpreted the EKG(s) documented above.    PROCEDURES:        [employer] [place] [facility] Documentation:   CMS Diagnoses:       I, Irmamckenna Moore, am serving as a scribe to document services personally performed by Gage Parkinson MD, based on my observation and the provider's statements to me. I, Gage Parkinson MD attest that Irma Moore is acting in a scribe capacity, has observed my performance of the services and has documented them in accordance with my direction.    Gage Parkinson M.D.  Emergency Medicine  [facility] EMERGENCY ROOM  [address] [facility] [id number] [phone]  Dept: [phone]       Gage Parkinson MD  08/24/24 [id number]

## 2024-08-24 NOTE — PLAN OF CARE
Goal Outcome Evaluation:      Plan of Care Reviewed With: patient    Overall Patient Progress: improvingOverall Patient Progress: improving    Outcome Evaluation: HTN when BP taken after activity. Denies CP/HA. Remains on 2L O2 to maintain sats >92%. Up with SBA and Roling walker. AFib with -140 with activity. Low 100s at rest. Denies palpatations. Will replace K+ per protocol.      Problem: Adult Inpatient Plan of Care  Goal: Optimal Comfort and Wellbeing  Outcome: Progressing     Problem: Gas Exchange Impaired  Goal: Optimal Gas Exchange  Outcome: Progressing     Problem: Risk for Delirium  Goal: Improved Attention and Thought Clarity  Outcome: Progressing

## 2024-08-24 NOTE — H&P
[facility]    History and Physical - Hospitalist Service       Date of Admission:  8/24/2024    Assessment & Plan      Alva Dumont is a 89 year old female admitted on 8/24/2024. She lives with her daughter and extended family in [place] she will be 89 next week fairly high functioning.  Long history of atrial fibrillation warfarin anticoagulation, CHF on Bumex, she sees cardiology with [facility], with some consideration of hypertrophic cardiomyopathy in past per problem list, history of remote stroke minimal residual  She presented with increasing shortness of breath the emergency room she describes symptoms starting about 1 to 2 weeks ago no fever no chills or related symptoms no productive cough in the ER she was noted to be somewhat hypoxic on room air and she is being admitted for further evaluation and treatment    #1 hypoxia without evidence of pneumonia or active infection probably multifactorial associated with her heart disease  Plan continue to monitor oxygen supplementation as necessary to keep O2 sats greater than 92%    #2 chronic congestive heart failure diastolic dysfunction atrial fibrillation with rapid ventricular response remote history of hypertrophic cardiomyopathy possibly   Plan adjust Bumex upward carefully continue with warfarin anticoagulation with pharmacy guidance increase the metoprolol to 100 mg continue to cardiac monitor mild elevation of troponin felt to be not significant will recheck in a.m.  Cardiac echo to check on status of ventricle    #3 CODE STATUS DNR confirmed with patient and her daughter    #4 hypokalemia related to Bumex diuretic therapy potassium supplement and standing orders with potassium protocol          Principal Problem:    Acute respiratory failure with hypoxia (H)    Date Noted: 8/24/2024  Active Problems:    Hypoxia    Date Noted: 8/24/2024    Atrial fibrillation with RVR (H)    Date Noted: 9/16/2022    CHF (congestive heart  "failure) (H)    Date Noted: 9/16/2022    Chronic kidney disease, stage 3 (H)    Date Noted: 9/16/2022    HTN (hypertension)    Date Noted: 9/16/2022    Hypertrophic obstructive cardiomyopathy (H)    Date Noted: 9/16/2022    DNR (do not resuscitate)    Date Noted: 8/24/2024    Hypokalemia    Date Noted: 8/24/2024    Warfarin anticoagulation    Date Noted: 8/24/2024    History of stroke    Date Noted: 8/24/2024            Diet: Combination Diet Low Saturated Fat Na <2400mg Diet    DVT Prophylaxis: Warfarin  Short Catheter: Not present  Lines: None     Cardiac Monitoring: ACTIVE order. Indication: Acute decompensated heart failure (48 hours)  Code Status: No CPR- Do NOT Intubate      Clinically Significant Risk Factors Present on Admission        # Hypokalemia: Lowest K = 3.3 mmol/L in last 2 days, will replace as needed        # Drug Induced Coagulation Defect: home medication list includes an anticoagulant medication    # Hypertension: Noted on problem list         # Overweight: Estimated body mass index is 25.28 kg/m  as calculated from the following:    Height as of this encounter: 1.448 m (4' 9\").    Weight as of this encounter: 53 kg (116 lb 12.8 oz).                    Disposition Plan     Medically Ready for Discharge: Anticipated in 2-4 Days           Binh Traore MD  Hospitalist Service  [facility]  Securely message with [employer] (more info)  Text page via [employer] Paging/Directory     ______________________________________________________________________    Chief Complaint   Shortness of breath    History is obtained from the patient    History of Present Illness     Alva Dumont is a 89 year old female admitted on 8/24/2024. She lives with her daughter and extended family in [place] she will be 89 next week fairly high functioning.  Long history of atrial fibrillation warfarin anticoagulation, CHF on Bumex, she sees cardiology with [facility], with some consideration of " hypertrophic cardiomyopathy in past per problem list, history of remote stroke minimal residual  She presented with increasing shortness of breath the emergency room she describes symptoms starting about 1 to 2 weeks ago no fever no chills or related symptoms no productive cough in the ER she was noted to be somewhat hypoxic on room air and she is being admitted for further evaluation and treatment      Past Medical History    Past Medical History:   Diagnosis Date    Adjustment reaction with anxiety and depression     Atrial fibrillation with RVR (H)     Cervical spondylosis with radiculopathy     CHF (congestive heart failure) (H)     Chronic back pain     Chronic kidney disease, stage 3 (H)     CVA (cerebral infarction)     Incidental old small infarcts seen on MRI 7/2004    Frequent UTI 7/31/12    had pseudomonas UTI and required caftazidime IV; cystitis cystica on cystoscopy    HTN (hypertension)     Hypertrophic obstructive cardiomyopathy (H)     Hypochloremia     Hypokalemia     Hyponatremia     Hyponatremia     Insomnia     Left wrist fracture 2002    Lumbar spinal stenosis     Macular degeneration     Occlusion and stenosis of carotid artery without mention of cerebral infarction 6/11/2008    Right carotid on US-moderate    Osteoporosis     Pure hypercholesterolemia     Sciatica     Senile cataract        Past Surgical History   Past Surgical History:   Procedure Laterality Date    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    FRACTURE SURGERY Left     wrist    LUMBAR LAMINECTOMY  12/2009    fusion    VAGINAL DELIVERY      x4       Prior to Admission Medications   Prior to Admission Medications   Prescriptions Last Dose Informant Patient Reported? Taking?   acetaminophen (TYLENOL) 500 MG tablet 8/23/2024 at 2 doses  Yes Yes   Sig: Take 500 mg by mouth At Bedtime   atorvastatin (LIPITOR) 20 MG tablet 8/23/2024 at PM  Yes Yes   Sig: Take 20 mg by mouth daily (with lunch)   betamethasone dipropionate  (DIPROSONE) 0.05 % external cream 8/23/2024  Yes Yes   Sig: Apply topically 2 times daily as needed   bumetanide (BUMEX) 1 MG tablet 8/24/2024 at AM  Yes Yes   Sig: Take 2 mg by mouth daily before breakfast   bumetanide (BUMEX) 1 MG tablet 8/23/2024 at PM  Yes Yes   Sig: Take 1 mg by mouth At Bedtime   calcium carbonate 500 mg (elemental) (OSCAL 500) 1250 (500 Ca) MG TABS tablet 8/24/2024 at AM  Yes Yes   Sig: Take 1 tablet by mouth 2 times daily.   cephALEXin (KEFLEX) 250 MG capsule 8/23/2024 at PM  Yes Yes   Sig: Take 250 mg by mouth At Bedtime   cholecalciferol 25 MCG (1000 UT) TABS 8/23/2024 at PM  Yes Yes   Sig: Take 1,000 Units by mouth At Bedtime   diltiazem ER (DILT-XR) 180 MG 24 hr capsule 8/23/2024 at PM  Yes Yes   Sig: Take 180 mg by mouth every evening   diphenhydrAMINE (BENADRYL) 25 MG tablet 8/23/2024 at PM  No Yes   Sig: Take 0.5 tablets (12.5 mg) by mouth every 6 hours as needed for itching or allergies   estradiol (ESTRACE) 0.1 MG/GM vaginal cream More than a month  Yes Yes   Sig: Place 1 g vaginally as needed   famotidine (PEPCID) 20 MG tablet 8/24/2024 at AM  Yes Yes   Sig: Take 20 mg by mouth 2 times daily   fluocinonide (LIDEX) 0.05 % external solution 8/23/2024 at PM  Yes Yes   Sig: Apply topically 2 times daily as needed (itchy scalp).   guaiFENesin (MUCINEX) 600 MG 12 hr tablet More than a month  No Yes   Sig: Take 1 tablet (600 mg) by mouth 2 times daily as needed   metoprolol tartrate (LOPRESSOR) 50 MG tablet 8/24/2024 at AM  Yes Yes   Sig: Take 75 mg by mouth 2 times daily   senna-docusate (SENOKOT-S/PERICOLACE) 8.6-50 MG tablet Past Week  Yes Yes   Sig: Take 1 tablet by mouth At Bedtime   tamsulosin (FLOMAX) 0.4 MG capsule 8/23/2024 at PM  No Yes   Sig: Take 1 capsule (0.4 mg) by mouth At Bedtime   vitamin C (ASCORBIC ACID) 1000 MG TABS 8/24/2024 at AM  Yes Yes   Sig: Take 1,000 mg by mouth 2 times daily.   warfarin ANTICOAGULANT (COUMADIN) 2.5 MG tablet 8/23/2024 at PM  Yes Yes   Sig:  "Take 2.5 mg by mouth See Admin Instructions. 2.5 mg MTWFSat., (3.75mg Th,Sun)   warfarin ANTICOAGULANT (COUMADIN) 2.5 MG tablet   Yes Yes   Sig: Take 3.75 mg by mouth See Admin Instructions. Thursday and Sunday      Facility-Administered Medications: None           Physical Exam   Vital Signs: Temp: 98.4  F (36.9  C)   BP: (!) 172/77 Pulse: 105   Resp: 30 SpO2: 97 % O2 Device: Nasal cannula Oxygen Delivery: 2 LPM  Weight: 116 lbs 12.8 oz    She is seen in emergency room #17  Somewhat frail but also somewhat spry with a keen amusing  wit; easy to smile her daughter is here with her  Lung show Rales bilateral probably chronic  Heart atrial fibrillation somewhat rapid rate 120  Abdomen benign  Extremities puffy mostly nonpitting edema pretibial and feet +1  Neurologic unremarkable she is clearly not demented and actually quite amusing with insight (\"I really hate to be in hospitals\")      Medical Decision Making       60 MINUTES SPENT BY ME on the date of service doing chart review, history, exam, documentation & further activities per the note.          Data   Recent Results (from the past 24 hour(s))   Extra Blue Top Tube    Collection Time: 08/24/24 11:48 AM   Result Value Ref Range    Hold Specimen JIC    Extra Green Top (Lithium Heparin) Tube    Collection Time: 08/24/24 11:48 AM   Result Value Ref Range    Hold Specimen JIC    Extra Purple Top Tube    Collection Time: 08/24/24 11:48 AM   Result Value Ref Range    Hold Specimen     Basic metabolic panel    Collection Time: 08/24/24 11:48 AM   Result Value Ref Range    Sodium 144 135 - 145 mmol/L    Potassium 3.3 (L) 3.4 - 5.3 mmol/L    Chloride 99 98 - 107 mmol/L    Carbon Dioxide (CO2) 30 (H) 22 - 29 mmol/L    Anion Gap 15 7 - 15 mmol/L    Urea Nitrogen 13.0 8.0 - 23.0 mg/dL    Creatinine 0.88 0.51 - 0.95 mg/dL    GFR Estimate 61 >60 mL/min/1.73m2    Calcium 8.6 (L) 8.8 - 10.4 mg/dL    Glucose 164 (H) 70 - 99 mg/dL   INR    Collection Time: 08/24/24 11:48 AM "   Result Value Ref Range    INR 2.71 (H) 0.85 - 1.15   Troponin T, High Sensitivity    Collection Time: 08/24/24 11:48 AM   Result Value Ref Range    Troponin T, High Sensitivity 19 (H) <=14 ng/L   Nt probnp inpatient (BNP)    Collection Time: 08/24/24 11:48 AM   Result Value Ref Range    N terminal Pro BNP Inpatient 1,685 0 - 1,800 pg/mL   CBC with platelets and differential    Collection Time: 08/24/24 11:48 AM   Result Value Ref Range    WBC Count 7.7 4.0 - 11.0 10e3/uL    RBC Count 4.34 3.80 - 5.20 10e6/uL    Hemoglobin 12.5 11.7 - 15.7 g/dL    Hematocrit 37.4 35.0 - 47.0 %    MCV 86 78 - 100 fL    MCH 28.8 26.5 - 33.0 pg    MCHC 33.4 31.5 - 36.5 g/dL    RDW 13.5 10.0 - 15.0 %    Platelet Count 211 150 - 450 10e3/uL    % Neutrophils 74 %    % Lymphocytes 12 %    % Monocytes 11 %    % Eosinophils 2 %    % Basophils 0 %    % Immature Granulocytes 1 %    NRBCs per 100 WBC 0 <1 /100    Absolute Neutrophils 5.7 1.6 - 8.3 10e3/uL    Absolute Lymphocytes 1.0 0.8 - 5.3 10e3/uL    Absolute Monocytes 0.8 0.0 - 1.3 10e3/uL    Absolute Eosinophils 0.1 0.0 - 0.7 10e3/uL    Absolute Basophils 0.0 0.0 - 0.2 10e3/uL    Absolute Immature Granulocytes 0.0 <=0.4 10e3/uL    Absolute NRBCs 0.0 10e3/uL   Symptomatic Influenza A/B, RSV, & SARS-CoV2 PCR (COVID-19) Nasopharyngeal    Collection Time: 08/24/24 12:07 PM    Specimen: Nasopharyngeal; Swab   Result Value Ref Range    Influenza A PCR Negative Negative    Influenza B PCR Negative Negative    RSV PCR Negative Negative    SARS CoV2 PCR Negative Negative   UA with Microscopic reflex to Culture    Collection Time: 08/24/24  1:17 PM    Specimen: Urine, Midstream   Result Value Ref Range    Color Urine Colorless Colorless, Straw, Light Yellow, Yellow    Appearance Urine Clear Clear    Glucose Urine Negative Negative mg/dL    Bilirubin Urine Negative Negative    Ketones Urine Negative Negative mg/dL    Specific Gravity Urine 1.008 1.001 - 1.030    Blood Urine Negative Negative    pH  Urine 7.5 (H) 5.0 - 7.0    Protein Albumin Urine Negative Negative mg/dL    Urobilinogen Urine <2.0 <2.0 mg/dL    Nitrite Urine Negative Negative    Leukocyte Esterase Urine Negative Negative    Bacteria Urine Few (A) None Seen /HPF    RBC Urine <1 <=2 /HPF    WBC Urine 0 <=5 /HPF    Squamous Epithelials Urine <1 <=1 /HPF   Troponin T, High Sensitivity    Collection Time: 08/24/24  2:03 PM   Result Value Ref Range    Troponin T, High Sensitivity 17 (H) <=14 ng/L

## 2024-08-24 NOTE — PHARMACY-ANTICOAGULATION SERVICE
Clinical Pharmacy - Warfarin Dosing Consult     Pharmacy has been consulted to manage this patient s warfarin therapy.  Indication: Atrial Fibrillation  Therapy Goal: INR 2-3  Provider/Team: sae  Warfarin Prior to Admission: Yes  Warfarin PTA Regimen: 3.75 on Thurs and Sat;  2.5mg all other days  Significant drug interactions: daily cephalexin  Recent documented change in oral intake/nutrition: Unknown    INR   Date Value Ref Range Status   08/24/2024 2.71 (H) 0.85 - 1.15 Final   10/15/2022 2.06 (H) 0.85 - 1.15 Final       Recommend warfarin 2.5 mg today.  Pharmacy will monitor Alva Dumont daily and order warfarin doses to achieve specified goal.      Please contact pharmacy as soon as possible if the warfarin needs to be held for a procedure or if the warfarin goals change.    2.

## 2024-08-24 NOTE — PHARMACY-ADMISSION MEDICATION HISTORY
Pharmacy Intern Admission Medication History    Admission medication history is complete. The information provided in this note is only as accurate as the sources available at the time of the update.    Information Source(s): Patient, Family member, and CareEverywhere/SureScripts via in-person daughter in room     Pertinent Information: Warfarin 2.5mg MTWFSat. And 3.75mg Thursday and Sunday. This was changed yesterday due to high INR     Changes made to PTA medication list:  Added: fluocinonide solution   Deleted: presevision,   Changed: warfarin dosing     Allergies reviewed with patient and updates made in EHR: yes    Medication History Completed By: Suyapa De La Rosa 8/24/2024 1:39 PM    PTA Med List   Medication Sig Last Dose    acetaminophen (TYLENOL) 500 MG tablet Take 500 mg by mouth At Bedtime 8/23/2024 at 2 doses    atorvastatin (LIPITOR) 20 MG tablet Take 20 mg by mouth daily (with lunch) 8/23/2024 at PM    betamethasone dipropionate (DIPROSONE) 0.05 % external cream Apply topically 2 times daily as needed 8/23/2024    bumetanide (BUMEX) 1 MG tablet Take 2 mg by mouth daily before breakfast 8/24/2024 at AM    bumetanide (BUMEX) 1 MG tablet Take 1 mg by mouth At Bedtime 8/23/2024 at PM    calcium carbonate 500 mg (elemental) (OSCAL 500) 1250 (500 Ca) MG TABS tablet Take 1 tablet by mouth 2 times daily. 8/24/2024 at AM    cephALEXin (KEFLEX) 250 MG capsule Take 250 mg by mouth At Bedtime 8/23/2024 at PM    cholecalciferol 25 MCG (1000 UT) TABS Take 1,000 Units by mouth At Bedtime 8/23/2024 at PM    diltiazem ER (DILT-XR) 180 MG 24 hr capsule Take 180 mg by mouth every evening 8/23/2024 at PM    diphenhydrAMINE (BENADRYL) 25 MG tablet Take 0.5 tablets (12.5 mg) by mouth every 6 hours as needed for itching or allergies 8/23/2024 at PM    estradiol (ESTRACE) 0.1 MG/GM vaginal cream Place 1 g vaginally as needed More than a month    famotidine (PEPCID) 20 MG tablet Take 20 mg by mouth 2 times daily 8/24/2024 at AM     fluocinonide (LIDEX) 0.05 % external solution Apply topically 2 times daily as needed (itchy scalp). 8/23/2024 at PM    guaiFENesin (MUCINEX) 600 MG 12 hr tablet Take 1 tablet (600 mg) by mouth 2 times daily as needed More than a month    metoprolol tartrate (LOPRESSOR) 50 MG tablet Take 75 mg by mouth 2 times daily 8/24/2024 at AM    senna-docusate (SENOKOT-S/PERICOLACE) 8.6-50 MG tablet Take 1 tablet by mouth At Bedtime Past Week    tamsulosin (FLOMAX) 0.4 MG capsule Take 1 capsule (0.4 mg) by mouth At Bedtime 8/23/2024 at PM    vitamin C (ASCORBIC ACID) 1000 MG TABS Take 1,000 mg by mouth 2 times daily. 8/24/2024 at AM    warfarin ANTICOAGULANT (COUMADIN) 2.5 MG tablet Take 2.5 mg by mouth See Admin Instructions. 2.5 mg MTWFSat., (3.75mg Th,Sun) 8/23/2024 at PM    warfarin ANTICOAGULANT (COUMADIN) 2.5 MG tablet Take 3.75 mg by mouth See Admin Instructions. Thursday and Sunday

## 2024-08-24 NOTE — ED TRIAGE NOTES
Patient presents to the ED with shortness of breath and fatigue for the last few days. Was seen at the clinic and her oxygen sats were 88% and was advised to come to the ER for evaluation.  History of CHF. Denies any excessive weight gain, weighs herself daily. EKG done in triage.

## 2024-08-25 PROBLEM — I50.33 ACUTE ON CHRONIC DIASTOLIC CONGESTIVE HEART FAILURE (H): Status: ACTIVE | Noted: 2022-09-16

## 2024-08-25 LAB
ANION GAP SERPL CALCULATED.3IONS-SCNC: 15 MMOL/L (ref 7–15)
BUN SERPL-MCNC: 11.3 MG/DL (ref 8–23)
CALCIUM SERPL-MCNC: 8.6 MG/DL (ref 8.8–10.4)
CHLORIDE SERPL-SCNC: 101 MMOL/L (ref 98–107)
CREAT SERPL-MCNC: 0.86 MG/DL (ref 0.51–0.95)
EGFRCR SERPLBLD CKD-EPI 2021: 62 ML/MIN/1.73M2
ERYTHROCYTE [DISTWIDTH] IN BLOOD BY AUTOMATED COUNT: 13.5 % (ref 10–15)
GLUCOSE SERPL-MCNC: 125 MG/DL (ref 70–99)
HCO3 SERPL-SCNC: 29 MMOL/L (ref 22–29)
HCT VFR BLD AUTO: 33.2 % (ref 35–47)
HGB BLD-MCNC: 11.1 G/DL (ref 11.7–15.7)
INR PPP: 2.67 (ref 0.85–1.15)
MCH RBC QN AUTO: 28.7 PG (ref 26.5–33)
MCHC RBC AUTO-ENTMCNC: 33.4 G/DL (ref 31.5–36.5)
MCV RBC AUTO: 86 FL (ref 78–100)
PLATELET # BLD AUTO: 178 10E3/UL (ref 150–450)
POTASSIUM SERPL-SCNC: 3.6 MMOL/L (ref 3.4–5.3)
RBC # BLD AUTO: 3.87 10E6/UL (ref 3.8–5.2)
SODIUM SERPL-SCNC: 145 MMOL/L (ref 135–145)
TROPONIN T SERPL HS-MCNC: 18 NG/L
WBC # BLD AUTO: 6.6 10E3/UL (ref 4–11)

## 2024-08-25 PROCEDURE — 250N000013 HC RX MED GY IP 250 OP 250 PS 637: Performed by: HOSPITALIST

## 2024-08-25 PROCEDURE — 80048 BASIC METABOLIC PNL TOTAL CA: CPT | Performed by: INTERNAL MEDICINE

## 2024-08-25 PROCEDURE — 250N000011 HC RX IP 250 OP 636: Performed by: HOSPITALIST

## 2024-08-25 PROCEDURE — 99232 SBSQ HOSP IP/OBS MODERATE 35: CPT | Performed by: HOSPITALIST

## 2024-08-25 PROCEDURE — 85027 COMPLETE CBC AUTOMATED: CPT | Performed by: INTERNAL MEDICINE

## 2024-08-25 PROCEDURE — 85610 PROTHROMBIN TIME: CPT | Performed by: INTERNAL MEDICINE

## 2024-08-25 PROCEDURE — 36415 COLL VENOUS BLD VENIPUNCTURE: CPT | Performed by: INTERNAL MEDICINE

## 2024-08-25 PROCEDURE — 250N000013 HC RX MED GY IP 250 OP 250 PS 637: Performed by: EMERGENCY MEDICINE

## 2024-08-25 PROCEDURE — 120N000004 HC R&B MS OVERFLOW

## 2024-08-25 PROCEDURE — 250N000013 HC RX MED GY IP 250 OP 250 PS 637: Performed by: INTERNAL MEDICINE

## 2024-08-25 PROCEDURE — 84484 ASSAY OF TROPONIN QUANT: CPT | Performed by: INTERNAL MEDICINE

## 2024-08-25 RX ORDER — METOLAZONE 2.5 MG/1
2.5 TABLET ORAL ONCE
Status: COMPLETED | OUTPATIENT
Start: 2024-08-25 | End: 2024-08-25

## 2024-08-25 RX ORDER — BUMETANIDE 0.25 MG/ML
2 INJECTION INTRAMUSCULAR; INTRAVENOUS ONCE
Status: COMPLETED | OUTPATIENT
Start: 2024-08-25 | End: 2024-08-25

## 2024-08-25 RX ORDER — FAMOTIDINE 20 MG/1
20 TABLET, FILM COATED ORAL DAILY
Status: DISCONTINUED | OUTPATIENT
Start: 2024-08-26 | End: 2024-08-26

## 2024-08-25 RX ADMIN — TAMSULOSIN HYDROCHLORIDE 0.4 MG: 0.4 CAPSULE ORAL at 21:41

## 2024-08-25 RX ADMIN — ATORVASTATIN CALCIUM 20 MG: 10 TABLET, FILM COATED ORAL at 12:20

## 2024-08-25 RX ADMIN — METOLAZONE 2.5 MG: 2.5 TABLET ORAL at 16:02

## 2024-08-25 RX ADMIN — FAMOTIDINE 20 MG: 20 TABLET, FILM COATED ORAL at 09:08

## 2024-08-25 RX ADMIN — METOPROLOL TARTRATE 100 MG: 50 TABLET, FILM COATED ORAL at 19:36

## 2024-08-25 RX ADMIN — DILTIAZEM HYDROCHLORIDE 240 MG: 240 CAPSULE, EXTENDED RELEASE ORAL at 19:36

## 2024-08-25 RX ADMIN — Medication 25 MCG: at 21:41

## 2024-08-25 RX ADMIN — ACETAMINOPHEN 650 MG: 325 TABLET ORAL at 21:41

## 2024-08-25 RX ADMIN — WARFARIN SODIUM 3.75 MG: 2.5 TABLET ORAL at 17:42

## 2024-08-25 RX ADMIN — OXYCODONE HYDROCHLORIDE AND ACETAMINOPHEN 1000 MG: 500 TABLET ORAL at 09:09

## 2024-08-25 RX ADMIN — BUMETANIDE 2 MG: 0.25 INJECTION, SOLUTION INTRAMUSCULAR; INTRAVENOUS at 16:42

## 2024-08-25 RX ADMIN — CALCIUM 500 MG: 500 TABLET ORAL at 09:08

## 2024-08-25 RX ADMIN — OXYCODONE HYDROCHLORIDE AND ACETAMINOPHEN 1000 MG: 500 TABLET ORAL at 19:36

## 2024-08-25 RX ADMIN — BUMETANIDE 2 MG: 2 TABLET ORAL at 09:09

## 2024-08-25 RX ADMIN — CALCIUM 500 MG: 500 TABLET ORAL at 17:38

## 2024-08-25 RX ADMIN — METOPROLOL TARTRATE 100 MG: 50 TABLET, FILM COATED ORAL at 09:09

## 2024-08-25 ASSESSMENT — ACTIVITIES OF DAILY LIVING (ADL)
ADLS_ACUITY_SCORE: 26
ADLS_ACUITY_SCORE: 28
ADLS_ACUITY_SCORE: 26
ADLS_ACUITY_SCORE: 33
ADLS_ACUITY_SCORE: 26
ADLS_ACUITY_SCORE: 33

## 2024-08-25 NOTE — PROGRESS NOTES
Cross cover note:    Notified that the patient was having nausea refractory to Zofran.  RN is requesting a one-time dose of Compazine.  One-time dose of Compazine 5 mg was ordered.

## 2024-08-25 NOTE — PLAN OF CARE
Pt Aox4. Denies pain. Had one episode of emesis earlier d/t potassium solution. Pt states did not sit well with Pt. Zofran po was given with little to no effective, MD gr paged for IV compazine which was effective per pt. PIV SL. Denies SOB and CP. Pt up with SB, walker/gaitbelt. VSS on 1L NC. Controlled A fib on tele with HR between 70-80's. Call light within reach. Alarms on for safety.       Problem: Adult Inpatient Plan of Care  Goal: Optimal Comfort and Wellbeing  Outcome: Progressing     Problem: Gas Exchange Impaired  Goal: Optimal Gas Exchange  Intervention: Optimize Oxygenation and Ventilation  Recent Flowsheet Documentation  Taken 8/25/2024 0030 by Alicja Dhaliwal, RN  Head of Bed (HOB) Positioning: HOB at 20-30 degrees  Taken 8/24/2024 2000 by Alicja Dhaliwal, RN  Head of Bed (HOB) Positioning: HOB at 20-30 degrees   Goal Outcome Evaluation:

## 2024-08-25 NOTE — PROGRESS NOTES
"[facility]    Medicine Progress Note - Hospitalist Service    Date of Admission:  8/24/2024    Assessment & Plan   Alva Dumont is a 89 year old female admitted with decompensated HFpEF.  She is clinically stable.  Ongoing supplemental oxygen requirement.  Remains volume overloaded on exam.  Administer metolazone x1 prior to IV bumetanide.      # Acute HFpEF  - IV bumetanide  - metolazone    # Afib  - metoprolol  - warfarin            Diet: Combination Diet Low Saturated Fat Na <2400mg Diet      Short Catheter: Not present  Lines: None     Cardiac Monitoring: ACTIVE order. Indication: Acute decompensated heart failure (48 hours)  Code Status: No CPR- Do NOT Intubate      Clinically Significant Risk Factors        # Hypokalemia: Lowest K = 3.3 mmol/L in last 2 days, will replace as needed           # Hypertension: Noted on problem list  # Chronic heart failure with preserved ejection fraction: heart failure noted on problem list and last echo with EF >50%          # Overweight: Estimated body mass index is 25.04 kg/m  as calculated from the following:    Height as of this encounter: 1.448 m (4' 9\").    Weight as of this encounter: 52.5 kg (115 lb 11.2 oz)., PRESENT ON ADMISSION                  Disposition Plan     Medically Ready for Discharge:              Sam Stallings MD  Hospitalist Service  [facility]  Securely message with [employer] (more info)  Text page via [employer] Paging/Directory   ______________________________________________________________________    Interval History   Denies dyspnea, chest pain, or chest pressure.  Had stomach discomfort which improved after going to the bathroom.  Family reports leg swelling has improved.    Physical Exam   Vital Signs: Temp: 98.4  F (36.9  C) Temp src: Oral BP: 133/61 Pulse: 79   Resp: 22 SpO2: 93 % O2 Device: Nasal cannula Oxygen Delivery: 1.5 LPM  Weight: 115 lbs 11.2 oz    Gen:  sitting in chair in no " extremis  Neuro: alert, conversant  CV:  tachycardia, irregular rhythm  Pulm: no acute resp distress, bibasilar crackles posteriorly  GI:  abdomen NTTP  Ext:  mild lower extremity edema    Medical Decision Making             Data   Reviewed:    Na 145  K 3.6  BUN 11  Cr 0.86    WBC 6.6  Hgb 11  Plts 178    INR 2.7

## 2024-08-26 ENCOUNTER — DOCUMENTATION ONLY (OUTPATIENT)
Dept: OTHER | Facility: CLINIC | Age: 89
End: 2024-08-26
Payer: MEDICARE

## 2024-08-26 LAB
ANION GAP SERPL CALCULATED.3IONS-SCNC: 12 MMOL/L (ref 7–15)
BUN SERPL-MCNC: 19.4 MG/DL (ref 8–23)
CALCIUM SERPL-MCNC: 9.6 MG/DL (ref 8.8–10.4)
CHLORIDE SERPL-SCNC: 96 MMOL/L (ref 98–107)
CREAT SERPL-MCNC: 1.01 MG/DL (ref 0.51–0.95)
EGFRCR SERPLBLD CKD-EPI 2021: 51 ML/MIN/1.73M2
ERYTHROCYTE [DISTWIDTH] IN BLOOD BY AUTOMATED COUNT: 13.3 % (ref 10–15)
GLUCOSE SERPL-MCNC: 120 MG/DL (ref 70–99)
HCO3 SERPL-SCNC: 32 MMOL/L (ref 22–29)
HCT VFR BLD AUTO: 32.4 % (ref 35–47)
HGB BLD-MCNC: 10.9 G/DL (ref 11.7–15.7)
INR PPP: 2.41 (ref 0.85–1.15)
MCH RBC QN AUTO: 28.7 PG (ref 26.5–33)
MCHC RBC AUTO-ENTMCNC: 33.6 G/DL (ref 31.5–36.5)
MCV RBC AUTO: 85 FL (ref 78–100)
PLATELET # BLD AUTO: 190 10E3/UL (ref 150–450)
POTASSIUM SERPL-SCNC: 3.1 MMOL/L (ref 3.4–5.3)
POTASSIUM SERPL-SCNC: 3.7 MMOL/L (ref 3.4–5.3)
RBC # BLD AUTO: 3.8 10E6/UL (ref 3.8–5.2)
SODIUM SERPL-SCNC: 140 MMOL/L (ref 135–145)
WBC # BLD AUTO: 6.3 10E3/UL (ref 4–11)

## 2024-08-26 PROCEDURE — 80048 BASIC METABOLIC PNL TOTAL CA: CPT | Performed by: INTERNAL MEDICINE

## 2024-08-26 PROCEDURE — 250N000013 HC RX MED GY IP 250 OP 250 PS 637: Performed by: HOSPITALIST

## 2024-08-26 PROCEDURE — 36415 COLL VENOUS BLD VENIPUNCTURE: CPT | Performed by: INTERNAL MEDICINE

## 2024-08-26 PROCEDURE — 85027 COMPLETE CBC AUTOMATED: CPT | Performed by: INTERNAL MEDICINE

## 2024-08-26 PROCEDURE — 84132 ASSAY OF SERUM POTASSIUM: CPT | Performed by: HOSPITALIST

## 2024-08-26 PROCEDURE — 99232 SBSQ HOSP IP/OBS MODERATE 35: CPT | Performed by: HOSPITALIST

## 2024-08-26 PROCEDURE — 250N000013 HC RX MED GY IP 250 OP 250 PS 637: Performed by: INTERNAL MEDICINE

## 2024-08-26 PROCEDURE — 85610 PROTHROMBIN TIME: CPT | Performed by: INTERNAL MEDICINE

## 2024-08-26 PROCEDURE — 36415 COLL VENOUS BLD VENIPUNCTURE: CPT | Performed by: HOSPITALIST

## 2024-08-26 PROCEDURE — 250N000013 HC RX MED GY IP 250 OP 250 PS 637: Performed by: EMERGENCY MEDICINE

## 2024-08-26 PROCEDURE — 250N000011 HC RX IP 250 OP 636: Performed by: HOSPITALIST

## 2024-08-26 PROCEDURE — 120N000004 HC R&B MS OVERFLOW

## 2024-08-26 RX ORDER — POTASSIUM CHLORIDE 7.45 MG/ML
10 INJECTION INTRAVENOUS
Status: COMPLETED | OUTPATIENT
Start: 2024-08-26 | End: 2024-08-26

## 2024-08-26 RX ORDER — BUMETANIDE 0.25 MG/ML
2 INJECTION INTRAMUSCULAR; INTRAVENOUS ONCE
Status: DISCONTINUED | OUTPATIENT
Start: 2024-08-26 | End: 2024-08-26

## 2024-08-26 RX ORDER — FAMOTIDINE 20 MG/1
20 TABLET, FILM COATED ORAL EVERY OTHER DAY
Status: DISCONTINUED | OUTPATIENT
Start: 2024-08-27 | End: 2024-08-27 | Stop reason: HOSPADM

## 2024-08-26 RX ORDER — METOLAZONE 2.5 MG/1
2.5 TABLET ORAL ONCE
Status: DISCONTINUED | OUTPATIENT
Start: 2024-08-26 | End: 2024-08-26

## 2024-08-26 RX ORDER — BUMETANIDE 0.25 MG/ML
2 INJECTION INTRAMUSCULAR; INTRAVENOUS ONCE
Status: COMPLETED | OUTPATIENT
Start: 2024-08-26 | End: 2024-08-26

## 2024-08-26 RX ADMIN — CALCIUM 500 MG: 500 TABLET ORAL at 18:47

## 2024-08-26 RX ADMIN — OXYCODONE HYDROCHLORIDE AND ACETAMINOPHEN 1000 MG: 500 TABLET ORAL at 21:10

## 2024-08-26 RX ADMIN — METOPROLOL TARTRATE 100 MG: 50 TABLET, FILM COATED ORAL at 08:31

## 2024-08-26 RX ADMIN — WARFARIN SODIUM 2.5 MG: 2.5 TABLET ORAL at 18:47

## 2024-08-26 RX ADMIN — POTASSIUM CHLORIDE 10 MEQ: 7.46 INJECTION, SOLUTION INTRAVENOUS at 07:21

## 2024-08-26 RX ADMIN — BUMETANIDE 2 MG: 0.25 INJECTION, SOLUTION INTRAMUSCULAR; INTRAVENOUS at 12:26

## 2024-08-26 RX ADMIN — Medication 25 MCG: at 21:09

## 2024-08-26 RX ADMIN — DILTIAZEM HYDROCHLORIDE 240 MG: 240 CAPSULE, EXTENDED RELEASE ORAL at 21:09

## 2024-08-26 RX ADMIN — TAMSULOSIN HYDROCHLORIDE 0.4 MG: 0.4 CAPSULE ORAL at 21:10

## 2024-08-26 RX ADMIN — ACETAMINOPHEN 650 MG: 325 TABLET ORAL at 21:16

## 2024-08-26 RX ADMIN — ATORVASTATIN CALCIUM 20 MG: 10 TABLET, FILM COATED ORAL at 12:26

## 2024-08-26 RX ADMIN — OXYCODONE HYDROCHLORIDE AND ACETAMINOPHEN 1000 MG: 500 TABLET ORAL at 08:31

## 2024-08-26 RX ADMIN — SENNOSIDES AND DOCUSATE SODIUM 1 TABLET: 50; 8.6 TABLET ORAL at 21:09

## 2024-08-26 RX ADMIN — CALCIUM 500 MG: 500 TABLET ORAL at 08:31

## 2024-08-26 RX ADMIN — METOPROLOL TARTRATE 100 MG: 50 TABLET, FILM COATED ORAL at 21:10

## 2024-08-26 RX ADMIN — FAMOTIDINE 20 MG: 20 TABLET, FILM COATED ORAL at 08:31

## 2024-08-26 RX ADMIN — POTASSIUM CHLORIDE 10 MEQ: 7.46 INJECTION, SOLUTION INTRAVENOUS at 06:19

## 2024-08-26 ASSESSMENT — ACTIVITIES OF DAILY LIVING (ADL)
ADLS_ACUITY_SCORE: 28
ADLS_ACUITY_SCORE: 29
ADLS_ACUITY_SCORE: 28
ADLS_ACUITY_SCORE: 29
ADLS_ACUITY_SCORE: 28
ADLS_ACUITY_SCORE: 28
ADLS_ACUITY_SCORE: 29
ADLS_ACUITY_SCORE: 28
ADLS_ACUITY_SCORE: 28
ADLS_ACUITY_SCORE: 29
ADLS_ACUITY_SCORE: 28
ADLS_ACUITY_SCORE: 28
ADLS_ACUITY_SCORE: 29
ADLS_ACUITY_SCORE: 28
ADLS_ACUITY_SCORE: 29
ADLS_ACUITY_SCORE: 29
ADLS_ACUITY_SCORE: 28
ADLS_ACUITY_SCORE: 29

## 2024-08-26 NOTE — PLAN OF CARE
"Denies pain, CP, N/V, SOB although VALERA. Tried to titrate off O2, Spo2 decreased to 88%, remains on 1L overnight. During assessments, noticed pt coughs with sips of water and trouble swallowing pills. Pt states feels like water \"spilling into lungs.\" Possible bedside swallow study needed. Refused senna at HS. PIV SL. Up with SB, walker/gaitbelt. VSS. Controlled A fib on tele, HR in 70-80's. On K protocol. Call light within reach. Alarms on for safety.       Problem: Gas Exchange Impaired  Goal: Optimal Gas Exchange  Outcome: Progressing     Problem: Adult Inpatient Plan of Care  Goal: Optimal Comfort and Wellbeing  Outcome: Progressing   Goal Outcome Evaluation:                        "

## 2024-08-26 NOTE — CONSULTS
Care Management Initial Consult    General Information  Assessment completed with: Patient  Type of CM/SW Visit: Initial Assessment    Primary Care Provider verified and updated as needed: Yes   Readmission within the last 30 days: no previous admission in last 30 days      Reason for Consult: discharge planning  Advance Care Planning: Advance Care Planning Reviewed:  (has a HCD)          Communication Assessment  Patient's communication style: spoken language (English or Bilingual), spoken language (non-English)    Hearing Difficulty or Deaf: no   Wear Glasses or Blind: no      Cognitive  Cognitive/Neuro/Behavioral: WDL                        Living Environment:   People in home:  (daughter)     Current living Arrangements: house      Able to return to prior arrangements: yes       Family/Social Support:  Care provided by: self  Provides care for: no one  Marital Status:              Description of Support System:   supportive and involved        Current Resources:   Patient receiving home care services: No  Community Resources: None  Equipment currently used at home: walker, rolling, shower chair, grab bar, toilet  Supplies currently used at home: None      Employment/Financial:  Financial Concerns: none        Functional Status:  Prior to admission patient needed assistance:   Dependent ADLs:: Ambulation-walker  Dependent IADLs:: Cleaning, Cooking, Laundry, Money Management, Transportation (also has metro mobility)       Mental Health Status:  Mental Health Status: No Current Concerns         Chemical Dependency Status:  Chemical Dependency Status: No Current Concerns             Values/Beliefs:  Spiritual, Cultural Beliefs, Scientologist Practices, Values that affect care: no               Additional Information:  CM reviewed chart. CM met with patient- introduced self and role of CM. Assessed. Assessed. Verified home address, phone # and emergency contacts. Patient lives with daughter in a private residence.  Prior to admission patient needed assistance: Dependent ADLs:: Ambulation-walker. Dependent IADLs:: Cleaning, Cooking, Laundry, Money Management, Transportation (also has metro mobility). Patient receiving home care services: No. Community Resources: None. Equipment currently used at home: walker, rolling, shower chair, grab bar, toilet. Supplies currently used at home: None. Mental Health Status: No Current Concerns/denied. Chemical Dependency Status: No Current Concerns/denied. Denied CM needs. Anticipate discharge home with family. Family will provide transportation home at discharge. No further care management intervention anticipated at this time.  Please re-consult if further needs arise.  Care management signing off.          CM consult for Elevated Risk Score          Mylene Carrasco RN  Care Coordinator

## 2024-08-26 NOTE — PROGRESS NOTES
"[facility]    Medicine Progress Note - Hospitalist Service    Date of Admission:  8/24/2024    Assessment & Plan   Alva Dumont is a 89 year old female admitted with decompensated HFpEF.  She is clinically stable.  Hypoxia resolved.  Remains volume overloaded on exam.  Repeat IV bumetanide.  Perform ambulatory oximetry.  Reassess volume status 8/27/24.      # Decompensated HFpEF  - IV bumetanide  - ambulatory oximetry    # Afib  - metoprolol  - warfarin            Diet: Combination Diet Low Saturated Fat Na <2400mg Diet    Short Catheter: Not present  Lines: None     Cardiac Monitoring: ACTIVE order. Indication: Acute decompensated heart failure (48 hours)  Code Status: No CPR- Do NOT Intubate      Clinically Significant Risk Factors        # Hypokalemia: Lowest K = 3.1 mmol/L in last 2 days, will replace as needed           # Hypertension: Noted on problem list  # Acute heart failure with preserved ejection fraction: heart failure noted on problem list, last echo with EF >50%, and receiving IV diuretics          # Overweight: Estimated body mass index is 25.15 kg/m  as calculated from the following:    Height as of this encounter: 1.448 m (4' 9\").    Weight as of this encounter: 52.7 kg (116 lb 3.2 oz)., PRESENT ON ADMISSION                  Disposition Plan     Medically Ready for Discharge: Anticipated Tomorrow             Sam Stallings MD  Hospitalist Service  [facility]  Securely message with [employer] (more info)  Text page via [employer] Paging/Directory   ______________________________________________________________________    Interval History   Feels good.  Denies dyspnea, chest pain, or chest pressure.  Reports nausea and sensation that her head is spinning.    Per nursing, weaned to room air.    Physical Exam   Vital Signs: Temp: 98.5  F (36.9  C) Temp src: Oral BP: (!) 165/71 Pulse: 97   Resp: 22 SpO2: 94 % O2 Device: Nasal cannula Oxygen Delivery: 1 " LPM  Weight: 116 lbs 3.2 oz    Gen:  sitting in chair in no extremis  Neuro: alert, conversant  CV:  irregular rhythm, normal rate  Pulm: no acute resp distress, bibasilar crackles posteriorly  Ext:  mild lower extremity edema    Medical Decision Making             Data   Reviewed:    Na 140  K 3.1  BUN 19  Cr 1  CO2 32    WBC 6.3  Hgb 11  Plts 190    INR 2.4

## 2024-08-27 VITALS
HEART RATE: 83 BPM | TEMPERATURE: 98.2 F | OXYGEN SATURATION: 90 % | RESPIRATION RATE: 20 BRPM | HEIGHT: 57 IN | WEIGHT: 114.7 LBS | BODY MASS INDEX: 24.75 KG/M2 | SYSTOLIC BLOOD PRESSURE: 131 MMHG | DIASTOLIC BLOOD PRESSURE: 63 MMHG

## 2024-08-27 LAB
ANION GAP SERPL CALCULATED.3IONS-SCNC: 13 MMOL/L (ref 7–15)
ANION GAP SERPL CALCULATED.3IONS-SCNC: 16 MMOL/L (ref 7–15)
BUN SERPL-MCNC: 28.5 MG/DL (ref 8–23)
BUN SERPL-MCNC: 31 MG/DL (ref 8–23)
CALCIUM SERPL-MCNC: 9.3 MG/DL (ref 8.8–10.4)
CALCIUM SERPL-MCNC: 9.8 MG/DL (ref 8.8–10.4)
CHLORIDE SERPL-SCNC: 91 MMOL/L (ref 98–107)
CHLORIDE SERPL-SCNC: 92 MMOL/L (ref 98–107)
CREAT SERPL-MCNC: 1.12 MG/DL (ref 0.51–0.95)
CREAT SERPL-MCNC: 1.16 MG/DL (ref 0.51–0.95)
EGFRCR SERPLBLD CKD-EPI 2021: 43 ML/MIN/1.73M2
EGFRCR SERPLBLD CKD-EPI 2021: 45 ML/MIN/1.73M2
GLUCOSE SERPL-MCNC: 127 MG/DL (ref 70–99)
GLUCOSE SERPL-MCNC: 169 MG/DL (ref 70–99)
HCO3 SERPL-SCNC: 30 MMOL/L (ref 22–29)
HCO3 SERPL-SCNC: 33 MMOL/L (ref 22–29)
INR PPP: 2.09 (ref 0.85–1.15)
POTASSIUM SERPL-SCNC: 3.2 MMOL/L (ref 3.4–5.3)
POTASSIUM SERPL-SCNC: 4.3 MMOL/L (ref 3.4–5.3)
SODIUM SERPL-SCNC: 134 MMOL/L (ref 135–145)
SODIUM SERPL-SCNC: 141 MMOL/L (ref 135–145)

## 2024-08-27 PROCEDURE — 99238 HOSP IP/OBS DSCHRG MGMT 30/<: CPT | Performed by: HOSPITALIST

## 2024-08-27 PROCEDURE — 250N000013 HC RX MED GY IP 250 OP 250 PS 637: Performed by: INTERNAL MEDICINE

## 2024-08-27 PROCEDURE — 36415 COLL VENOUS BLD VENIPUNCTURE: CPT | Performed by: HOSPITALIST

## 2024-08-27 PROCEDURE — 250N000013 HC RX MED GY IP 250 OP 250 PS 637: Performed by: EMERGENCY MEDICINE

## 2024-08-27 PROCEDURE — 250N000013 HC RX MED GY IP 250 OP 250 PS 637: Performed by: HOSPITALIST

## 2024-08-27 PROCEDURE — 85610 PROTHROMBIN TIME: CPT | Performed by: INTERNAL MEDICINE

## 2024-08-27 PROCEDURE — 80048 BASIC METABOLIC PNL TOTAL CA: CPT | Performed by: HOSPITALIST

## 2024-08-27 RX ORDER — WARFARIN SODIUM 2.5 MG/1
2.5 TABLET ORAL
Status: DISCONTINUED | OUTPATIENT
Start: 2024-08-27 | End: 2024-08-27 | Stop reason: HOSPADM

## 2024-08-27 RX ORDER — METOPROLOL TARTRATE 50 MG
100 TABLET ORAL 2 TIMES DAILY
Qty: 30 TABLET | Refills: 0 | Status: SHIPPED | OUTPATIENT
Start: 2024-08-27

## 2024-08-27 RX ORDER — ONDANSETRON 4 MG/1
4 TABLET, ORALLY DISINTEGRATING ORAL EVERY 8 HOURS PRN
Qty: 10 TABLET | Refills: 0 | Status: SHIPPED | OUTPATIENT
Start: 2024-08-27

## 2024-08-27 RX ORDER — BUMETANIDE 2 MG/1
2 TABLET ORAL 2 TIMES DAILY
Qty: 30 TABLET | Refills: 0 | Status: SHIPPED | OUTPATIENT
Start: 2024-08-27

## 2024-08-27 RX ORDER — POTASSIUM CHLORIDE 1500 MG/1
40 TABLET, EXTENDED RELEASE ORAL EVERY 4 HOURS
Status: COMPLETED | OUTPATIENT
Start: 2024-08-27 | End: 2024-08-27

## 2024-08-27 RX ORDER — FAMOTIDINE 10 MG
10 TABLET ORAL DAILY
Qty: 30 TABLET | Refills: 0 | Status: SHIPPED | OUTPATIENT
Start: 2024-08-27

## 2024-08-27 RX ADMIN — POTASSIUM CHLORIDE 40 MEQ: 1500 TABLET, EXTENDED RELEASE ORAL at 15:16

## 2024-08-27 RX ADMIN — METOPROLOL TARTRATE 100 MG: 50 TABLET, FILM COATED ORAL at 09:25

## 2024-08-27 RX ADMIN — POTASSIUM CHLORIDE 40 MEQ: 1500 TABLET, EXTENDED RELEASE ORAL at 11:05

## 2024-08-27 RX ADMIN — OXYCODONE HYDROCHLORIDE AND ACETAMINOPHEN 1000 MG: 500 TABLET ORAL at 09:24

## 2024-08-27 RX ADMIN — CALCIUM 500 MG: 500 TABLET ORAL at 09:24

## 2024-08-27 RX ADMIN — FAMOTIDINE 20 MG: 20 TABLET, FILM COATED ORAL at 09:24

## 2024-08-27 RX ADMIN — ATORVASTATIN CALCIUM 20 MG: 10 TABLET, FILM COATED ORAL at 11:04

## 2024-08-27 ASSESSMENT — ACTIVITIES OF DAILY LIVING (ADL)
ADLS_ACUITY_SCORE: 26
ADLS_ACUITY_SCORE: 28
ADLS_ACUITY_SCORE: 26
ADLS_ACUITY_SCORE: 26
ADLS_ACUITY_SCORE: 28
ADLS_ACUITY_SCORE: 28
ADLS_ACUITY_SCORE: 26
ADLS_ACUITY_SCORE: 28
ADLS_ACUITY_SCORE: 26
ADLS_ACUITY_SCORE: 28

## 2024-08-27 NOTE — PLAN OF CARE
Problem: Adult Inpatient Plan of Care  Goal: Optimal Comfort and Wellbeing  Outcome: Progressing     Problem: Gas Exchange Impaired  Goal: Optimal Gas Exchange  Outcome: Progressing   Goal Outcome Evaluation:       Pt denies chest pain during shift. Tele Afib. Up SBA with gb/fww.

## 2024-08-27 NOTE — PLAN OF CARE
Goal Outcome Evaluation:      Plan of Care Reviewed With: patient, child  Patient is alert and oriented and able to communicate her needs to staff She denies any c/o pain. Patient ambulated to the bathroom with the use of a rolling walker and the assistance of 1 staff. Patient has been voiding large amounts of urine.Appetite good on a regular diet. Plans are for discharge home with her daughter and son in law. Patient will require home oxygen. Awaiting delivery of oxygen from [facility].

## 2024-08-27 NOTE — PROGRESS NOTES
Patient discharged with dtr to transport to home. VSS on RA. PIV access removed prior to discharge. Belongings remain with pt at discharge. AVS reviewed with pt, questions answered, education complete. HF packet provided to pt .

## 2024-08-27 NOTE — PROGRESS NOTES
Patient has been assessed for Home Oxygen needs.     Pulse oximetry (SpO2) and Oxygen flow readings:    SpO2 = 86% on room air at rest while awake.    SpO2 improved to 91% on 1 liters/minute at rest.    SpO2 = 88% on room air during activity/with exercise.    *SpO2 improved to 92% on 1 liters/minute during activity/with exercise.

## 2024-08-27 NOTE — DISCHARGE SUMMARY
[facility]  Hospitalist Discharge Summary      Date of Admission:  8/24/2024  Date of Discharge:  8/27/2024  Discharging Provider: Sam Stallings MD  Discharge Service: Hospitalist Service    Discharge Diagnoses   Decompensated HFpEF  Rapid afib    Clinically Significant Risk Factors          Follow-ups Needed After Discharge   Follow-up Appointments     Follow-up and recommended labs and tests       Follow up with primary care provider, Martin Stauffer, within 7 days for   hospital follow- up.  The following labs/tests are recommended:  - check BMP by 9/3/24 to assess renal function and electrolytes (home   bumetanide increased to 2mg twice daily)  - ensure cardiology followup related to heart failure            Unresulted Labs Ordered in the Past 30 Days of this Admission       No orders found from 7/25/2024 to 8/25/2024.            Discharge Disposition   Discharged to home  Condition at discharge: Stable    Hospital Course   The patient was admitted with decompensated HFpEF.  She has associated atrial fibrillation.  She was treated with IV diuresis and an increase in metoprolol dose.  She continued to require supplemental oxygen by discharge even though her clinical assessment of volume status suggested euvolemia. She was therefore discharged on supplemental oxygen.    Consultations This Hospital Stay   PHARMACY TO DOSE WARFARIN  CARE MANAGEMENT / SOCIAL WORK IP CONSULT    Code Status   No CPR- Do NOT Intubate    Time Spent on this Encounter   I, Sam Stallings MD, personally saw the patient today and spent less than or equal to 30 minutes discharging this patient.       Sam Stallings MD  [facility]  [address] [facility] [address]  Phone: [phone]  Fax: [phone]  ______________________________________________________________________    Physical Exam   Vital Signs: Temp: 98.2  F (36.8  C) Temp src: Oral BP: 131/63 Pulse: 83    Resp: 20 SpO2: 90 % O2 Device: None (Room air) Oxygen Delivery: 1/2 LPM  Weight: 114 lbs 11.2 oz    See progress note       Primary Care Physician   Martin Stauffer    Discharge Orders      Follow-Up with Cardiology [facility] Heart Failure Discharge      Reason for your hospital stay    You were admitted to the hospital with decompensated heart failure.  You received medication to remove fluid from your body.  You continued to require supplemental oxygen by discharge and were sent home with supplemental oxygen.     Follow-up and recommended labs and tests     Follow up with primary care provider, Martin Stauffer, within 7 days for hospital follow- up.  The following labs/tests are recommended:  - check BMP by 9/3/24 to assess renal function and electrolytes (home bumetanide increased to 2mg twice daily)  - ensure cardiology followup related to heart failure     Activity    Your activity upon discharge: activity as tolerated     Discharge Instructions    Restart bumetanide on 8/28/24     Oxygen Adult/Peds    Oxygen Documentation  I certify that this patient, Alva Dumont has been under my care (or a nurse practitioner or physican's assistant working with me). This is the face-to-face encounter for oxygen medical necessity.      At the time of this encounter, I have reviewed the qualifying testing and have determined that supplemental oxygen is reasonable and necessary and is expected to improve the patient's condition in a home setting.         Patient has continued oxygen desaturation due to Chronic Heart Failure I50.    If portability is ordered, is the patient mobile within the home? yes    Was this visit performed as a telehealth visit: No     Diet    Follow this diet upon discharge: low salt diet       Significant Results and Procedures   Most Recent 3 CBC's:  Recent Labs   Lab Test 08/26/24  0510 08/25/24  0854 08/24/24  1148   WBC 6.3 6.6 7.7   HGB 10.9* 11.1* 12.5   MCV 85 86 86    178 211     Most Recent 3  BMP's:  Recent Labs   Lab Test 24  1321 24  0838 24  1110 24  0510   * 141  --  140   POTASSIUM 4.3 3.2* 3.7 3.1*   CHLORIDE 91* 92*  --  96*   CO2 30* 33*  --  32*   BUN 31.0* 28.5*  --  19.4   CR 1.12* 1.16*  --  1.01*   ANIONGAP 13 16*  --  12   YADY 9.8 9.3  --  9.6   * 127*  --  120*   ,   Results for orders placed or performed during the hospital encounter of 24   XR Chest 2 Views    Narrative    EXAM: XR CHEST 2 VIEWS  LOCATION: [facility]  DATE: 2024    INDICATION: Shortness of breath  COMPARISON: Chest radiograph 2022      Impression    IMPRESSION: Stable enlargement of the cardiac silhouette. Persistent pulmonary vascular congestion with likely mild interstitial edema. Stable small bilateral pleural effusions, right larger than left. No pneumothorax. No acute bony abnormality.   Echocardiogram Complete     Value    LVEF  55-60%    Narrative    [id number]  [id number]  [id number]  [id number]^GORDY^LIZ^[place] [address]     Name: CATHY RICARDO  MRN: [id number]  : 1929  Study Date: 2024 02:54 PM  Age: 89 yrs  Gender: Female  Patient Location: [facility]  Reason For Study: Atrial Fibrillation, Hypertrophy  Ordering Physician: LIZ KAMINSKI  Referring Physician: LIZ KAMINSKI  Performed By:      BSA: 1.4 m2  Height: 57 in  Weight: 116 lb  HR: 104  ______________________________________________________________________________  Procedure  Complete Echo Adult.  ______________________________________________________________________________  Interpretation Summary     1. The left ventricle is normal in size. Left ventricular function is  normal.The ejection fraction is 55-60%. There is borderline concentric left  ventricular hypertrophy.  2. The right ventricle is not well visualized. Normal right ventricle size and  systolic function.  3. The left atrium is severely dilated. The  right atrium is moderately  dilated.  4. There is moderate trileaflet aortic sclerosis. There is mild (1+) aortic  regurgitation. No hemodynamically significant valvular aortic stenosis.  5. There is mild (1+) mitral regurgitation.  6. RA pressure of 8 mmHg.  There is no comparison study available.  ______________________________________________________________________________  Left Ventricle  The left ventricle is normal in size. Left ventricular function is normal.The  ejection fraction is 55-60%. There is borderline concentric left ventricular  hypertrophy. Diastolic function not assessed due to arrhythmia. There is  borderline global hypokinesia of the left ventricle.     Right Ventricle  Normal right ventricle size and systolic function. The right ventricle is not  well visualized.     Atria  The left atrium is severely dilated. The right atrium is moderately dilated.     Mitral Valve  There is moderate mitral annular calcification. The mitral valve leaflets are  mildly thickened. There is mild (1+) mitral regurgitation. There is no mitral  valve stenosis.     Tricuspid Valve  Tricuspid valve leaflets appear normal. There is no evidence of tricuspid  stenosis or clinically significant tricuspid regurgitation. There is mild (1+)  tricuspid regurgitation. The right ventricular systolic pressure is  approximated at 30.5 mmHg plus the right atrial pressure. IVC diameter and  respiratory changes fall into an intermediate range suggesting an RA pressure  of 8 mmHg.     Aortic Valve  There is moderate trileaflet aortic sclerosis. There is mild (1+) aortic  regurgitation. No hemodynamically significant valvular aortic stenosis.     Pulmonic Valve  The pulmonic valve is not well visualized. There is trace to mild pulmonic  valvular regurgitation.     Vessels  The aorta root is normal. IVC diameter and respiratory changes fall into an  intermediate range suggesting an RA pressure of 8 mmHg.     Pericardium  There is no  pericardial effusion.     ______________________________________________________________________________  MMode/2D Measurements & Calculations  IVSd: 0.91 cm  LVIDd: 3.4 cm  LVIDs: 2.5 cm  LVPWd: 0.94 cm  FS: 26.5 %     LV mass(C)d: 89.2 grams  LV mass(C)dI: 62.5 grams/m2  Ao root diam: 3.4 cm  LA dimension: 4.0 cm  LA/Ao: 1.2  LVOT diam: 1.9 cm  LVOT area: 2.7 cm2  Ao root diam index Ht(cm/m): 2.3  Ao root diam index BSA (cm/m2): 2.4  EF Biplane: 47.9 %  LA Volume (BP): 62.3 ml  LA Volume Indexed (AL/bp): 45.5 ml/m2  RV Base: 3.1 cm  RWT: 0.55     TAPSE: 1.2 cm     Time Measurements  MM HR: 113.0 BPM     Doppler Measurements & Calculations  MV E max hector: 127.0 cm/sec  MV max P.7 mmHg  MV mean P.0 mmHg  MV V2 VTI: 23.1 cm  MVA(VTI): 1.7 cm2  MV dec slope: 794.0 cm/sec2  MV dec time: 0.16 sec  Ao V2 max: 115.0 cm/sec  Ao max P.0 mmHg  Ao V2 mean: 82.3 cm/sec  Ao mean PG: 3.0 mmHg  Ao V2 VTI: 23.0 cm  GERRI(I,D): 1.7 cm2  GERRI(V,D): 1.8 cm2  AI P1/2t: 347.8 msec  LV V1 max P.3 mmHg  LV V1 max: 76.3 cm/sec  LV V1 VTI: 14.0 cm  SV(LVOT): 38.2 ml  SI(LVOT): 26.7 ml/m2  PA acc time: 0.09 sec  PI end-d hector: 191.0 cm/sec  TR max hector: 276.0 cm/sec  TR max P.5 mmHg     AV Hector Ratio (DI): 0.66  GERRI Index (cm2/m2): 1.2  E/E' av.9  Lateral E/e': 13.9  Medial E/e': 16.0  RV S Hector: 8.3 cm/sec     ______________________________________________________________________________  Report approved by: Caden Gavin 2024 04:14 PM             Discharge Medications   Current Discharge Medication List        START taking these medications    Details   ondansetron (ZOFRAN ODT) 4 MG ODT tab Take 1 tablet (4 mg) by mouth every 8 hours as needed for nausea.  Qty: 10 tablet, Refills: 0    Associated Diagnoses: Nausea           CONTINUE these medications which have CHANGED    Details   bumetanide (BUMEX) 2 MG tablet Take 1 tablet (2 mg) by mouth 2 times daily.  Qty: 30 tablet, Refills: 0    Associated  Diagnoses: Acute on chronic diastolic congestive heart failure (H)      famotidine (PEPCID) 10 MG tablet Take 1 tablet (10 mg) by mouth daily.  Qty: 30 tablet, Refills: 0    Associated Diagnoses: Gastroesophageal reflux disease without esophagitis      metoprolol tartrate (LOPRESSOR) 50 MG tablet Take 2 tablets (100 mg) by mouth 2 times daily.  Qty: 30 tablet, Refills: 0    Associated Diagnoses: Atrial fibrillation with RVR (H)           CONTINUE these medications which have NOT CHANGED    Details   acetaminophen (TYLENOL) 500 MG tablet Take 500 mg by mouth At Bedtime      atorvastatin (LIPITOR) 20 MG tablet Take 20 mg by mouth daily (with lunch)      betamethasone dipropionate (DIPROSONE) 0.05 % external cream Apply topically 2 times daily as needed      calcium carbonate 500 mg (elemental) (OSCAL 500) 1250 (500 Ca) MG TABS tablet Take 1 tablet by mouth 2 times daily.      cephALEXin (KEFLEX) 250 MG capsule Take 250 mg by mouth At Bedtime      cholecalciferol 25 MCG (1000 UT) TABS Take 1,000 Units by mouth At Bedtime      diltiazem ER (DILT-XR) 180 MG 24 hr capsule Take 180 mg by mouth every evening      diphenhydrAMINE (BENADRYL) 25 MG tablet Take 0.5 tablets (12.5 mg) by mouth every 6 hours as needed for itching or allergies    Associated Diagnoses: Adverse effect of drug, sequela      estradiol (ESTRACE) 0.1 MG/GM vaginal cream Place 1 g vaginally as needed      fluocinonide (LIDEX) 0.05 % external solution Apply topically 2 times daily as needed (itchy scalp).      guaiFENesin (MUCINEX) 600 MG 12 hr tablet Take 1 tablet (600 mg) by mouth 2 times daily as needed    Associated Diagnoses: Infection due to 2019 novel coronavirus      senna-docusate (SENOKOT-S/PERICOLACE) 8.6-50 MG tablet Take 1 tablet by mouth At Bedtime      tamsulosin (FLOMAX) 0.4 MG capsule Take 1 capsule (0.4 mg) by mouth At Bedtime  Qty: 30 capsule, Refills: 0    Associated Diagnoses: Urinary retention      vitamin C (ASCORBIC ACID) 1000 MG  "TABS Take 1,000 mg by mouth 2 times daily.      !! warfarin ANTICOAGULANT (COUMADIN) 2.5 MG tablet Take 2.5 mg by mouth See Admin Instructions. 2.5 mg MTWFSat., (3.75mg Th,Sun)      !! warfarin ANTICOAGULANT (COUMADIN) 2.5 MG tablet Take 3.75 mg by mouth See Admin Instructions. Thursday and Sunday       !! - Potential duplicate medications found. Please discuss with provider.        Allergies   Allergies   Allergen Reactions    Cefprozil Shortness Of Breath    Oxycodone-Acetaminophen Itching    Penicillins Nausea and Rash     Has had different cephalosporins in past , tolerates Augmentin ok if benadryl is given together (last given 12/2018)  Tolerated Zosyn 6/2019.   Has had different cephalosporins in past      Sulfa Antibiotics Hives and Other (See Comments)     Other reaction(s): Edema, Other (See Comments)  Swelling in hands and feet    Swelling in hands and feet  Other Reaction(s): Edema  Swelling in hands and feet      Ciprofloxacin Hives    Clonidine Other (See Comments)     Extreme Fatigue  Extreme Fatigue      Codeine Other (See Comments)     \"felt wired\"    \"felt wired\"      Levofloxacin Other (See Comments)     agitation      Azithromycin Nausea and Diarrhea    Cefaclor     Hydrochlorothiazide Nausea    Nitrofurantoin     Oxycodone Itching    Spironolactone Other (See Comments)     Patient can't remember what happens    Zestril [Lisinopril] Other (See Comments)     Patient can not remember what happens    Amoxicillin Hives and Rash     Has had different cephalosporins in past    Aspirin Unknown and Other (See Comments)     Other reaction(s): Other (See Comments)  Contraindication with Coumadin Therapy    Contraindication with Coumadin Therapy  Contraindication with Coumadin Therapy       "

## 2024-08-27 NOTE — PROGRESS NOTES
[facility]    Medicine Progress Note - Hospitalist Service    Date of Admission:  8/24/2024    Assessment & Plan   Alva Dumont is a 89 year old female admitted with decompensated HFpEF.  She is clinically stable.  Modest improvement in volume status based on exam.  Bicarb and BUN elevation, in addition to rising serum creatinine, support improvement in intravascular volume status.  Hold diuretics for today.  Reassess BMP later today.  If renal function stable to improved, plan for discharge.  Perform home oxygen assessment.      # Decompensated HFpEF  - hold diuretics today  - plan to change home bumetanide to 2mg PO bid  - home oxygen assessment    # Afib  - metoprolol  - warfarin      Addendum:  She and her family confirm she has a history of chronic dysphagia and aspiration.  They are in favor of having her eat what she wants.  She reports having intermittent nausea.  Ondansetron prescribed.  BMP shows stable serum creatinine.  Discharge today.          Diet: Combination Diet Low Saturated Fat Na <2400mg Diet      Short Catheter: Not present  Lines: None     Cardiac Monitoring: None  Code Status: No CPR- Do NOT Intubate      Clinically Significant Risk Factors        # Hypokalemia: Lowest K = 3.1 mmol/L in last 2 days, will replace as needed           # Hypertension: Noted on problem list  # Acute heart failure with preserved ejection fraction: heart failure noted on problem list, last echo with EF >50%, and receiving IV diuretics              # Financial/Environmental Concerns: none               Disposition Plan     Medically Ready for Discharge: Anticipated Today             Sam Stallings MD  Hospitalist Service  [facility]  Securely message with [employer] (more info)  Text page via [employer] Paging/Directory   ______________________________________________________________________    Interval History   Denies dyspnea, chest pain, or chest pressure.    Physical  Exam   Vital Signs: Temp: 98.3  F (36.8  C) Temp src: Oral BP: (!) 141/67 Pulse: 83   Resp: 16 SpO2: 90 % O2 Device: None (Room air) Oxygen Delivery: 1/2 LPM  Weight: 114 lbs 11.2 oz    Gen:  sitting in bed in no extremis  Neuro: alert, conversant  CV:  irregular rhythm, HR 90s to palpation  Pulm:  no acute resp distress, crackles at the right base  Ext:  modest interval improvement in lower extremity edema    Medical Decision Making             Data   Reviewed:    Na 141  K 3.2  BUN 29  Cr 1.2    INR 2.1

## 2024-08-29 ENCOUNTER — PATIENT OUTREACH (OUTPATIENT)
Dept: CARE COORDINATION | Facility: CLINIC | Age: 89
End: 2024-08-29
Payer: MEDICARE

## 2024-08-29 NOTE — PROGRESS NOTES
Clinic Care Coordination Contact  Transitions of Care Outreach  Chief Complaint   Patient presents with    Clinic Care Coordination - Post Hospital       Most Recent Admission Date: 8/24/2024   Most Recent Admission Diagnosis: Hypokalemia - E87.6  Hypoxia - R09.02     Most Recent Discharge Date: 8/27/2024   Most Recent Discharge Diagnosis: Hypoxia - R09.02  Hypokalemia - E87.6  Atrial fibrillation with RVR (H) - I48.91  Gastroesophageal reflux disease without esophagitis - K21.9  Acute on chronic diastolic congestive heart failure (H) - I50.33  Nausea - R11.0     Transitions of Care Assessment    Discharge Assessment  How are you doing now that you are home?: Spoke with patient's daughter who shares that things are going well. Patient is wearing oxygen and mediations are set up. She plans to call today and schedule follow up appt's, Declines assistance with this. Thanks writer for the call  How are your symptoms? (Red Flag symptoms escalate to triage hotline per guidelines): Improved  Do you know how to contact your clinic care team if you have future questions or changes to your health status? : Yes  Does the patient have their discharge instructions? : Yes  Does the patient have questions regarding their discharge instructions? : No  Were you started on any new medications or were there changes to any of your previous medications? : Yes  Does the patient have all of their medications?: Yes  Do you have questions regarding any of your medications? : No  Do you have all of your needed medical supplies or equipment (DME)?  (i.e. oxygen tank, CPAP, cane, etc.): Yes    Post-op ([facility] Only)  If the patient had a surgery or procedure, do they have any questions for a nurse?: No             Follow up Plan     Discharge Follow-Up  Discharge follow up appointment scheduled in alignment with recommended follow up timeframe or Transitions of Risk Category? (Low = within 30 days; Moderate= within 14 days; High= within 7  days): No  Patient's follow up appointment not scheduled: Patient declined scheduling support. Education on the importance of transitions of care follow up. Provided scheduling phone number.    No future appointments.    Outpatient Plan as outlined on AVS reviewed with patient.    For any urgent concerns, please contact our 24 hour nurse triage line: [phone] ([phone])       GIN Manrique